# Patient Record
Sex: MALE | Race: WHITE | NOT HISPANIC OR LATINO | Employment: FULL TIME | URBAN - METROPOLITAN AREA
[De-identification: names, ages, dates, MRNs, and addresses within clinical notes are randomized per-mention and may not be internally consistent; named-entity substitution may affect disease eponyms.]

---

## 2018-11-21 ENCOUNTER — OFFICE VISIT (OUTPATIENT)
Dept: URGENT CARE | Facility: CLINIC | Age: 48
End: 2018-11-21

## 2018-11-21 ENCOUNTER — NON-PROVIDER VISIT (OUTPATIENT)
Dept: URGENT CARE | Facility: CLINIC | Age: 48
End: 2018-11-21

## 2018-11-21 DIAGNOSIS — Z01.89 RESPIRATORY CLEARANCE EXAMINATION, ENCOUNTER FOR: ICD-10-CM

## 2018-11-21 PROCEDURE — 94375 RESPIRATORY FLOW VOLUME LOOP: CPT | Performed by: PHYSICIAN ASSISTANT

## 2018-11-21 PROCEDURE — 99999 PR NO CHARGE: CPT | Performed by: PHYSICIAN ASSISTANT

## 2018-11-21 NOTE — PROGRESS NOTES
Jefferson Rooney is a 48 y.o. male here for a non-provider visit for mask fit respiratory clearance    If abnormal was an in office provider notified today (if so, indicate provider)? Yes  Routed to PCP? No

## 2019-08-07 ENCOUNTER — HOSPITAL ENCOUNTER (EMERGENCY)
Facility: MEDICAL CENTER | Age: 49
End: 2019-08-07
Attending: EMERGENCY MEDICINE
Payer: COMMERCIAL

## 2019-08-07 ENCOUNTER — APPOINTMENT (OUTPATIENT)
Dept: RADIOLOGY | Facility: MEDICAL CENTER | Age: 49
End: 2019-08-07
Attending: EMERGENCY MEDICINE
Payer: COMMERCIAL

## 2019-08-07 VITALS
TEMPERATURE: 96.8 F | HEART RATE: 72 BPM | SYSTOLIC BLOOD PRESSURE: 129 MMHG | DIASTOLIC BLOOD PRESSURE: 80 MMHG | HEIGHT: 71 IN | WEIGHT: 210.1 LBS | OXYGEN SATURATION: 97 % | BODY MASS INDEX: 29.41 KG/M2 | RESPIRATION RATE: 18 BRPM

## 2019-08-07 DIAGNOSIS — T14.8XXA SOFT TISSUE AVULSION: ICD-10-CM

## 2019-08-07 DIAGNOSIS — S68.119A TRAUMATIC AMPUTATION OF FINGERTIP, INITIAL ENCOUNTER: ICD-10-CM

## 2019-08-07 DIAGNOSIS — S62.639A CLOSED FRACTURE OF TUFT OF DISTAL PHALANX OF FINGER: ICD-10-CM

## 2019-08-07 PROCEDURE — 700111 HCHG RX REV CODE 636 W/ 250 OVERRIDE (IP): Performed by: EMERGENCY MEDICINE

## 2019-08-07 PROCEDURE — 90471 IMMUNIZATION ADMIN: CPT

## 2019-08-07 PROCEDURE — A9270 NON-COVERED ITEM OR SERVICE: HCPCS | Performed by: EMERGENCY MEDICINE

## 2019-08-07 PROCEDURE — 90715 TDAP VACCINE 7 YRS/> IM: CPT | Performed by: EMERGENCY MEDICINE

## 2019-08-07 PROCEDURE — 303485 HCHG DRESSING MEDIUM

## 2019-08-07 PROCEDURE — 97597 DBRDMT OPN WND 1ST 20 CM/<: CPT

## 2019-08-07 PROCEDURE — 700101 HCHG RX REV CODE 250: Performed by: EMERGENCY MEDICINE

## 2019-08-07 PROCEDURE — 700102 HCHG RX REV CODE 250 W/ 637 OVERRIDE(OP): Performed by: EMERGENCY MEDICINE

## 2019-08-07 PROCEDURE — 73140 X-RAY EXAM OF FINGER(S): CPT | Mod: LT

## 2019-08-07 PROCEDURE — 99284 EMERGENCY DEPT VISIT MOD MDM: CPT

## 2019-08-07 RX ORDER — CEPHALEXIN 500 MG/1
500 CAPSULE ORAL ONCE
Status: COMPLETED | OUTPATIENT
Start: 2019-08-07 | End: 2019-08-07

## 2019-08-07 RX ORDER — CEPHALEXIN 500 MG/1
500 CAPSULE ORAL 4 TIMES DAILY
Qty: 40 CAP | Refills: 0 | Status: SHIPPED | OUTPATIENT
Start: 2019-08-07 | End: 2019-08-17

## 2019-08-07 RX ORDER — LIDOCAINE HYDROCHLORIDE 10 MG/ML
20 INJECTION, SOLUTION INFILTRATION; PERINEURAL ONCE
Status: COMPLETED | OUTPATIENT
Start: 2019-08-07 | End: 2019-08-07

## 2019-08-07 RX ORDER — LISINOPRIL 5 MG/1
5 TABLET ORAL DAILY
Status: SHIPPED | COMMUNITY
End: 2023-02-24

## 2019-08-07 RX ADMIN — LIDOCAINE HYDROCHLORIDE 20 ML: 10 INJECTION, SOLUTION INFILTRATION; PERINEURAL at 16:00

## 2019-08-07 RX ADMIN — CEPHALEXIN 500 MG: 500 CAPSULE ORAL at 16:11

## 2019-08-07 RX ADMIN — CLOSTRIDIUM TETANI TOXOID ANTIGEN (FORMALDEHYDE INACTIVATED), CORYNEBACTERIUM DIPHTHERIAE TOXOID ANTIGEN (FORMALDEHYDE INACTIVATED), BORDETELLA PERTUSSIS TOXOID ANTIGEN (GLUTARALDEHYDE INACTIVATED), BORDETELLA PERTUSSIS FILAMENTOUS HEMAGGLUTININ ANTIGEN (FORMALDEHYDE INACTIVATED), BORDETELLA PERTUSSIS PERTACTIN ANTIGEN, AND BORDETELLA PERTUSSIS FIMBRIAE 2/3 ANTIGEN 0.5 ML: 5; 2; 2.5; 5; 3; 5 INJECTION, SUSPENSION INTRAMUSCULAR at 16:11

## 2019-08-07 NOTE — ED TRIAGE NOTES
DR GORE (WORKER'S COMP PHYSICIAN) IS REQUESTING TO BE CALLED BY ER PHYSICIAN CARING FOR THIS PATIENT TODAY. [phone]

## 2019-08-07 NOTE — LETTER
"  FORM C-4:  EMPLOYEE’S CLAIM FOR COMPENSATION/ REPORT OF INITIAL TREATMENT  EMPLOYEE’S CLAIM - PROVIDE ALL INFORMATION REQUESTED   First Name  Jefferson Last Name  Toribio Birthdate             Age  1970 49 y.o. 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 (if applicable)  Crating Modules    How did this injury or occupational disease occur? Be specific and answer in detail. Use additional sheet if necessary)  Putting modules into crate by carry/drop method and the module fell on my finger, crushing it between it and the edge of the crate.   If you believe that you have an occupational disease, when did you first have knowledge of the disability and it relationship to your employment?  n/a Witnesses to the Accident  Tobi Hansen     Nature of Injury or Occupational Disease  Workers' Compensation  Part(s) of Body Injured or Affected  Finger (L), N/A, N/A    I certify that the above is true and correct to the best of my knowledge and that I have provided this " information in order to obtain the benefits of [place] or [place]).  I hereby authorize any physician, chiropractor, surgeon, practitioner, or other person, any hospital, including [facility] or [facility], any medical service organization, any insurance company, or other institution or organization to release to each other, any medical or other information, including benefits paid or payable, pertinent to this injury or disease, except information relative to diagnosis, treatment and/or counseling for AIDS, psychological conditions, alcohol or controlled substances, for which I must give specific authorization.  A Photostat of this authorization shall be as valid as the original.   Date08/07/2019 Place  [facility] Employee’s Signature   THIS REPORT MUST BE COMPLETED AND MAILED WITHIN 3 WORKING DAYS OF TREATMENT   Place  [facility], EMERGENCY DEPT  Name of Facility   [facility]   Date  8/7/2019 Diagnosis  (T14.8XXA) Soft tissue avulsion  (S68.119A) Traumatic amputation of fingertip, initial encounter  (S62.639A) Closed fracture of tuft of distal phalanx of finger Is there evidence the injured employee was under the influence of alcohol and/or another controlled substance at the time of accident?   Hour  4:35 PM Description of Injury or Disease  Soft tissue avulsion  Traumatic amputation of fingertip, initial encounter  Closed fracture of tuft of distal phalanx of finger No   Treatment  Exam, wound care  Have you advised the patient to remain off work five days or more?         No   X-Ray Findings      If Yes   From Date    To Date      From information given by the employee, together with medical evidence, can you directly connect this injury or occupational disease as job incurred?  Yes If No, is the employee capable of: Full Duty  No Modified Duty  Yes   Is additional  "medical care by a physician indicated?  Yes If Modified Duty, Specify any Limitations / Restrictions  No using left hand     Do you know of any previous injury or disease contributing to this condition or occupational disease?  No   Date  8/7/2019 Print Doctor’s Name  Aidan Bae certify the employer’s copy of this form was mailed on:   Address  [address] [place] [id number] [phone] Insurer’s Use Only   [facility]  [address]    Provider’s Tax ID Number  [id number] Telephone  Dept: [phone]    Doctor’s Signature  e-AIDAN Velásquez M.D. Degree   M.D.    Original - TREATING PHYSICIAN OR CHIROPRACTOR   Pg 2-Insurer/TPA   Pg 3-Employer   Pg 4-Employee                                                                                                  Form C-4 (rev01/03)     BRIEF DESCRIPTION OF RIGHTS AND BENEFITS  (Pursuant to [place]RS 616C.050)  Notice of Injury or Occupational Disease (Incident Report Form C-1): If an injury or occupational disease (OD) arises out of and in the course of employment, you must provide written notice to your employer as soon as practicable, but no later than 7 days after the accident or OD. Your employer shall maintain a sufficient supply of the required forms.  Claim for Compensation (Form C-4): If medical treatment is sought, the form C-4 is available at the place of initial treatment. A completed \"Claim for Compensation\" (Form C-4) must be filed within 90 days after an accident or OD. The treating physician or chiropractor must, within 3 working days after treatment, complete and mail to the employer, the employer's insurer and third-party , the Claim for Compensation.  Medical Treatment: If you require medical treatment for your on-the-job injury or OD, you may be required to select a physician or chiropractor from a list provided by your workers’ compensation insurer, if it has contracted with an Organization for Managed Care (MCO) " or Preferred Provider Organization (PPO) or providers of health care. If your employer has not entered into a contract with an MCO or PPO, you may select a physician or chiropractor from the Panel of Physicians and Chiropractors. Any medical costs related to your industrial injury or OD will be paid by your insurer.  Temporary Total Disability (TTD): If your doctor has certified that you are unable to work for a period of at least 5 consecutive days, or 5 cumulative days in a 20-day period, or places restrictions on you that your employer does not accommodate, you may be entitled to TTD compensation.  Temporary Partial Disability (TPD): If the wage you receive upon reemployment is less than the compensation for TTD to which you are entitled, the insurer may be required to pay you TPD compensation to make up the difference. TPD can only be paid for a maximum of 24 months.  Permanent Partial Disability (PPD): When your medical condition is stable and there is an indication of a PPD as a result of your injury or OD, within 30 days, your insurer must arrange for an evaluation by a rating physician or chiropractor to determine the degree of your PPD. The amount of your PPD award depends on the date of injury, the results of the PPD evaluation and your age and wage.  Permanent Total Disability (PTD): If you are medically certified by a treating physician or chiropractor as permanently and totally disabled and have been granted a PTD status by your insurer, you are entitled to receive monthly benefits not to exceed 66 2/3% of your average monthly wage. The amount of your PTD payments is subject to reduction if you previously received a PPD award.  Vocational Rehabilitation Services: You may be eligible for vocational rehabilitation services if you are unable to return to the job due to a permanent physical impairment or permanent restrictions as a result of your injury or occupational disease.  Transportation and Per Rayne  Reimbursement: You may be eligible for travel expenses and per lorenza associated with medical treatment.  Reopening: You may be able to reopen your claim if your condition worsens after claim closure.  Appeal Process: If you disagree with a written determination issued by the insurer or the insurer does not respond to your request, you may appeal to the Department of Administration, , by following the instructions contained in your determination letter. You must appeal the determination within 70 days from the date of the determination letter at [address], [address], [place], [place] [address], or [address], [place], [place] [address]. If you disagree with the  decision, you may appeal to the Department of Administration, . You must file your appeal within 30 days from the date of the  decision letter at [address], [address], [place], [place] [address], or [address], [address], [place], [place] [address]. If you disagree with a decision of an , you may file a petition for judicial review with the District Court. You must do so within 30 days of the Appeal Officer’s decision. You may be represented by an  at your own expense or you may contact the [facility] for possible representation.  [place]  for Injured Workers (NAIW): If you disagree with a  decision, you may request that NAIW represent you without charge at an  Hearing. For information regarding denial of benefits, you may contact the [facility] at: [address], [address], [place] [address], [phone], or [address], [facility] [address], [place] [address], [phone]  To File a Complaint with the Division: If you wish to file a complaint with the  of the Division of Industrial Relations (DIR), please contact the Workers’ Compensation Section, [address],  [address], [place], [place] [address], telephone (337) [phone], or [address], [address], [place], [place] [address], telephone [phone].  For assistance with Workers’ Compensation Issues: you may contact the Office of the Governor Consumer Health Assistance, [address] [place] [address], [address], [place], [place] [address], Toll Free 1-879.767.1051, Web site: http://govcha.[facility].nv., E-mail [email or web address].nv.                                                                                                                                                                               __________________________________________________________________                                    ____08/07/2019_____________            Employee Name / Signature                                                                                                                            Date                                       D-2 (rev. 10/07)

## 2019-08-07 NOTE — ED TRIAGE NOTES
"Chief Complaint   Patient presents with   • Digit Pain     LT hand, 3rd digit was wedged between 2 objects and pulled. majority of tip of finger lacerated.      Pt to triage for above. Dressing removed, finger assessed. New dressing applied.     Pt returned to lobby. Educated on triage process and to inform staff of any changes.     /100   Pulse 68   Temp 36 °C (96.8 °F) (Temporal)   Resp 15   Ht 1.803 m (5' 11\")   Wt 95.3 kg (210 lb 1.6 oz)   SpO2 96%   BMI 29.30 kg/m²     "

## 2019-08-07 NOTE — ED PROVIDER NOTES
"ED Provider Note    Scribed for Aidan Bae M.D. by Parvez Purcell. 8/7/2019, 3:29 PM.    Primary care provider: Murtaza Sánchez M.D.  Means of arrival: walk in  History obtained from: Patient  History limited by: None    CHIEF COMPLAINT  Chief Complaint   Patient presents with   • Digit Pain     LT hand, 3rd digit was wedged between 2 objects and pulled. majority of tip of finger lacerated.        HPI  Jefferson Rooney is a 49 y.o. male who presents to the Emergency Department with numbness to his left middle finger onset today. The patient notes that the finger was crushed while moving a 300-pound weight at work. He states the area is numb and that he feels minimal pain. The patient is unsure if his tetanus vaccine is up to date.    REVIEW OF SYSTEMS  Pertinent positives include left middle finger numbness.   Pertinent negatives include no fever.       PAST MEDICAL HISTORY   has a past medical history of Hypertension.    SURGICAL HISTORY  patient denies any surgical history    SOCIAL HISTORY  Social History     Tobacco Use   • Smoking status: Never Smoker   • Smokeless tobacco: Never Used   Substance Use Topics   • Alcohol use: Yes   • Drug use: Never      Social History     Substance and Sexual Activity   Drug Use Never       FAMILY HISTORY  History reviewed. No pertinent family history.    CURRENT MEDICATIONS  Home Medications     Reviewed by Abdirahman Mccloud R.N. (Registered Nurse) on 08/07/19 at 1441  Med List Status: Partial   Medication Last Dose Status   lisinopril (PRINIVIL) 5 MG Tab 8/7/2019 Active                ALLERGIES  No Known Allergies    PHYSICAL EXAM  VITAL SIGNS: /100   Pulse 68   Temp 36 °C (96.8 °F) (Temporal)   Resp 15   Ht 1.803 m (5' 11\")   Wt 95.3 kg (210 lb 1.6 oz)   SpO2 96%   BMI 29.30 kg/m²     Nursing note and vitals reviewed.  Constitutional: Well-developed and well-nourished. No distress.   HENT: Head is normocephalic and atraumatic. Oropharynx is clear and moist without " exudate or erythema.   Eyes: Pupils are equal, round. Conjunctiva are normal.   Cardiovascular: Strong radial pulse. Capillary refill is less than 2 seconds distal to the point of injury.  Pulmonary/Chest: No respiratory distress. Chest wall is atraumatic.   Abdominal: Soft and non-tender. No distention. Atraumatic.    Musculoskeletal: Soft tissue avulsion of volar surface distal most portion of the left long finger. The nail is not involved. Extremities exhibit normal range of motion. Bilateral lower extremities are atraumatic.  Neurological: Awake, alert and oriented to person, place, and time. No focal deficits noted. Strength and sensation are normal distal to the point of injury.  Skin: Skin is warm and dry. No rash.   Psychiatric: Appropriate for clinical situation.      DIAGNOSTIC STUDIES / PROCEDURES  Laceration Repair Procedure Note    Indication: Partial amputation/soft tissue avulsion    Procedure: The patient was placed in the appropriate position and anesthesia around the laceration was obtained by infiltration using 1% Lidocaine without epinephrine. The area was then cleansed using Chloraprep. The laceration was debrided using scissors. There were no additional lacerations requiring repair. The wound area was then dressed with gauze and vaseline soaked gauze.      Total repaired wound length: n/a.     Other Items: None    The patient tolerated the procedure well.    Complications: None          RADIOLOGY  DX-FINGER(S) 2+ LEFT   Final Result      1.  Comminuted displaced acute fracture of the tuft of the distal phalanx of the third digit is identified.      2.  Significant soft tissue injury is also present.        The radiologist's interpretation of all radiological studies have been reviewed by me.    COURSE & MEDICAL DECISION MAKING  Nursing notes, VS, PMSFHx reviewed in chart.        3:29 PM - Patient seen and examined at bedside. I informed the patient of the plan for imaging to evaluate his finger  for fractures, as well as the plan to debride the avulsed tissue. Patient verbalizes understanding and agreement to this plan of care. Ordered DX-finger left to evaluate his symptoms.     DISPOSITION:  Patient will be discharged home in stable condition.    FOLLOW UP:  [facility], Emergency Dept  [address]  [facility] [id number] [phone]    If symptoms worsen    [place] [id number] [facility]  [address]  [facility] [id number] [phone]  Schedule an appointment as soon as possible for a visit         OUTPATIENT MEDICATIONS:  New Prescriptions    CEPHALEXIN (KEFLEX) 500 MG CAP    Take 1 Cap by mouth 4 times a day for 10 days.       The patient was discharged home with an information sheet on his fingertip injury and told to return immediately for any signs or symptoms listed.  The patient agreed to the discharge precautions and follow-up plan which is documented in EPIC.    FINAL IMPRESSION  1. Soft tissue avulsion    2. Traumatic amputation of fingertip, initial encounter    3. Closed fracture of tuft of distal phalanx of finger          Parvez MAI (Lennie), am scribing for, and in the presence of, Aidan Bae M.D..    Electronically signed by: Parvez Purcell (Lennie), 8/7/2019    Aidan MAI M.D. personally performed the services described in this documentation, as scribed by Parvez Purcell in my presence, and it is both accurate and complete. E    The note accurately reflects work and decisions made by me.  Aidan Bae  8/7/2019  4:33 PM

## 2019-08-07 NOTE — LETTER
"  FORM C-4:  EMPLOYEE’S CLAIM FOR COMPENSATION/ REPORT OF INITIAL TREATMENT  EMPLOYEE’S CLAIM - PROVIDE ALL INFORMATION REQUESTED   First Name  Jefferson Last Name  Toribio Birthdate             Age  1970 49 y.o. 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 (if applicable)  Crating Modules    How did this injury or occupational disease occur? Be specific and answer in detail. Use additional sheet if necessary)  Putting modules into crate by carry/drop method and the module fell on my finger, crushing it between it and the edge of the crate.   If you believe that you have an occupational disease, when did you first have knowledge of the disability and it relationship to your employment?  n/a Witnesses to the Accident  Tobi Hansen     Nature of Injury or Occupational Disease  Workers' Compensation  Part(s) of Body Injured or Affected  Finger (L), N/A, N/A    I certify that the above is true and correct to the best of my knowledge and that I have provided this " information in order to obtain the benefits of [place]  I hereby authorize any physician, chiropractor, surgeon, practitioner, or other person, any hospital, including [facility] or [facility], any medical service organization, any insurance company, or other institution or organization to release to each other, any medical or other information, including benefits paid or payable, pertinent to this injury or disease, except information relative to diagnosis, treatment and/or counseling for AIDS, psychological conditions, alcohol or controlled substances, for which I must give specific authorization.  A Photostat of this authorization shall be as valid as the original.   Date 08/07/2019 [facility] Employee’s Signature   THIS REPORT MUST BE COMPLETED AND MAILED WITHIN 3 WORKING DAYS OF TREATMENT   Place  [facility], EMERGENCY DEPT  Name of Facility   [facility]   Date  8/7/2019 Diagnosis  (T14.8XXA) Soft tissue avulsion  (S68.119A) Traumatic amputation of fingertip, initial encounter Is there evidence the injured employee was under the influence of alcohol and/or another controlled substance at the time of accident?   Hour  4:31 PM Description of Injury or Disease  Soft tissue avulsion  Traumatic amputation of fingertip, initial encounter No   Treatment  Exam, wound care  Have you advised the patient to remain off work five days or more?         No   X-Ray Findings      If Yes   From Date    To Date      From information given by the employee, together with medical evidence, can you directly connect this injury or occupational disease as job incurred?  Yes If No, is the employee capable of: Full Duty  No Modified Duty  Yes   Is additional medical care by a physician indicated?  Yes If Modified Duty, Specify any Limitations /  "Restrictions  No using left hand     Do you know of any previous injury or disease contributing to this condition or occupational disease?  No   Date  8/7/2019 Print Doctor’s Name  Aidan Bae certify the employer’s copy of this form was mailed on:   Address  [address] [address] [phone] Insurer’s Use Only   [facility] [place] [facility]  [address]    Provider’s Tax ID Number  [id number] Telephone  Dept: [phone]    Doctor’s Signature  e-AIDAN Velásquez M.D. Degree  M.D.    Original - TREATING PHYSICIAN OR CHIROPRACTOR   Pg 2-Insurer/TPA   Pg 3-Employer   Pg 4-Employee                                                                                                  Form C-4 (rev01/03)     BRIEF DESCRIPTION OF RIGHTS AND BENEFITS  (Pursuant to [place] 616C.050)    Notice of Injury or Occupational Disease (Incident Report Form C-1): If an injury or occupational disease (OD) arises out of and in the course of employment, you must provide written notice to your employer as soon as practicable, but no later than 7 days after the accident or OD. Your employer shall maintain a sufficient supply of the required forms.  Claim for Compensation (Form C-4): If medical treatment is sought, the form C-4 is available at the place of initial treatment. A completed \"Claim for Compensation\" (Form C-4) must be filed within 90 days after an accident or OD. The treating physician or chiropractor must, within 3 working days after treatment, complete and mail to the employer, the employer's insurer and third-party , the Claim for Compensation.  Medical Treatment: If you require medical treatment for your on-the-job injury or OD, you may be required to select a physician or chiropractor from a list provided by your workers’ compensation insurer, if it has contracted with an Organization for Managed Care (MCO) or Preferred Provider Organization (PPO) or providers of health care. If your employer " has not entered into a contract with an MCO or PPO, you may select a physician or chiropractor from the Panel of Physicians and Chiropractors. Any medical costs related to your industrial injury or OD will be paid by your insurer.  Temporary Total Disability (TTD): If your doctor has certified that you are unable to work for a period of at least 5 consecutive days, or 5 cumulative days in a 20-day period, or places restrictions on you that your employer does not accommodate, you may be entitled to TTD compensation.  Temporary Partial Disability (TPD): If the wage you receive upon reemployment is less than the compensation for TTD to which you are entitled, the insurer may be required to pay you TPD compensation to make up the difference. TPD can only be paid for a maximum of 24 months.  Permanent Partial Disability (PPD): When your medical condition is stable and there is an indication of a PPD as a result of your injury or OD, within 30 days, your insurer must arrange for an evaluation by a rating physician or chiropractor to determine the degree of your PPD. The amount of your PPD award depends on the date of injury, the results of the PPD evaluation and your age and wage.  Permanent Total Disability (PTD): If you are medically certified by a treating physician or chiropractor as permanently and totally disabled and have been granted a PTD status by your insurer, you are entitled to receive monthly benefits not to exceed 66 2/3% of your average monthly wage. The amount of your PTD payments is subject to reduction if you previously received a PPD award.  Vocational Rehabilitation Services: You may be eligible for vocational rehabilitation services if you are unable to return to the job due to a permanent physical impairment or permanent restrictions as a result of your injury or occupational disease.  Transportation and Per Lorenza Reimbursement: You may be eligible for travel expenses and per lorenza associated with  medical treatment.  Reopening: You may be able to reopen your claim if your condition worsens after claim closure.  Appeal Process: If you disagree with a written determination issued by the insurer or the insurer does not respond to your request, you may appeal to the Department of Administration, , by following the instructions contained in your determination letter. You must appeal the determination within 70 days from the date of the determination letter at [address], [address], [place], [place] [address], or [address], [address], [place], [place] [address]. If you disagree with the  decision, you may appeal to the Department of Administration, . You must file your appeal within 30 days from the date of the  decision letter at [address], [address], [place], [place] [address], or [address], [address], [place], [place] [address]. If you disagree with a decision of an , you may file a petition for judicial review with the District Court. You must do so within 30 days of the Appeal Officer’s decision. You may be represented by an  at your own expense or you may contact the [facility] for possible representation.  [place]  for Injured Workers (NAIW): If you disagree with a  decision, you may request that NAIW represent you without charge at an  Hearing. For information regarding denial of benefits, you may contact the [facility] at: [address], [address], [place] [address], [phone], or [address], [address], [place] [address], [phone]  To File a Complaint with the Division: If you wish to file a complaint with the  of the Division of Industrial Relations (DIR), please contact the Workers’ Compensation Section, [address], [address], [place], [place] [address], telephone [phone]) [phone], or [address]  [address], [address], [place], [place] [address], telephone [phone]) [phone].  For assistance with Workers’ Compensation Issues: you may contact the Office of the Governor Consumer Health Assistance, [address] [place] [address], [address], [place], [place] [address], Toll Free 1-159.533.2390, Web site: http://[employer].[facility].nv., E-mail [email or web address].[email or web address].                                                                                                                                                                               __________________________________________________________________                                    _________________            Employee Name / Signature                                                                                                                            Date                                       D-2 (rev. 10/07)

## 2019-08-08 ENCOUNTER — OCCUPATIONAL MEDICINE (OUTPATIENT)
Dept: URGENT CARE | Facility: PHYSICIAN GROUP | Age: 49
End: 2019-08-08
Payer: COMMERCIAL

## 2019-08-08 VITALS
RESPIRATION RATE: 16 BRPM | TEMPERATURE: 96.7 F | HEART RATE: 85 BPM | DIASTOLIC BLOOD PRESSURE: 78 MMHG | SYSTOLIC BLOOD PRESSURE: 128 MMHG | OXYGEN SATURATION: 99 %

## 2019-08-08 DIAGNOSIS — S62.639A CLOSED FRACTURE OF TUFT OF DISTAL PHALANX OF FINGER: ICD-10-CM

## 2019-08-08 DIAGNOSIS — T14.8XXA SOFT TISSUE AVULSION: ICD-10-CM

## 2019-08-08 PROCEDURE — 99203 OFFICE O/P NEW LOW 30 MIN: CPT | Mod: 29 | Performed by: PHYSICIAN ASSISTANT

## 2019-08-08 NOTE — LETTER
[address] [place] [address]  Phone:  [phone] - Fax:  [phone]   [facility] Progress Report and Disability Certification  Date of Service: 8/8/2019   No Show:  No  Date / Time of Next Visit: 8/15/2019   Claim Information   Patient Name: Jefferson Rooney  Claim Number:     Employer: [employer]  Date of Injury: 8/7/2019     Insurer / TPA: [employer]  ID / SSN:     Occupation: [employer] Technician  Diagnosis: Diagnoses of Soft tissue avulsion and Closed fracture of tuft of distal phalanx of finger were pertinent to this visit.    Medical Information   Related to Industrial Injury? Yes    Subjective Complaints:  DOI: 8/7/19, 2nd visit  Jefferson Rooney is a 49 y.o. male who presented to the Emergency Department on the DOI with numbness to his left middle finger. The patient notes that the finger was crushed while moving a 300-pound weight at work.  Patient's wound was dressed in the emergency department.  He does complain of some pain today.  He denies any drainage.  He has not picked up the antibiotics yet.   Objective Findings: Extremities: Soft tissue avulsion of volar surface distal most portion of the 3rd left finger. The nail is not involved. Extremities exhibit normal range of motion. Bilateral lower extremities are atraumatic.  Wound was cleansed and dressed in the clinic with Xeroform, gauze, and Coban.   Pre-Existing Condition(s):     Assessment:   Condition Same    Status: Additional Care Required  Permanent Disability:No    Plan: Medication  Comments:antibiotics    Diagnostics:      Comments:  Assessment/Plan:  Discussed wound care at home.  Return to clinic 8/15/2019 for reevaluation, sooner for any significant changes in symptoms.  Refer to D 39 for restrictions.  1. Soft tissue avulsion    2. Closed fracture of tuft of distal phalanx of   finger            Disability Information   Status: Released to Restricted Duty    From:   8/8/2019  Through: 8/15/2019 Restrictions are: Temporary   Physical Restrictions   Sitting:    Standing:    Stooping:    Bending:      Squatting:    Walking:    Climbing:    Pushing:      Pulling:    Other:    Reaching Above Shoulder (L):   Reaching Above Shoulder (R):       Reaching Below Shoulder (L):    Reaching Below Shoulder (R):      Not to exceed Weight Limits   Carrying(hrs):   Weight Limit(lb):   Lifting(hrs):   Weight  Limit(lb):     Comments: No use of left 3rd finger; must keep covered at work    Repetitive Actions   Hands: i.e. Fine Manipulations from Grasping:     Feet: i.e. Operating Foot Controls:     Driving / Operate Machinery:     Physician Name: Vira Doll P.A.-C. Physician Signature:   e-Signature: Dr. Ivan Sullivan, Medical Director   Clinic Name / Location: [address] [address] Clinic Phone Number: Dept: [phone]   Appointment Time: 12:30 Pm Visit Start Time: 12:45 PM   Check-In Time:  12:33 Pm Visit Discharge Time: 3:02 Pm    Original-Treating Physician or Chiropractor    Page 2-Insurer/TPA    Page 3-Employer    Page 4-Employee

## 2019-08-08 NOTE — LETTER
EMPLOYEE’S CLAIM FOR COMPENSATION/ REPORT OF INITIAL TREATMENT  FORM C-4    EMPLOYEE’S CLAIM - PROVIDE ALL INFORMATION REQUESTED   First Name  Jefferson Last Name  Toribio Birthdate                    1970                Sex  male Claim Number   Home Address  [address] Age  49 y.o. Height   Weight   SSN     [facility] Zip  [address] Telephone  [phone] (home)    Mailing Address  [address] 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 (if applicable)  Crating Modules    How did this injury or occupational disease occur? (Be specific an answer in detail. Use additional sheet if necessary)  Putting modules into crate by carry/drop method and the module fell on my finger, crushing it between it and the edge of the crate.   If you believe that you have an occupational disease, when did you first have knowledge of the disability and it relationship to your employment?  n/a Witnesses to the Accident  Tobi Tyrone      Nature of Injury or Occupational Disease  Workers' Compensation  Part(s) of Body Injured or Affected  Finger (L), N/A, N/A    I certify that the above is true and correct to the best of my knowledge and that I have provided this information in order to obtain the benefits  of [place]  I hereby authorize any physician, chiropractor, surgeon, practitioner, or other person, any hospital, including [facility] or [facility], any medical service organization, any insurance company, or other institution or organization to release to each other, any medical or other information, including benefits paid or payable, pertinent to this injury or disease, except information relative to diagnosis, treatment and/or counseling for AIDS, psychological conditions, alcohol or controlled substances, for which I must give specific authorization.  A Photostat of this authorization shall be as valid as the original.     Date   Place   Employee’s Signature   THIS REPORT MUST BE COMPLETED AND MAILED WITHIN 3 WORKING DAYS OF TREATMENT   Place  [facility]  Name of [facility]   Date  8/8/2019 Diagnosis  (T14.8XXA) Soft tissue avulsion  (S62.639A) Closed fracture of tuft of distal phalanx of finger Is there evidence the injured employee was under the influence of alcohol and/or another controlled substance at the time of accident?   Hour  12:45 PM Description of Injury or Disease  Diagnoses of Soft tissue avulsion and Closed fracture of tuft of distal phalanx of finger were pertinent to this visit. No   Treatment  Assessment/Plan:  Discussed wound care at home.  Return to clinic 8/15/2019 for reevaluation, sooner for any significant changes in symptoms.  Refer to D 39 for restrictions.  1. Soft tissue avulsion    2. Closed fracture of tuft of distal phalanx of finger        Have you advised the patient to remain off work five days or more? No   X-Ray Findings  Positive   If Yes   From Date  To Date      From information given by the employee, together with medical evidence, can you directly connect this injury or occupational disease as job incurred?  Yes If No  "Full Duty  No Modified Duty  Yes   Is additional medical care by a physician indicated?  Yes If Modified Duty, Specify any Limitations / Restrictions  See D39   Do you know of any previous injury or disease contributing to this condition or occupational disease?                            No   Date  8/16/2019 Print Doctor’s Name Vira Doll P.A.-C. I certify the employer’s copy of  this form was mailed on:   Address  [address] Insurer’s Use Only     [facility]  [address]    Provider’s Tax ID Number  [id number] Telephone  Dept: [phone]            e-Signature: Dr. Ivan Sullivan, Medical Director Degree  MD        ORIGINAL-TREATING PHYSICIAN OR CHIROPRACTOR    PAGE 2-INSURER/TPA    PAGE 3-EMPLOYER    PAGE 4-EMPLOYEE             Form C-4 (rev10/07)              BRIEF DESCRIPTION OF RIGHTS AND BENEFITS  (Pursuant to [place]RS 616C.050)    Notice of Injury or Occupational Disease (Incident Report Form C-1): If an injury or occupational disease (OD) arises out of and in the  course of employment, you must provide written notice to your employer as soon as practicable, but no later than 7 days after the accident or  OD. Your employer shall maintain a sufficient supply of the required forms.    Claim for Compensation (Form C-4): If medical treatment is sought, the form C-4 is available at the place of initial treatment. A completed  \"Claim for Compensation\" (Form C-4) must be filed within 90 days after an accident or OD. The treating physician or chiropractor must,  within 3 working days after treatment, complete and mail to the employer, the employer's insurer and third-party , the Claim for  Compensation.    Medical Treatment: If you require medical treatment for your on-the-job injury or OD, you may be required to select a physician or  chiropractor from a list provided by your workers’ compensation insurer, if it has contracted with an Organization for Managed Care (MCO) " or  Preferred Provider Organization (PPO) or providers of health care. If your employer has not entered into a contract with an MCO or PPO, you  may select a physician or chiropractor from the Panel of Physicians and Chiropractors. Any medical costs related to your industrial injury or  OD will be paid by your insurer.    Temporary Total Disability (TTD): If your doctor has certified that you are unable to work for a period of at least 5 consecutive days, or 5  cumulative days in a 20-day period, or places restrictions on you that your employer does not accommodate, you may be entitled to TTD  compensation.    Temporary Partial Disability (TPD): If the wage you receive upon reemployment is less than the compensation for TTD to which you are  entitled, the insurer may be required to pay you TPD compensation to make up the difference. TPD can only be paid for a maximum of 24  months.    Permanent Partial Disability (PPD): When your medical condition is stable and there is an indication of a PPD as a result of your injury or  OD, within 30 days, your insurer must arrange for an evaluation by a rating physician or chiropractor to determine the degree of your PPD. The  amount of your PPD award depends on the date of injury, the results of the PPD evaluation and your age and wage.    Permanent Total Disability (PTD): If you are medically certified by a treating physician or chiropractor as permanently and totally disabled  and have been granted a PTD status by your insurer, you are entitled to receive monthly benefits not to exceed 66 2/3% of your average  monthly wage. The amount of your PTD payments is subject to reduction if you previously received a PPD award.    Vocational Rehabilitation Services: You may be eligible for vocational rehabilitation services if you are unable to return to the job due to a  permanent physical impairment or permanent restrictions as a result of your injury or occupational  disease.    Transportation and Per Lorenza Reimbursement: You may be eligible for travel expenses and per lorenza associated with medical treatment.    Reopening: You may be able to reopen your claim if your condition worsens after claim closure.    Appeal Process: If you disagree with a written determination issued by the insurer or the insurer does not respond to your request, you may  appeal to the Department of Administration, , by following the instructions contained in your determination letter. You must  appeal the determination within 70 days from the date of the determination letter at [address], [address], [place], [place]  [address], or [address], [address], [place], [place] [address]. If you disagree with the  decision, you may appeal to the  Department of Administration, . You must file your appeal within 30 days from the date of the  decision  letter at [address], [address], [place], [place] [address], or [address], [place], [place] [address]. If you  disagree with a decision of an , you may file a petition for judicial review with the District Court. You must do so within 30  days of the Appeal Officer’s decision. You may be represented by an  at your own expense or you may contact the [facility] for possible  representation.    [place]  for Injured Workers (NAIW): If you disagree with a  decision, you may request that NAIW represent you  without charge at an  Hearing. For information regarding denial of benefits, you may contact the [facility] at: [address], [address], [place] [address], [phone], or [address] [facility] [address], [place] [address], [phone]    To File a Complaint with the Division: If you wish to file a complaint with the  of the Division of Industrial Relations (DIR),  please contact  the Workers’ Compensation Section, [address], [address], [place], [place] [address], telephone (315) [phone], or  [address], [address], [place], [place] [address], telephone [phone].    For assistance with Workers’ Compensation Issues: you may contact the Office of the [facility] Consumer Health Assistance, [address] [place] [address], [address], [place], [place] [address], Toll Free 1-128.908.8592, Web site: http://govcha.[facility].nv., E-mail  Jovita@[facility].nv.                                                                                                                                                                                                                                   __________________________________________________________________                                                                   _________________                Employee Name / Signature                                                                                                                                                       Date                                                                                                                                                                                                     D-2 (rev. 10/07)

## 2019-08-08 NOTE — LETTER
[facility] [address] [place] [address]  Phone:  [phone] - Fax:  [phone]   [facility] Progress Report and Disability Certification  Date of Service: 8/8/2019   No Show:  No  Date / Time of Next Visit: 8/15/2019   Claim Information   Patient Name: Jefferson Rooney  Claim Number:     Employer: [employer]  Date of Injury: 8/7/2019     Insurer / TPA: [employer]  ID / SSN:     Occupation: [employer] Technician  Diagnosis: Diagnoses of Soft tissue avulsion, Traumatic amputation of fingertip, subsequent encounter, and Closed fracture of tuft of distal phalanx of finger were pertinent to this visit.    Medical Information   Related to Industrial Injury? Yes    Subjective Complaints:  DOI: 8/7/19, 2nd visit  Jefferson Rooney is a 49 y.o. male who presented to the Emergency Department on the DOI with numbness to his left middle finger. The patient notes that the finger was crushed while moving a 300-pound weight at work.  Patient's wound was dressed in the emergency department.  He does complain of some pain today.  He denies any drainage.  He has not picked up the antibiotics yet.   Objective Findings: Extremities: Soft tissue avulsion of volar surface distal most portion of the 3rd left finger. The nail is not involved. Extremities exhibit normal range of motion. Bilateral lower extremities are atraumatic.  Wound was cleansed and dressed in the clinic with Xeroform, gauze, and Coban.   Pre-Existing Condition(s):     Assessment:   Condition Same    Status: Additional Care Required  Permanent Disability:No    Plan: Medication  Comments:antibiotics    Diagnostics:      Comments:  Assessment/Plan:  Discussed wound care at home.  Return to clinic 8/15/2019 for reevaluation, sooner for any significant changes in symptoms.  Refer to D 39 for restrictions.  1. Soft tissue avulsion    2. Traumatic amputation of fingertip, subsequen  t encounter    3. Closed  fracture of tuft of distal phalanx of finger          Disability Information   Status: Released to Restricted Duty    From:  8/8/2019  Through: 8/15/2019 Restrictions are: Temporary   Physical Restrictions   Sitting:    Standing:    Stooping:    Bending:      Squatting:    Walking:    Climbing:    Pushing:      Pulling:    Other:    Reaching Above Shoulder (L):   Reaching Above Shoulder (R):       Reaching Below Shoulder (L):    Reaching Below Shoulder (R):      Not to exceed Weight Limits   Carrying(hrs):   Weight Limit(lb):   Lifting(hrs):   Weight  Limit(lb):     Comments: No use of left 3rd finger; must keep covered at work    Repetitive Actions   Hands: i.e. Fine Manipulations from Grasping:     Feet: i.e. Operating Foot Controls:     Driving / Operate Machinery:     Physician Name: Vira Doll P.A.-C. Physician Signature:   e-Signature: Dr. Ivan Sullivan, Medical Director   Clinic Name / Location: [address] [address] Clinic Phone Number: Dept: [phone]   Appointment Time: 12:30 Pm Visit Start Time: 12:45 PM   Check-In Time:  12:33 Pm Visit Discharge Time:  2:00 PM   Original-Treating Physician or Chiropractor    Page 2-Insurer/TPA    Page 3-Employer    Page 4-Employee

## 2019-08-08 NOTE — LETTER
EMPLOYEE’S CLAIM FOR COMPENSATION/REPORT OF INITIAL TREATMENT   FORM C-4  PLEASE TYPE OR PRINT  EMPLOYEE’S CLAIM - PROVIDE ALL INFORMATION REQUESTED   First Name                   M.I.                                   Last Name  [place] Rooney Birthdate  1970      Sex  male Claim Number (Insurer’s Use Only)   Home Address  [address] Age  49 y.o. Height   Weight   Social Security Number     [facility]           [id number] Telephone  [phone] (home)    Mailing Address                                                                   [address]    Primary Language Spoken  English   INSURER   THIRD-PARTY    Employee’s Occupation (Job Title) When Injury or Occupational Disease Occurred:  [employer]   Employer’s Name/Company Name       Telephone     Office Mail Address (Number and Street)  [address] [place] [address]   Date of Injury (if applicable)  8/7/2019   Hours Injury (if applicable)  2:00 PM Date Employer Notified  8/7/2019 Last Day of Work After Injury  or Occupational Disease  8/7/2019 Supervisor to Whom Injury Reported  Nicolás Flynn   Address or Location of Accident (if applicable)  [[facility]]   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 (Be specific and answer in detail. Use additional sheet if necessary)  Putting modules into crate by carry/drop method and the module fell on my finger, crushing it between it and the edge of the crate.   If you believe that you have an occupational disease, when did you first have knowledge of the disability and its  relationship to your employment?  n/a Witnesses to the Accident (if  applicable)  Tobi Hansen   Nature of Injury or Occupational Disease  Workers' compensation  [56]        Part(s) of Body Injured or Affected  , N/a [586], N/a [586]    I CERTIFY THAT THE ABOVE IS TRUE AND CORRECT TO THE BEST OF MY KNOWLEDGE AND THAT I HAVE PROVIDED THIS INFORMATION IN ORDER TO OBTAIN THE BENEFITS OF [place]). I HEREBY AUTHORIZE ANY PHYSICIAN, CHIROPRACTOR, SURGEON, PRACTITIONER, OR OTHER PERSON, ANY HOSPITAL, INCLUDING [facility] OR [facility], ANY MEDICAL SERVICE ORGANIZATION, ANY INSURANCE COMPANY, OR OTHER INSTITUTION OR ORGANIZATION TO RELEASE TO EACH OTHER, ANY MEDICAL OR OTHER INFORMATION, INCLUDING BENEFITS PAID OR PAYABLE, PERTINENT TO THIS INJURY OR DISEASE, EXCEPT INFORMATION RELATIVE TO DIAGNOSIS, TREATMENT AND/OR COUNSELING FOR AIDS, PSYCHOLOGICAL CONDITIONS, ALCOHOL OR CONTROLLED SUBSTANCES, FOR WHICH I MUST GIVE SPECIFIC AUTHORIZATION.  A PHOTOSTAT OF THIS AUTHORIZATION SHALL BE AS VALID AS THE ORIGINAL.  Date 8/8/2019 Place [facility]  Employee’s Signature   THIS REPORT MUST BE COMPLETED AND MAILED WITHIN 3 WORKING DAYS OF TREATMENT   Place                                                                                                               Name of [facility]                                                                        [place]   Date  8/8/2019 Diagnosis and Description of Injury or Occupational Disease:   Is there evidence that the injured employee was under the influence of alcohol and/or another controlled substance at the time of the accident? (if yes, please explain)  No   Hour  12:45 PM     Treatment:  Assessment/Plan:  Discussed wound care at home.  Return to clinic 8/15/2019 for reevaluation, sooner for any significant changes in symptoms.  Refer to D 39 for restrictions.  1. Soft tissue avulsion    2. Traumatic amputation of fingertip, subsequent encounter    3. Closed fracture of tuft of distal  "phalanx of finger       Have you advised the patient to remain off work five days or more?     No Indicate Dates: from:   to:       If no, is the injured employee capable of:     Full Duty: No      Modified Duty:Yes    If modified duty, specify any limitations/restrictions: See D39   X-Ray Findings:  Positive    From information given by the employee, together with medical evidence, can you directly connect this injury or occupational disease as job incurred?   Yes     Is additional medical care by a physician indicated?   Yes     Do you know of any previous injury or disease contributing to this condition or occupational disease? (Explain if yes)  No   Date  8/8/2019 Print Doctor’s Name  Vira Doll P.A.-C. I certify that the employer’s copy of  this form was mailed to the employer on:     Address  [address] INSURER’S USE ONLY   [place] [address] Provider’s Tax I.D. Number  [id number]         Telephone  Dept: [phone]    Doctor’s   e-Signature                       ORIGINAL-TREATING PHYSICIAN OR CHIROPRACTOR   PAGE 2-INSURER/TPA   PAGE 3-EMPLOYER  PAGE 4-EMPLOYEE  Form C-4 (rev.10/07)        BRIEF DESCRIPTION OF RIGHTS AND BENEFITS  (Pursuant to [place])    Notice of Injury or Occupational Disease (Incident Report Form C-1): If an injury or occupational disease (OD) arises out of and in the  course of employment, you must provide written notice to your employer as soon as practicable, but no later than 7 days after the accident or  OD. Your employer shall maintain a sufficient supply of the required forms.    Claim for Compensation (Form C-4): If medical treatment is sought, the form C-4 is available at the place of initial treatment. A completed  \"Claim for Compensation\" (Form C-4) must be filed within 90 days after an accident or OD. The treating physician or chiropractor must,  within 3 working days after treatment, complete and mail to the " employer, the employer's insurer and third-party , the Claim for  Compensation.    Medical Treatment: If you require medical treatment for your on-the-job injury or OD, you may be required to select a physician or  chiropractor from a list provided by your workers’ compensation insurer, if it has contracted with an Organization for Managed Care (MCO) or  Preferred Provider Organization (PPO) or providers of health care. If your employer has not entered into a contract with an MCO or PPO, you  may select a physician or chiropractor from the Panel of Physicians and Chiropractors. Any medical costs related to your industrial injury or  OD will be paid by your insurer.    Temporary Total Disability (TTD): If your doctor has certified that you are unable to work for a period of at least 5 consecutive days, or 5  cumulative days in a 20-day period, or places restrictions on you that your employer does not accommodate, you may be entitled to TTD  compensation.    Temporary Partial Disability (TPD): If the wage you receive upon reemployment is less than the compensation for TTD to which you are  entitled, the insurer may be required to pay you TPD compensation to make up the difference. TPD can only be paid for a maximum of 24  months.    Permanent Partial Disability (PPD): When your medical condition is stable and there is an indication of a PPD as a result of your injury or  OD, within 30 days, your insurer must arrange for an evaluation by a rating physician or chiropractor to determine the degree of your PPD. The  amount of your PPD award depends on the date of injury, the results of the PPD evaluation and your age and wage.    Permanent Total Disability (PTD): If you are medically certified by a treating physician or chiropractor as permanently and totally disabled  and have been granted a PTD status by your insurer, you are entitled to receive monthly benefits not to exceed 66 2/3% of your  average  monthly wage. The amount of your PTD payments is subject to reduction if you previously received a PPD award.    Vocational Rehabilitation Services: You may be eligible for vocational rehabilitation services if you are unable to return to the job due to a  permanent physical impairment or permanent restrictions as a result of your injury or occupational disease.    Transportation and Per Lorenza Reimbursement: You may be eligible for travel expenses and per lorenza associated with medical treatment.    Reopening: You may be able to reopen your claim if your condition worsens after claim closure.    Appeal Process: If you disagree with a written determination issued by the insurer or the insurer does not respond to your request, you may  appeal to the Department of Administration, , by following the instructions contained in your determination letter. You must  appeal the determination within 70 days from the date of the determination letter at [address], [address], [place], [place]  [address], or [address], [address], [place] [address]. If you disagree with the  decision, you may appeal to the  Department of Administration, . You must file your appeal within 30 days from the date of the  decision  letter at [address], [address], [place], [place] [address], or [address], [address], [place] [address]. If you  disagree with a decision of an , you may file a petition for judicial review with the District Court. You must do so within 30  days of the Appeal Officer’s decision. You may be represented by an  at your own expense or you may contact the [facility] for possible  representation.    [place]  for Injured Workers (NAIW): If you disagree with a  decision, you may request that NAIW represent you  without charge at an  Hearing. For information  regarding denial of benefits, you may contact the [facility] at: [address], [address], [place] [address], [phone], or [address], [address], [place] [address], [phone] [phone]    To File a Complaint with the Division: If you wish to file a complaint with the  of the Division of Industrial Relations (DIR),  please contact the Workers’ Compensation Section, [address], [address], [place], [place] [address], telephone (597) [phone], or  [address], [address], [place], [place] [address], telephone [phone]) [phone].    For assistance with Workers’ Compensation Issues: you may contact the Office of the Governor Consumer Health Assistance, [address] [place] [address], [address], [place], [place] [address], Toll Free 1-423.579.4915, Web site: http://govcha.On license of [facility].nv., E-mail  [email or web address].On license of [facility].nv.                                                                                                                                                                                                                                   __________________________________________________________________                                                                   ______8/8/2019___________                Employee Name / Signature                                                                                                                                                       Date                                                                                                                                                                                                     D-2 (rev. 10/07)

## 2019-08-08 NOTE — PROGRESS NOTES
Chief Complaint   Patient presents with   • Follow-Up      fv-feeling overall better        HISTORY OF PRESENT ILLNESS: Patient is a 49 y.o. male who presents today for the following:    DOI: 8/7/19, 2nd visit  [place] Rooney is a 49 y.o. male who presented to the Emergency Department on the DOI with numbness to his left middle finger. The patient notes that the finger was crushed while moving a 300-pound weight at work.  Patient's wound was dressed in the emergency department.  He does complain of some pain today.  He denies any drainage.  He has not picked up the antibiotics yet.    Allergies:Patient has no known allergies.    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 No distress.  Pulmonary: Unlabored respiratory effort.     Extremities: Soft tissue avulsion of volar surface distal most portion of the 3rd left finger. The nail is not involved. Extremities exhibit normal range of motion. Bilateral lower extremities are atraumatic.  Wound was cleansed and dressed in the clinic with Xeroform, gauze, and Coban.  Neurologic: Grossly nonfocal. No facial asymmetry noted.  Skin: Warm, dry, good turgor. No rashes in visible areas.   Psych: Normal mood. Alert and oriented x3. Judgment and insight is normal.    Assessment/Plan:  Discussed wound care at home.  Return to clinic 8/15/2019 for reevaluation, sooner for any significant changes in symptoms.  Refer to D 39 for restrictions.  1. Soft tissue  avulsion     2. Closed fracture of tuft of distal phalanx of finger

## 2019-08-15 ENCOUNTER — OCCUPATIONAL MEDICINE (OUTPATIENT)
Dept: URGENT CARE | Facility: PHYSICIAN GROUP | Age: 49
End: 2019-08-15
Payer: COMMERCIAL

## 2019-08-15 VITALS
OXYGEN SATURATION: 98 % | WEIGHT: 206 LBS | HEART RATE: 76 BPM | RESPIRATION RATE: 16 BRPM | DIASTOLIC BLOOD PRESSURE: 70 MMHG | SYSTOLIC BLOOD PRESSURE: 126 MMHG | TEMPERATURE: 97 F | BODY MASS INDEX: 28.73 KG/M2

## 2019-08-15 DIAGNOSIS — S62.639A CLOSED FRACTURE OF TUFT OF DISTAL PHALANX OF FINGER: ICD-10-CM

## 2019-08-15 DIAGNOSIS — T14.8XXA SOFT TISSUE AVULSION: ICD-10-CM

## 2019-08-15 PROCEDURE — 99213 OFFICE O/P EST LOW 20 MIN: CPT | Mod: 29 | Performed by: PHYSICIAN ASSISTANT

## 2019-08-15 RX ORDER — POLYETHYLENE GLYCOL 3350, SODIUM SULFATE, SODIUM CHLORIDE, POTASSIUM CHLORIDE, ASCORBIC ACID, SODIUM ASCORBATE 7.5-2.691G
KIT ORAL
Refills: 0 | COMMUNITY
Start: 2019-07-01 | End: 2020-01-14

## 2019-08-15 RX ORDER — LISINOPRIL 10 MG/1
TABLET ORAL
Refills: 1 | COMMUNITY
Start: 2019-06-06 | End: 2023-02-24

## 2019-08-15 RX ORDER — SILDENAFIL 100 MG/1
TABLET, FILM COATED ORAL
Refills: 5 | COMMUNITY
Start: 2019-06-06 | End: 2023-07-19

## 2019-08-15 NOTE — LETTER
[address] [place] [address]  Phone:  [phone] - Fax:  [phone]   [facility] Progress Report and Disability Certification  Date of Service: 8/15/2019   No Show:  No  Date / Time of Next Visit:     Claim Information   Patient Name: [place] Rooney  Claim Number:     Employer: [employer]  Date of Injury: 8/7/2019     Insurer / TPA: [employer]  ID / SSN:     Occupation: Mailgun Technician  Diagnosis: Diagnoses of Soft tissue avulsion, Traumatic amputation of fingertip, subsequent encounter, and Closed fracture of tuft of distal phalanx of finger were pertinent to this visit.    Medical Information   Related to Industrial Injury? Yes    Subjective Complaints:  DOI: 8/7/19, 3rd visit   [place] Rooney is a 49 y.o. male who presented to the Emergency Department on the DOI with numbness to his left middle finger. The patient notes that the finger was crushed while moving a 300-pound weight at work. Patient reports improvement in his pain level.  He does continue to have pain if he has direct pressure over the wound.  He denies any drainage.  He has been keeping it covered at work.    Objective Findings: Extremities: Soft tissue avulsion of volar surface distal most portion of the 3rd left finger. The nail is not damaged but does show subungual hematoma at this time. Extremities exhibit normal range of motion.  Wound is without drainage or surrounding erythema.  Tenderness is noted only with direct pressure on the wound.  Wound was dressed in clinic.   Pre-Existing Condition(s):     Assessment:   Condition Improved    Status: Additional Care Required  Permanent Disability:No    Plan:      Diagnostics:      Comments:  Assessment/Plan:  Continue wound care at home.  Return to clinic in 3 weeks for reevaluation, sooner for any significant changes in symptoms.  Refer to D 39 for restrictions.  1. Soft tissue avulsion    2. Traumatic amputation  of fingertip, subsequen  t encounter    3. Closed fracture of tuft of distal phalanx of finger      Disability Information   Status: Released to Restricted Duty    From:  8/15/2019  Through:   Restrictions are:     Physical Restrictions   Sitting:    Standing:    Stooping:    Bending:      Squatting:    Walking:    Climbing:    Pushing:      Pulling:    Other:    Reaching Above Shoulder (L):   Reaching Above Shoulder (R):       Reaching Below Shoulder (L):    Reaching Below Shoulder (R):      Not to exceed Weight Limits   Carrying(hrs):   Weight Limit(lb):   Lifting(hrs):   Weight  Limit(lb):     Comments: No use of left 3rd finger; must keep covered at work.    Repetitive Actions   Hands: i.e. Fine Manipulations from Grasping:     Feet: i.e. Operating Foot Controls:     Driving / Operate Machinery:     Physician Name: Quyen Doll P.A.-C. Physician Signature: QUYEN Rangel P.A.-C. e-Signature: Dr. Ivan Sullivan, Medical Director   Clinic Name / Location: [address] [address] Clinic Phone Number: Dept: [phone]   Appointment Time: 9:40 Am Visit Start Time: 9:51 AM   Check-In Time:  9:35 Am Visit Discharge Time:  10:15 AM    Original-Treating Physician or Chiropractor    Page 2-Insurer/TPA    Page 3-Employer    Page 4-Employee

## 2019-08-15 NOTE — LETTER
[address] [place] [address]  Phone:  [phone] - Fax:  [phone]   [facility] Progress Report and Disability Certification  Date of Service: 8/15/2019   No Show:  No  Date / Time of Next Visit:     Claim Information   Patient Name: [place] Rooney  Claim Number:     Employer: [employer]  Date of Injury: 8/7/2019     Insurer / TPA: [employer]  ID / SSN:     Occupation: [employer] Technician  Diagnosis: Diagnoses of Soft tissue avulsion and Closed fracture of tuft of distal phalanx of finger were pertinent to this visit.    Medical Information   Related to Industrial Injury? Yes    Subjective Complaints:  DOI: 8/7/19, 3rd visit   [place] Rooney is a 49 y.o. male who presented to the Emergency Department on the DOI with numbness to his left middle finger. The patient notes that the finger was crushed while moving a 300-pound weight at work. Patient reports improvement in his pain level.  He does continue to have pain if he has direct pressure over the wound.  He denies any drainage.  He has been keeping it covered at work.    Objective Findings: Extremities: Soft tissue avulsion of volar surface distal most portion of the 3rd left finger. The nail is not damaged but does show subungual hematoma at this time. Extremities exhibit normal range of motion.  Wound is without drainage or surrounding erythema.  Tenderness is noted only with direct pressure on the wound.  Wound was dressed in clinic.   Pre-Existing Condition(s):     Assessment:   Condition Improved    Status: Additional Care Required  Permanent Disability:No    Plan:      Diagnostics:      Comments:  Assessment/Plan:  Continue wound care at home.  Return to clinic in 3 weeks for reevaluation, sooner for any significant changes in symptoms.  Refer to D 39 for restrictions.  1. Soft tissue avulsion    2. Closed fracture of tuft of distal phalanx of   finger        Disability  Information   Status: Released to Restricted Duty    From:  8/15/2019  Through:   Restrictions are:     Physical Restrictions   Sitting:    Standing:    Stooping:    Bending:      Squatting:    Walking:    Climbing:    Pushing:      Pulling:    Other:    Reaching Above Shoulder (L):   Reaching Above Shoulder (R):       Reaching Below Shoulder (L):    Reaching Below Shoulder (R):      Not to exceed Weight Limits   Carrying(hrs):   Weight Limit(lb):   Lifting(hrs):   Weight  Limit(lb):     Comments: No use of left 3rd finger; must keep covered at work.    Repetitive Actions   Hands: i.e. Fine Manipulations from Grasping:     Feet: i.e. Operating Foot Controls:     Driving / Operate Machinery:     Physician Name: Quyen Doll P.A.-C. Physician Signature: QUYEN Rangel P.A.-C. e-Signature: Dr. Ivan Sullivan, Medical Director   Clinic Name / Location: [address] [address] Clinic Phone Number: Dept: [phone]   Appointment Time: 9:40 Am Visit Start Time: 9:51 AM   Check-In Time:  9:35 Am Visit Discharge Time: 10:40 Am    Original-Treating Physician or Chiropractor    Page 2-Insurer/TPA    Page 3-Employer    Page 4-Employee

## 2019-08-15 NOTE — PROGRESS NOTES
Chief Complaint   Patient presents with   • Follow-Up      fv-feeling better        HISTORY OF PRESENT ILLNESS: Patient is a 49 y.o. male who presents today for the following:    DOI: 8/7/19, 3rd visit   [place] Rooney is a 49 y.o. male who presented to the Emergency Department on the DOI with numbness to his left middle finger. The patient notes that the finger was crushed while moving a 300-pound weight at work. Patient reports improvement in his pain level.  He does continue to have pain if he has direct pressure over the wound.  He denies any drainage.  He has been keeping it covered at work.     Allergies:Patient has no known allergies.    PMH: No pertinent past medical history to this problem  MEDS: Medications were reviewed in Epic  ALLERGIES: Allergies were reviewed in Epic  SOCHX: Works as a hazard waste technician at [employer]     FH: No pertinent family history to this problem    Review of Systems:   Constitutional ROS: No unexpected change in weight, No weakness, No fatigue   Extremities: left 3rd finger injury   Skin/Integumentary ROS: No edema, No evidence of rash, No itching    Exam:  /70   Pulse 76   Temp 36.1 °C (97 °F)   Resp 16   Wt 93.4 kg (206 lb)   SpO2 98%   General: Well developed, well nourished. No distress.  Pulmonary: Unlabored respiratory effort.   Extremities: Soft tissue avulsion of volar surface distal most portion of the 3rd left finger. The nail is not damaged but does show subungual hematoma at this time. Extremities exhibit normal range of motion.  Wound is without drainage or surrounding erythema.  Tenderness is noted only with direct pressure on the wound.  Wound was dressed in clinic.  Neurologic: Grossly nonfocal. No facial asymmetry noted.   Skin: Warm, dry, good turgor. No rashes in visible areas.   Psych: Normal mood. Alert and oriented x3. Judgment and insight is normal.      Assessment/Plan:  Continue wound care at home.  Return to clinic in 3 weeks for  reevaluation, sooner for any significant changes in symptoms.  Refer to D 39 for restrictions.  1. Soft tissue avulsion     2. Closed fracture of tuft of distal phalanx of finger

## 2019-09-04 ENCOUNTER — OCCUPATIONAL MEDICINE (OUTPATIENT)
Dept: URGENT CARE | Facility: CLINIC | Age: 49
End: 2019-09-04
Payer: COMMERCIAL

## 2019-09-04 VITALS
TEMPERATURE: 98.2 F | WEIGHT: 203.04 LBS | SYSTOLIC BLOOD PRESSURE: 104 MMHG | HEIGHT: 71 IN | HEART RATE: 74 BPM | OXYGEN SATURATION: 95 % | BODY MASS INDEX: 28.43 KG/M2 | DIASTOLIC BLOOD PRESSURE: 84 MMHG

## 2019-09-04 DIAGNOSIS — T14.8XXA SOFT TISSUE AVULSION: ICD-10-CM

## 2019-09-04 DIAGNOSIS — S62.639A CLOSED FRACTURE OF TUFT OF DISTAL PHALANX OF FINGER: ICD-10-CM

## 2019-09-04 PROCEDURE — 99213 OFFICE O/P EST LOW 20 MIN: CPT | Mod: 29 | Performed by: NURSE PRACTITIONER

## 2019-09-04 ASSESSMENT — ENCOUNTER SYMPTOMS
SENSORY CHANGE: 1
CHILLS: 0
FEVER: 0
MYALGIAS: 0
TINGLING: 1

## 2019-09-04 NOTE — LETTER
[facility]  [address] [facility], [address] [address]  Phone:  [phone] - Fax:  [phone]   [facility] Progress Report and Disability Certification  Date of Service: 9/4/2019   No Show:  No  Date / Time of Next Visit: 9/18/2019   Claim Information   Patient Name: Jefferson Rooney  Claim Number:     Employer: [employer]  Date of Injury: 8/7/2019     Insurer / TPA: [employer]  ID / SSN:     Occupation: Pay-Me Technician  Diagnosis: Diagnoses of Closed fracture of tuft of distal phalanx of finger and Soft tissue avulsion were pertinent to this visit.    Medical Information   Related to Industrial Injury? Yes    Subjective Complaints:  DOI: 8/719. Patient is 49 year old right hand dominant male here for follow up on injury of skin avulsion/amputation and fracture to distal end of left middle finger. He is currently doing well with pain on pressure as well lingering numbness. No medications or splinting at this time. On light duty.   Objective Findings: Physical Exam   Constitutional: He is oriented to person, place, and time. He appears well-developed and well-nourished. No distress.   Musculoskeletal: Normal range of motion.   Neurological: He is alert and oriented to person, place, and time.   Skin: Skin is warm and dry.   Scabbing noted. No s/s infection.   Psychiatric: He has a normal mood and affect. His behavior is normal. Thought content normal.   Nursing note and vitals reviewed.     Pre-Existing Condition(s):     Assessment:   Condition Improved    Status: Additional Care Required  Permanent Disability:No    Plan:      Diagnostics:      Comments:       Disability Information   Status: Released to Restricted Duty    From:  9/4/2019  Through: 9/18/2019 Restrictions are: Temporary   Physical Restrictions   Sitting:    Standing:    Stooping:    Bending:      Squatting:    Walking:    Climbing:    Pushing:      Pulling:    Other:    Reaching Above Shoulder  (L):   Reaching Above Shoulder (R):       Reaching Below Shoulder (L):    Reaching Below Shoulder (R):      Not to exceed Weight Limits   Carrying(hrs):   Weight Limit(lb):   Lifting(hrs):   Weight  Limit(lb):     Comments:      Repetitive Actions   Hands: i.e. Fine Manipulations from Grasping:   Comments:restriction of motion/work of left hand.   Feet: i.e. Operating Foot Controls:     Driving / Operate Machinery:     Physician Name: YAMILET Mcgrath Physician Signature: YESSI Sue e-Signature: Dr. Ivan uSllivan, Medical Director   Clinic Name / Location: [address], [address]  [place] [address] Clinic Phone Number: Dept: [phone]   Appointment Time: 10:15 Am Visit Start Time: 10:13 AM   Check-In Time:  10:04 Am Visit Discharge Time:  10:37 AM   Original-Treating Physician or Chiropractor    Page 2-Insurer/TPA    Page 3-Employer    Page 4-Employee

## 2019-09-04 NOTE — PROGRESS NOTES
Subjective:      Jefferson Rooney is a 49 y.o. male who presents with Follow-Up (Pt sts he is here for a FV on his left middle finger and sts it is healing well. Pt reports numbness and pain if bumped. )            [facility] DOI: 8/719. Patient is 49 year old right hand dominant male here for follow up on injury of skin avulsion/amputation and fracture to distal end of left middle finger. He is currently doing well with pain on pressure as well lingering numbness. No medications or splinting at this time. On light duty.  Patient has no known allergies.  Current Outpatient Medications on File Prior to Visit   Medication Sig Dispense Refill   • sildenafil citrate (VIAGRA) 100 MG tablet TK 1 T PO D  1 HOUR B SEXUAL ACTIVITY  5   • lisinopril (PRINIVIL) 5 MG Tab Take 5 mg by mouth every day.     • lisinopril (PRINIVIL) 10 MG Tab TK 1 T PO D  1   • MOVIPREP 100 g Recon Soln USE UTD  0     No current facility-administered medications on file prior to visit.      Social History     Socioeconomic History   • Marital status:      Spouse name: Not on file   • Number of children: Not on file   • Years of education: Not on file   • Highest education level: Not on file   Occupational History   • Not on file   Social Needs   • Financial resource strain: Not on file   • Food insecurity:     Worry: Not on file     Inability: Not on file   • Transportation needs:     Medical: Not on file     Non-medical: Not on file   Tobacco Use   • Smoking status: Never Smoker   • Smokeless tobacco: Never Used   Substance and Sexual Activity   • Alcohol use: Yes   • Drug use: Never   • Sexual activity: Not on file   Lifestyle   • Physical activity:     Days per week: Not on file     Minutes per session: Not on file   • Stress: Not on file   Relationships   • Social connections:     Talks on phone: Not on file     Gets together: Not on file     Attends Mandaen service: Not on file     Active member of club or organization: Not on file     Attends  "meetings of clubs or organizations: Not on file     Relationship status: Not on file   • Intimate partner violence:     Fear of current or ex partner: Not on file     Emotionally abused: Not on file     Physically abused: Not on file     Forced sexual activity: Not on file   Other Topics Concern   • Not on file   Social History Narrative   • Not on file     Breast Cancer-related family history is not on file.        Review of Systems   Constitutional: Negative for chills, fever and malaise/fatigue.   Musculoskeletal: Negative for myalgias.   Neurological: Positive for tingling and sensory change.          Objective:     /84 (BP Location: Right arm, Patient Position: Sitting, BP Cuff Size: Large adult)   Pulse 74   Temp 36.8 °C (98.2 °F) (Temporal)   Ht 1.791 m (5' 10.5\")   Wt 92.1 kg (203 lb 0.7 oz)   SpO2 95%   BMI 28.72 kg/m²      Physical Exam   Constitutional: He is oriented to person, place, and time. He appears well-developed and well-nourished. No distress.   Musculoskeletal: Normal range of motion.   Neurological: He is alert and oriented to person, place, and time.   Skin: Skin is warm and dry.   Scabbing noted. No s/s infection.   Psychiatric: He has a normal mood and affect. His behavior is normal. Thought content normal.   Nursing note and vitals reviewed.              Assessment/Plan:     1. Closed fracture of tuft of distal phalanx of finger     2. Soft tissue avulsion       Doing very well.  Restrictions same for light duty.  Follow up 2 weeks and I feel at that time we can release MMI.  "

## 2019-09-10 ENCOUNTER — OFFICE VISIT (OUTPATIENT)
Dept: URGENT CARE | Facility: CLINIC | Age: 49
End: 2019-09-10
Payer: COMMERCIAL

## 2019-09-10 VITALS
HEART RATE: 88 BPM | RESPIRATION RATE: 12 BRPM | BODY MASS INDEX: 29.2 KG/M2 | TEMPERATURE: 98.3 F | WEIGHT: 204 LBS | HEIGHT: 70 IN | SYSTOLIC BLOOD PRESSURE: 124 MMHG | DIASTOLIC BLOOD PRESSURE: 76 MMHG | OXYGEN SATURATION: 98 %

## 2019-09-10 DIAGNOSIS — Z02.89 ENCOUNTER FOR EXAMINATION REQUIRED BY DEPARTMENT OF TRANSPORTATION (DOT): ICD-10-CM

## 2019-09-10 PROCEDURE — 7100 PR DOT PHYSICAL: Performed by: NURSE PRACTITIONER

## 2019-09-10 ASSESSMENT — ENCOUNTER SYMPTOMS
FEVER: 0
ORTHOPNEA: 0
SPUTUM PRODUCTION: 0
HEADACHES: 0
PALPITATIONS: 0
WHEEZING: 0
DIAPHORESIS: 0
SEIZURES: 0
CHILLS: 0
DIZZINESS: 0
HEMOPTYSIS: 0
SHORTNESS OF BREATH: 0
COUGH: 0

## 2019-09-10 NOTE — PROGRESS NOTES
"Subjective:      Jefferson Rooney is a 49 y.o. male who presents with Employment Physical (DOT physical)            Patient comes in today for a DOT physical.  He has a current 1 year certificate. He reports he is in general good health.  He has hypertension that is well controlled on meds.  He denies any history of seizures.  Remote episode of syncope (vasovagal) but not in years.  Occasional cigar smoking and alcohol use.  No illicit drug use.  History of fracture of bones in right hand years ago with no chronic pain or baseline limitation.  Recent left middle fingertip avulsion and tuft fracture August 2019.  This wound is healing well without secondary complications.  He denies any weakness or limitation in  or hand movement.          Review of Systems   Constitutional: Negative for chills, diaphoresis, fever and malaise/fatigue.   Respiratory: Negative for cough, hemoptysis, sputum production, shortness of breath and wheezing.    Cardiovascular: Negative for chest pain, palpitations, orthopnea and leg swelling.   Neurological: Negative for dizziness, seizures and headaches.     Medications, Allergies, and current problem list reviewed today in Epic     Objective:     Blood Pressure 124/76 (BP Location: Left arm, Patient Position: Sitting, BP Cuff Size: Large adult)   Pulse 88   Temperature 36.8 °C (98.3 °F) (Temporal)   Respiration 12   Height 1.778 m (5' 10\")   Weight 92.5 kg (204 lb)   Oxygen Saturation 98%   Body Mass Index 29.27 kg/m²      Physical Exam   Constitutional: He is oriented to person, place, and time. He appears well-developed and well-nourished. No distress.   HENT:   Head: Normocephalic.   Right Ear: External ear normal.   Left Ear: External ear normal.   Nose: Nose normal.   Mouth/Throat: Oropharynx is clear and moist. No oropharyngeal exudate.   Eyes: Pupils are equal, round, and reactive to light. Conjunctivae and EOM are normal. Right eye exhibits no discharge. Left eye exhibits no " discharge. No scleral icterus.   Neck: Normal range of motion. Neck supple. No JVD present. No tracheal deviation present. No thyromegaly present.   Cardiovascular: Normal rate, regular rhythm and normal heart sounds. Exam reveals no gallop and no friction rub.   No murmur heard.  Pulmonary/Chest: Effort normal and breath sounds normal. No stridor. No respiratory distress. He has no wheezes. He has no rales. He exhibits no tenderness.   Abdominal: Soft. Bowel sounds are normal. He exhibits no distension and no mass. There is no tenderness. There is no rebound and no guarding. No hernia. Hernia confirmed negative in the right inguinal area and confirmed negative in the left inguinal area.   Genitourinary: Testes normal.   Genitourinary Comments: No inguinal hernia.   Musculoskeletal: Normal range of motion. He exhibits no edema or tenderness.   Lymphadenopathy:     He has no cervical adenopathy.   Neurological: He is alert and oriented to person, place, and time. No cranial nerve deficit. Coordination normal.   Skin: Skin is warm and dry. No rash noted. He is not diaphoretic. No erythema.   Psychiatric: He has a normal mood and affect. His behavior is normal. Judgment and thought content normal.   Vitals reviewed.  -see scanned forms            Assessment/Plan:     1. Encounter for examination required by Department of Transportation (DOT)       Discussed exam findings with patient.    1 year DOT certificate issued.  Follow up with PCP for routine health care, screening, and maintenance.  Patient verbalized understanding of and agreed with plan of care.

## 2019-09-18 ENCOUNTER — OCCUPATIONAL MEDICINE (OUTPATIENT)
Dept: URGENT CARE | Facility: CLINIC | Age: 49
End: 2019-09-18
Payer: COMMERCIAL

## 2019-09-18 VITALS
DIASTOLIC BLOOD PRESSURE: 81 MMHG | TEMPERATURE: 98.4 F | HEIGHT: 71 IN | BODY MASS INDEX: 29.01 KG/M2 | HEART RATE: 79 BPM | OXYGEN SATURATION: 98 % | WEIGHT: 207.23 LBS | RESPIRATION RATE: 14 BRPM | SYSTOLIC BLOOD PRESSURE: 123 MMHG

## 2019-09-18 DIAGNOSIS — S62.639A CLOSED FRACTURE OF TUFT OF DISTAL PHALANX OF FINGER: ICD-10-CM

## 2019-09-18 PROCEDURE — 99213 OFFICE O/P EST LOW 20 MIN: CPT | Mod: 29 | Performed by: PHYSICIAN ASSISTANT

## 2019-09-18 SDOH — HEALTH STABILITY: MENTAL HEALTH: HOW MANY STANDARD DRINKS CONTAINING ALCOHOL DO YOU HAVE ON A TYPICAL DAY?: 1 OR 2

## 2019-09-18 SDOH — HEALTH STABILITY: MENTAL HEALTH: HOW OFTEN DO YOU HAVE A DRINK CONTAINING ALCOHOL?: MONTHLY OR LESS

## 2019-09-18 SDOH — HEALTH STABILITY: MENTAL HEALTH: HOW OFTEN DO YOU HAVE 6 OR MORE DRINKS ON ONE OCCASION?: NEVER

## 2019-09-18 ASSESSMENT — PAIN SCALES - GENERAL: PAINLEVEL: 2=MINIMAL-SLIGHT

## 2019-09-18 NOTE — PROGRESS NOTES
"Subjective:      Jefferson Rooney is a 49 y.o. male who presents with Finger Injury (work comp follow up, distal 3rd finger fx.)      DOI: 8/7/19,   Jefferson Rooney is a 49 y.o. male who presented to the Emergency Department on the DOI with numbness to his left middle finger. The patient notes that the finger was crushed while moving a 300-pound weight at work.      Fourth visit to the urgent care today.  His finger has healed well he has full ability to flex and extend.  There is no break in the skin there is no open wound.  The nail is adhered and attached     HPI  See above  ROS  No numbness or tingling.  No weakness of the finger     Objective:     /81 (BP Location: Right arm, Patient Position: Sitting, BP Cuff Size: Adult)   Pulse 79   Temp 36.9 °C (98.4 °F) (Temporal)   Resp 14   Ht 1.803 m (5' 11\")   Wt 94 kg (207 lb 3.7 oz)   SpO2 98%   BMI 28.90 kg/m²      Physical Exam    Middle finger: Patient has subungual hematoma under the nail but the nail is adhered.  The distal tip on the volar aspect distal to the DIP is healed well.  There is no break in the skin he has full ability to flex and extend at all joints and normal strength against resistance  Neurovascular intact.  2+  strength normal sensation normal cap refill       Assessment/Plan:     1. Closed fracture of tuft of distal phalanx of finger  Patient's fourth visit under occupational health.  Discharge/MMI.    "

## 2019-09-18 NOTE — LETTER
[facility]  [address] [facility], [address] [address]  Phone:  [phone] - Fax:  [phone]   [facility] Progress Report and Disability Certification  Date of Service: 9/18/2019   No Show:  No  Date / Time of Next Visit:     Claim Information   Patient Name: Jefferson Rooney  Claim Number:     Employer: [employer]  Date of Injury: 8/7/2019     Insurer / TPA: [employer]  ID / SSN:     Occupation: [employer] Technician  Diagnosis: The encounter diagnosis was Closed fracture of tuft of distal phalanx of finger.    Medical Information   Related to Industrial Injury?      Subjective Complaints:  DOI: 8/7/19,   Jefferson Rooney is a 49 y.o. male who presented to the Emergency Department on the DOI with numbness to his left middle finger. The patient notes that the finger was crushed while moving a 300-pound weight at work.      Fourth visit to the urgent care today.  His finger has healed well he has full ability to flex and extend.  There is no break in the skin there is no open wound.  The nail is adhered and attached   Objective Findings: Middle finger: Patient has subungual hematoma under the nail but the nail is adhered.  The distal tip on the volar aspect distal to the DIP is healed well.  There is no break in the skin he has full ability to flex and extend at all joints and normal strength against resistance  Neurovascular intact.  2+  strength normal sensation normal cap refill   Pre-Existing Condition(s):     Assessment:   Condition Improved    Status: Discharged /  MMI  Permanent Disability:     Plan:      Diagnostics:      Comments:       Disability Information   Status:      From:     Through:   Restrictions are:     Physical Restrictions   Sitting:    Standing:    Stooping:    Bending:      Squatting:    Walking:    Climbing:    Pushing:      Pulling:    Other:    Reaching Above Shoulder (L):   Reaching Above Shoulder (R):       Reaching Below Shoulder  (L):    Reaching Below Shoulder (R):      Not to exceed Weight Limits   Carrying(hrs):   Weight Limit(lb):   Lifting(hrs):   Weight  Limit(lb):     Comments: Patient has good strength and range of motion of his finger.  No deficit noted.  No break in the skin.  Discharge/MMI today    Repetitive Actions   Hands: i.e. Fine Manipulations from Grasping:     Feet: i.e. Operating Foot Controls:     Driving / Operate Machinery:     Physician Name: Tarsha Monzon P.A.-C. Physician Signature: TARSHA Quigley P.A.-C. e-Signature: Dr. Ivan Sullivan, Medical Director   Clinic Name / Location: [address], [address]  [facility] [place] [address] Clinic Phone Number: Dept: [phone]   Appointment Time: 9:00 Am Visit Start Time: 9:46 AM   Check-In Time:  8:53 Am Visit Discharge Time:  10:43 AM   Original-Treating Physician or Chiropractor    Page 2-Insurer/TPA    Page 3-Employer    Page 4-Employee

## 2019-10-11 ENCOUNTER — OFFICE VISIT (OUTPATIENT)
Dept: URGENT CARE | Facility: CLINIC | Age: 49
End: 2019-10-11
Payer: COMMERCIAL

## 2019-10-11 DIAGNOSIS — Z01.89 RESPIRATORY CLEARANCE EXAMINATION, ENCOUNTER FOR: ICD-10-CM

## 2019-10-11 PROCEDURE — 94375 RESPIRATORY FLOW VOLUME LOOP: CPT | Performed by: NURSE PRACTITIONER

## 2019-10-11 PROCEDURE — 8916 PR CLEARANCE ONLY: Performed by: NURSE PRACTITIONER

## 2019-10-11 NOTE — PROGRESS NOTES
"Lungs clear to auscultation bilaterally. Heart sounds normal.    Refer to paper documentation scanned into electronic health record under \"Media.\"  "

## 2019-10-11 NOTE — NON-PROVIDER
Jefferson Rooney is a 49 y.o. male here for a Respiratory Clearance visit for Mask Fitting    If abnormal was an in office provider notified today (if so, indicate provider)? Yes  Routed to PCP? No

## 2020-01-14 ENCOUNTER — OFFICE VISIT (OUTPATIENT)
Dept: URGENT CARE | Facility: CLINIC | Age: 50
End: 2020-01-14
Payer: COMMERCIAL

## 2020-01-14 VITALS
WEIGHT: 212.96 LBS | SYSTOLIC BLOOD PRESSURE: 148 MMHG | BODY MASS INDEX: 29.81 KG/M2 | HEIGHT: 71 IN | TEMPERATURE: 98 F | OXYGEN SATURATION: 98 % | DIASTOLIC BLOOD PRESSURE: 100 MMHG | HEART RATE: 74 BPM | RESPIRATION RATE: 14 BRPM

## 2020-01-14 DIAGNOSIS — M79.661 RIGHT CALF PAIN: ICD-10-CM

## 2020-01-14 PROCEDURE — 99213 OFFICE O/P EST LOW 20 MIN: CPT | Performed by: NURSE PRACTITIONER

## 2020-01-15 NOTE — PROGRESS NOTES
Chief Complaint   Patient presents with   • Leg Pain     Pt states he was doing normal walking when he felt a sharp stabbing pain in his RT calf muscle.        HISTORY OF PRESENT ILLNESS: Patient is a 49 y.o. male who presents to urgent care today with complaints of right calf pain. Admits to sudden onset of right calf pain 2 hours ago. He was ambulating, on flat ground, and felt a sharp pain. Pain has improved somewhat but still is present. He is concerned about potential DVT today. He has family history of clotting disorder but none for him.     There are no active problems to display for this patient.      Allergies:Patient has no known allergies.    Current Outpatient Medications Ordered in Epic   Medication Sig Dispense Refill   • lisinopril (PRINIVIL) 10 MG Tab TK 1 T PO D  1   • sildenafil citrate (VIAGRA) 100 MG tablet TK 1 T PO D  1 HOUR B SEXUAL ACTIVITY  5   • lisinopril (PRINIVIL) 5 MG Tab Take 5 mg by mouth every day.       No current [facility]-ordered facility-administered medications on file.        Past Medical History:   Diagnosis Date   • Hypertension        Social History     Tobacco Use   • Smoking status: Light Tobacco Smoker     Types: Cigars   • Smokeless tobacco: Never Used   • Tobacco comment: cigar every few months   Substance Use Topics   • Alcohol use: Yes     Alcohol/week: 1.2 oz     Types: 2 Shots of liquor per week     Frequency: Monthly or less     Drinks per session: 1 or 2     Binge frequency: Never   • Drug use: Never       No family status information on file.   History reviewed. No pertinent family history.    ROS:  Review of Systems   Constitutional: Negative for fever, chills, weight loss, malaise, and fatigue.   HENT: Negative for ear pain, nosebleeds, congestion, sore throat and neck pain.    Eyes: Negative for vision changes.   Neuro: Negative for headache, sensory changes, weakness, seizure, LOC.   Cardiovascular: Negative for chest pain, palpitations, orthopnea and leg  "swelling.   Respiratory: Negative for cough, sputum production, shortness of breath and wheezing.   Gastrointestinal: Negative for abdominal pain, nausea, vomiting or diarrhea.   Genitourinary: Negative for dysuria, urgency and frequency.  Musculoskeletal: Positive for right calf pain.  Negative for falls, neck pain, back pain, joint pain, myalgias.   Skin: Negative for rash, diaphoresis.     Exam:  /100   Pulse 74   Temp 36.7 °C (98 °F) (Temporal)   Resp 14   Ht 1.803 m (5' 11\")   Wt 96.6 kg (212 lb 15.4 oz)   SpO2 98%   General: well-nourished, well-developed male in NAD  Head: normocephalic, atraumatic  Eyes: PERRLA, no conjunctival injection, acuity grossly intact, lids normal.  Ears: normal shape and symmetry, no tenderness, no discharge. External canals are without any significant edema or erythema. Tympanic membranes are without any inflammation, no effusion. Gross auditory acuity is intact.  Nose: symmetrical without tenderness, no discharge.  Mouth/Throat: reasonable hygiene, no erythema, exudates or tonsillar enlargement.  Neck: no masses, range of motion within normal limits, no tracheal deviation. No obvious thyroid enlargement.   Lymph: no cervical adenopathy. No supraclavicular adenopathy.   Neuro: alert and oriented. Cranial nerves 1-12 grossly intact. No sensory deficit.   Cardiovascular: regular rate and rhythm. No edema.  Pulmonary: no distress. Chest is symmetrical with respiration, no wheezes, crackles, or rhonchi.   Musculoskeletal: no clubbing, appropriate muscle tone, gait is stable.  Right calf: Normal in appearance, no obvious swelling or erythema, there is tenderness to mid medial aspect of calf.  No significant tenderness over Achilles, negative Mallory's test.  Skin: warm, dry, intact, no clubbing, no cyanosis, no rashes.   Psych: appropriate mood, affect, judgement.         Assessment/Plan:  1. Right calf pain           Upon examination and mechanism of injury, I suspect " musculoskeletal etiology such as muscular strain or tendon injury.  Nevertheless cannot exclude DVT and patient is requesting an ultrasound this evening.  Unfortunately we do not have ultrasound for the remainder of the evening in urgent care, therefore he is encouraged to go to the emergency room tonight for further evaluation and care.  He is in agreement.  He is in no acute distress upon departure.          Please note that this dictation was created using voice recognition software. I have made every reasonable attempt to correct obvious errors, but I expect that there are errors of grammar and possibly content that I did not discover before finalizing the note.      ABHISHEK Celaya.

## 2020-03-06 ENCOUNTER — NON-PROVIDER VISIT (OUTPATIENT)
Dept: URGENT CARE | Facility: CLINIC | Age: 50
End: 2020-03-06

## 2020-03-06 DIAGNOSIS — Z02.1 PRE-EMPLOYMENT DRUG SCREENING: ICD-10-CM

## 2020-03-06 PROCEDURE — 7503 PR ESCREEN ACCT UDS COL ONLY: Performed by: PHYSICIAN ASSISTANT

## 2023-02-24 ENCOUNTER — OFFICE VISIT (OUTPATIENT)
Dept: URGENT CARE | Facility: PHYSICIAN GROUP | Age: 53
End: 2023-02-24
Payer: COMMERCIAL

## 2023-02-24 VITALS
SYSTOLIC BLOOD PRESSURE: 118 MMHG | HEART RATE: 76 BPM | DIASTOLIC BLOOD PRESSURE: 78 MMHG | TEMPERATURE: 97.7 F | HEIGHT: 71 IN | OXYGEN SATURATION: 96 % | BODY MASS INDEX: 30.24 KG/M2 | WEIGHT: 216 LBS | RESPIRATION RATE: 14 BRPM

## 2023-02-24 DIAGNOSIS — R42 DIZZY SPELLS: ICD-10-CM

## 2023-02-24 PROCEDURE — 99214 OFFICE O/P EST MOD 30 MIN: CPT | Performed by: NURSE PRACTITIONER

## 2023-02-24 RX ORDER — ATORVASTATIN CALCIUM 20 MG/1
20 TABLET, FILM COATED ORAL DAILY
COMMUNITY
Start: 2023-02-21

## 2023-02-24 RX ORDER — LISINOPRIL 20 MG/1
20 TABLET ORAL DAILY
COMMUNITY
Start: 2023-02-20

## 2023-02-24 ASSESSMENT — ENCOUNTER SYMPTOMS
DIZZINESS: 0
NAUSEA: 0
WHEEZING: 0
HEADACHES: 0
VOMITING: 0
SPUTUM PRODUCTION: 0
COUGH: 0
DIARRHEA: 0
EYE DISCHARGE: 0
EYE PAIN: 0
SORE THROAT: 0
PALPITATIONS: 0
SINUS PAIN: 0
SHORTNESS OF BREATH: 0
CHILLS: 0
FEVER: 0
EYE REDNESS: 0

## 2023-02-24 NOTE — PROGRESS NOTES
Patient has consented to treatment and for use of patient information for treatment and billing purposes.    Chief Complaint:    Chief Complaint   Patient presents with    Dizziness     X1 day Pt had dizzy spell at work after a clean up, pt has been off soda for  a few days, doesn't know if that is why the dizziness happened, has not had any more spells since, needs work note to return to work         History of Present Illness: 52 y.o.  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  He reports that he went to stand up and became very dizzy and lightheaded.  He was seen by the medics at [facility] who recommended that he go to work.  He denies any symptoms since that one episode.  He is requesting a work note.    Of note, patient also reports that 3 days ago he stopped drinking Coca[employer] and has concerns that he may be going through some caffeine and sugar withdrawal.    Review of Systems   Constitutional:  Negative for chills, fever and malaise/fatigue.   HENT:  Negative for congestion, ear pain, sinus pain, sore throat and tinnitus.    Eyes:  Negative for pain, discharge and redness.   Respiratory:  Negative for cough, sputum production, shortness of breath and wheezing.    Cardiovascular:  Negative for chest pain and palpitations.   Gastrointestinal:  Negative for diarrhea, nausea and vomiting.   Neurological:  Negative for dizziness and headaches.     Medications, Allergies, and current problem list reviewed today in Epic.    Physical Exam:    Vitals:    02/24/23 1058   BP: 118/78   Pulse: 76   Resp: 14   Temp: 36.5 °C (97.7 °F)   SpO2: 96%             Physical Exam  Constitutional:       General: He is not in acute distress.     Appearance: He is not ill-appearing, toxic-appearing or diaphoretic.   HENT:      Nose: Nose normal.      Mouth/Throat:      Mouth: Mucous membranes are moist.   Eyes:       Extraocular Movements: Extraocular movements intact.      Pupils: Pupils are equal, round, and reactive to light.   Cardiovascular:      Rate and Rhythm: Normal rate and regular rhythm.      Heart sounds: Murmur heard.   Pulmonary:      Effort: Pulmonary effort is normal. No respiratory distress.      Breath sounds: Normal breath sounds. No stridor. No wheezing, rhonchi or rales.   Musculoskeletal:         General: Normal range of motion.   Skin:     General: Skin is warm and dry.   Neurological:      General: No focal deficit present.      Mental Status: He is alert and oriented to person, place, and time.      Cranial Nerves: No cranial nerve deficit.   Psychiatric:         Mood and Affect: Mood normal.         Behavior: Behavior normal.         Thought Content: Thought content normal.         Judgment: Judgment normal.        Medical Decision Making:  I personally reviewed prior external notes and test results pertinent to today's visit.   Shared decision-making was utilized with patient did develop treatment plan and clinic course. Semaj, 52 y.o. 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  He reports that he went to stand up and became very dizzy and lightheaded.  He was seen by the medics at [facility] who recommended that he go to work.  He denies any symptoms since that one episode.  He is requesting a work note.    Physical exam finding overall was within normal limits.  Patient was found to have heart murmur.  He reports that he has had a heart murmur since childhood and has had an echo completed which showed a bicuspid aortic valve.  He does have an appointment scheduled with his primary care provider on March 7.  Recommended patient keep that appointment and follow-up with possible echo for recent dizzy episode at work and heart murmur.  Blood pressure today is within normal range  however patient is on lisinopril 20 mg tablet daily.  Stressed the importance with patient to have slow position changes while at work.  I do feel that he is able to go back to work without restrictions.  Work note was provided.      The patient remained stable during the urgent care visit.    Plan:  -Continue on lisinopril 20 mg tablet daily.  -Drink at least 64 to 80 ounces of water per day.  -Slow position changes when going from a kneeling, crouch, or sitting position.  -Follow-up with primary care provider at scheduled appointment on March 7.  Medication discussed included indication for use and the potential  benefits and side effects.         1. Dizzy spells    Other orders  - lisinopril (PRINIVIL) 20 MG Tab  - atorvastatin (LIPITOR) 20 MG Tab          Verbal and/or printed education was provided regarding the assessment and diagnosis.  All of the patient's questions were answered to their satisfaction at the time of discharge.    Follow up:    Recommended f/u in  7-10 days if there is no improvement.    Patient was encouraged to monitor symptoms closely. Those signs and symptoms which would warrant concern and mandate seeking a higher level of service through the emergency department discussed at length and included in discharge papers.  Patient stated agreement and understanding of this plan of care.    Disposition:  Home in stable condition       Voice Recognition Disclaimer:  Portions of this document were created using voice recognition software. The software does have a chance of producing errors of grammar and possibly content. I have made every reasonable attempt to correct obvious errors, but there may be errors of grammar and possibly content that I did not discover before finalizing the documentation.

## 2023-02-24 NOTE — LETTER
[facility] [address] [place]  [facility] [address]     February 24, 2023    Patient: Jefferson Rooney   YOB: 1970   Date of Visit: 2/24/2023       To Whom It May Concern:    Jefferson Rooney was seen and treated in our department on 2/24/2023.  Patient may return to work on 2- without restrictions.    Sincerely,     Alexandria Birch, ENA.[email or web address].

## 2023-06-26 ENCOUNTER — TELEPHONE (OUTPATIENT)
Dept: CARDIOLOGY | Facility: MEDICAL CENTER | Age: 53
End: 2023-06-26
Payer: COMMERCIAL

## 2023-06-26 NOTE — LETTER
June 27, 2023       Patient: Jefferson Rooney   YOB: 1970         Diagnostic Echocardiogram with Findings consistent with  Severe Aortic Stenosis    Dear Dr. Steven Jones,    [facility] is dedicated to providing comprehensive care to all of its patients. We recognize that ensuring excellent communication with our patients’ providers during and after care at our facility is a key component in achieving the highest level of care for each patient.    Our [facility] has implemented a quality initiative aimed at identifying patients with valvular heart disease and confirming a clinical plan for their care upon diagnosis.     As part of this initiative, the ordering physician receives a letter accompanying the patient's echocardiogram report.     At [facility], we are committed to establishing collaborative relationships with the local physician community to ensure that patients receive the highest quality care.     Should you have any questions regarding this information or referral process:    Please contact:  [facility] Nurse Coordinator directly at [phone]    Respectfully,    Signature    JAIDA Zambrano, RN  [facility] Nurse Coordinator

## 2023-06-26 NOTE — TELEPHONE ENCOUNTER
Please let the patients PMD, Dr Steven Jones,  know the patients echo shows severe aortic stenosis - thanks

## 2023-06-27 NOTE — TELEPHONE ENCOUNTER
Echo report scanned into media.    Surveillance letter faxed to ordering provider Dr. Steven Jones [phone]

## 2023-06-30 ENCOUNTER — TELEPHONE (OUTPATIENT)
Dept: CARDIOLOGY | Facility: MEDICAL CENTER | Age: 53
End: 2023-06-30
Payer: COMMERCIAL

## 2023-06-30 DIAGNOSIS — I35.0 SEVERE AORTIC STENOSIS: ICD-10-CM

## 2023-06-30 DIAGNOSIS — Z01.810 PRE-PROCEDURAL CARDIOVASCULAR EXAMINATION: ICD-10-CM

## 2023-06-30 DIAGNOSIS — I35.0 NONRHEUMATIC AORTIC (VALVE) STENOSIS: ICD-10-CM

## 2023-06-30 NOTE — TELEPHONE ENCOUNTER
----- Message from Winifred Matos R.N. sent at 6/30/2023  2:48 PM PDT -----  Regarding: Pre TAVR Cath  Hey! Can you please hold pre TAVR cath 7/24-7/25? Thanks!

## 2023-06-30 NOTE — TELEPHONE ENCOUNTER
Referral from: Dr. Steven Jones for Severe AS    Patient called on 06/30/2023.    Patient states he has been experiencing symptoms which prompted Dr. Jones to order echo. Per patient, he has been more fatigued and recently had a fainting spell.    Discussed with patient consultation appointments, testing needed, and plan of care.    Patient given dates and times of testing and consultations.    All questions answered.    Phone number given to patient for [facility] for any further questions or concerns.    Lab order placed in mail to patient and emailed to [email or web address].

## 2023-06-30 NOTE — TELEPHONE ENCOUNTER
Consult on 7-19-23 with Dr. Hancock at 10:20. Pre TAVR angio spot saved on 7-25-23 at 11:30 with . Paper work put into basket in RN office.

## 2023-07-11 ENCOUNTER — HOSPITAL ENCOUNTER (OUTPATIENT)
Dept: LAB | Facility: MEDICAL CENTER | Age: 53
End: 2023-07-11
Attending: INTERNAL MEDICINE
Payer: COMMERCIAL

## 2023-07-11 DIAGNOSIS — Z01.810 PRE-PROCEDURAL CARDIOVASCULAR EXAMINATION: ICD-10-CM

## 2023-07-11 DIAGNOSIS — I35.0 NONRHEUMATIC AORTIC (VALVE) STENOSIS: ICD-10-CM

## 2023-07-11 DIAGNOSIS — I35.0 SEVERE AORTIC STENOSIS: ICD-10-CM

## 2023-07-11 LAB
ALBUMIN SERPL BCP-MCNC: 4.8 G/DL (ref 3.2–4.9)
ALBUMIN/GLOB SERPL: 1.8 G/DL
ALP SERPL-CCNC: 121 U/L (ref 30–99)
ALT SERPL-CCNC: 36 U/L (ref 2–50)
ANION GAP SERPL CALC-SCNC: 10 MMOL/L (ref 7–16)
AST SERPL-CCNC: 18 U/L (ref 12–45)
BILIRUB SERPL-MCNC: 0.7 MG/DL (ref 0.1–1.5)
BUN SERPL-MCNC: 7 MG/DL (ref 8–22)
CALCIUM ALBUM COR SERPL-MCNC: 8.8 MG/DL (ref 8.5–10.5)
CALCIUM SERPL-MCNC: 9.4 MG/DL (ref 8.5–10.5)
CHLORIDE SERPL-SCNC: 103 MMOL/L (ref 96–112)
CO2 SERPL-SCNC: 28 MMOL/L (ref 20–33)
CREAT SERPL-MCNC: 0.79 MG/DL (ref 0.5–1.4)
GFR SERPLBLD CREATININE-BSD FMLA CKD-EPI: 105 ML/MIN/1.73 M 2
GLOBULIN SER CALC-MCNC: 2.6 G/DL (ref 1.9–3.5)
GLUCOSE SERPL-MCNC: 93 MG/DL (ref 65–99)
POTASSIUM SERPL-SCNC: 4.1 MMOL/L (ref 3.6–5.5)
PROT SERPL-MCNC: 7.4 G/DL (ref 6–8.2)
SODIUM SERPL-SCNC: 141 MMOL/L (ref 135–145)

## 2023-07-11 PROCEDURE — 80053 COMPREHEN METABOLIC PANEL: CPT

## 2023-07-11 PROCEDURE — 36415 COLL VENOUS BLD VENIPUNCTURE: CPT

## 2023-07-11 NOTE — PROGRESS NOTES
REFERRING PHYSICIAN: Dale Hancock MD.     CONSULTING PHYSICIAN: Chris George DO     CHIEF COMPLAINT: Dizziness    HISTORY OF PRESENT ILLNESS: The patient is a 53 y.o. male with past medical history of hypertension who presents to clinic with progressive dizziness and fatigue for the past three years. He also reports decreased exercise tolerance and syncope. He denies chest pain, orthopnea, PND, or lower extremity edema.  Patient does have a history of occasional cigar smoking.  He does not inhale    PAST MEDICAL HISTORY:   Active Ambulatory Problems     Diagnosis Date Noted    Severe aortic stenosis 07/19/2023    Primary hypertension 07/19/2023    HLD (hyperlipidemia) 07/19/2023    Impaired fasting glucose 07/19/2023     Resolved Ambulatory Problems     Diagnosis Date Noted    No Resolved Ambulatory Problems     Past Medical History:   Diagnosis Date    Hypertension        PAST SURGICAL HISTORY: History reviewed. No pertinent surgical history.     ALLERGIES: No Known Allergies     CURRENT MEDICATIONS:   Current Outpatient Medications:     lisinopril (PRINIVIL) 20 MG Tab, , Disp: , Rfl:     atorvastatin (LIPITOR) 20 MG Tab, , Disp: , Rfl:     FAMILY HISTORY: History reviewed. No pertinent family history.     SOCIAL HISTORY:   Social History     Socioeconomic History    Marital status:      Spouse name: Not on file    Number of children: Not on file    Years of education: Not on file    Highest education level: Not on file   Occupational History    Not on file   Tobacco Use    Smoking status: Light Smoker     Types: Cigars     Passive exposure: Never    Smokeless tobacco: Never    Tobacco comments:     cigar every few months   Vaping Use    Vaping Use: Never used   Substance and Sexual Activity    Alcohol use: Yes     Alcohol/week: 1.2 oz     Types: 2 Shots of liquor per week    Drug use: Never    Sexual activity: Yes     Partners: Female     Birth control/protection: None   Other Topics  "Concern    Not on file   Social History Narrative    Not on file     Social Determinants of Health     Financial Resource Strain: Not on file   Food Insecurity: Not on file   Transportation Needs: Not on file   Physical Activity: Not on file   Stress: Not on file   Social Connections: Not on file   Intimate Partner Violence: Not on file   Housing Stability: Not on file       REVIEW OF SYSTEMS:  Review of Systems   Constitutional:  Positive for malaise/fatigue. Negative for chills, diaphoresis, fever and weight loss.   HENT:  Negative for congestion, ear pain, hearing loss, nosebleeds, sore throat and tinnitus.    Eyes:  Negative for blurred vision, double vision, pain and discharge.   Respiratory:  Positive for shortness of breath. Negative for cough, hemoptysis, sputum production, wheezing and stridor.    Cardiovascular:  Negative for chest pain, palpitations, orthopnea and leg swelling.   Gastrointestinal:  Negative for abdominal pain, constipation, heartburn, melena, nausea and vomiting.   Genitourinary:  Negative for dysuria, flank pain and hematuria.   Musculoskeletal:  Negative for joint pain, myalgias and neck pain.   Skin:  Negative for itching and rash.   Neurological:  Positive for dizziness and loss of consciousness. Negative for speech change, focal weakness, seizures, weakness and headaches.   Endo/Heme/Allergies:  Negative for environmental allergies and polydipsia. Does not bruise/bleed easily.   Psychiatric/Behavioral:  Negative for depression, hallucinations, substance abuse and suicidal ideas.          PHYSICAL EXAMINATION:    /76 (BP Location: Left arm, Patient Position: Sitting, BP Cuff Size: Adult)   Pulse 69   Temp 36.6 °C (97.8 °F) (Temporal)   Ht 1.803 m (5' 11\")   Wt 98 kg (216 lb)   SpO2 98%   BMI 30.13 kg/m²    Physical Exam  Constitutional:       General: He is not in acute distress.  HENT:      Head: Normocephalic and atraumatic.      Nose: Nose normal.   Eyes:      " Conjunctiva/sclera: Conjunctivae normal.      Pupils: Pupils are equal, round, and reactive to light.   Neck:      Vascular: No JVD.      Trachea: No tracheal deviation.   Cardiovascular:      Rate and Rhythm: Normal rate and regular rhythm.      Heart sounds: Murmur heard.   Pulmonary:      Effort: Pulmonary effort is normal. No respiratory distress.      Breath sounds: Normal breath sounds. No stridor.   Abdominal:      General: Bowel sounds are normal. There is no distension.      Palpations: Abdomen is soft.      Tenderness: There is no abdominal tenderness.   Musculoskeletal:         General: No tenderness. Normal range of motion.      Cervical back: Normal range of motion and neck supple.   Skin:     General: Skin is warm and dry.   Neurological:      Mental Status: He is alert and oriented to person, place, and time.      Coordination: Coordination normal.      Gait: Gait is intact.   Psychiatric:         Mood and Affect: Mood and affect normal.         Cognition and Memory: Memory normal.           LABS REVIEWED:  Lab Results   Component Value Date/Time    SODIUM 141 07/11/2023 12:50 PM    POTASSIUM 4.1 07/11/2023 12:50 PM    CHLORIDE 103 07/11/2023 12:50 PM    CO2 28 07/11/2023 12:50 PM    GLUCOSE 93 07/11/2023 12:50 PM    BUN 7 (L) 07/11/2023 12:50 PM    CREATININE 0.79 07/11/2023 12:50 PM      No results found for: PROTHROMBTM, INR   Lab Results   Component Value Date/Time    WBC 6.3 04/29/2005 09:25 AM    RBC 4.84 04/29/2005 09:25 AM    HEMOGLOBIN 15.6 04/29/2005 09:25 AM    HEMATOCRIT 40.5 (L) 04/29/2005 09:25 AM    MCV 83.7 04/29/2005 09:25 AM    MCH 32.2 04/29/2005 09:25 AM    MCHC 37.4 (H) 04/29/2005 09:25 AM    MPV 6.6 (L) 04/29/2005 09:25 AM        IMAGING REVIEWED AND INTERPRETED:    ECHOCARDIOGRAM   6/26/23 [facility]  Conclusions:  Left ventricular systolic function is normal with visually estimated ejection fraction of 55-60%  The left ventricular diastolic function is normal  Right ventricular  systolic function is normal  Severe aortic stenosis- PEak velocity 5.4 m/s, mean gradient of 72 mmHg, and aortic valve area of 0.6cm2.  There is mild mitral valve regurgitation    CARDIAC CATHETERIZATION pending scheduled 7/25    CT SCAN CHEST   7/19/23 [facility]  IMPRESSION:     1.  Calcified tricuspid valve area is 630 mm?     2.  Coronary arteries originate greater than 1.5 cm above the annulus     3.  Satisfactory iliofemoral access with at least 7 mm diameter and only minimal plaque     4.  Possible chronic urinary bladder outlet obstruction given some mild bladder wall thickening. This also may just indicate incomplete distention      IMPRESSION:  Severe symptomatic aortic stenosis, hypertension      PLAN:    I recommend surgical valve replacement.  The patient would prefer tissue valve, as he struggles with medical compliance with his medications.  His echo is not available to me to review, we will get a copy of that disc prior to surgery.  He is also undergoing cath in the next several days, and those images will be reviewed.    The procedure, its risks, benefits, potential complications and alternative treatments were discussed with the patient in detail including the risks should he decide not to undergo my recommended treatment. All of his questions were answered to his satisfaction and he is willing to proceed with the operation. The risks include death, stroke, infection: to include a rare bacterial infection related to the use of the heart/lung machine, reema-operative myocardial infarction, dysrhythmias, diaphragmatic paralysis, chest wall paresthesia, tracheostomy, kidney or other organ failure, possible return to the operating room for bleeding, bleeding requiring transfusion with its attendant risks including AIDS or hepatitis, dehiscence of surgical incisions, respiratory complications including the need for prolonged ventilator support, Protamine or other drug reaction, peripheral neuropathy, loss of  limb, and miscount of surgical items. The operative mortality risk is approximately 1%. The STS mortality risk score is 0.6% and the morbidity and mortality risk score is 5.4%. The scores were discussed with patient.    Findings and recommendations have been discussed with the patient’s cardiologist, Dale Hancock MD.  Thank you for this very challenging consultation and participation in the patient’s care.  I will keep you apprised of all future developments.      The operation is scheduled for August 4 at 730 AM at [facility].         Sincerely,       Chris George DO.

## 2023-07-12 ENCOUNTER — TELEPHONE (OUTPATIENT)
Dept: CARDIOLOGY | Facility: MEDICAL CENTER | Age: 53
End: 2023-07-12
Payer: COMMERCIAL

## 2023-07-19 ENCOUNTER — DOCUMENTATION (OUTPATIENT)
Dept: CARDIOLOGY | Facility: MEDICAL CENTER | Age: 53
End: 2023-07-19

## 2023-07-19 ENCOUNTER — TELEPHONE (OUTPATIENT)
Dept: CARDIOLOGY | Facility: MEDICAL CENTER | Age: 53
End: 2023-07-19

## 2023-07-19 ENCOUNTER — OFFICE VISIT (OUTPATIENT)
Dept: CARDIOTHORACIC SURGERY | Facility: MEDICAL CENTER | Age: 53
End: 2023-07-19
Payer: COMMERCIAL

## 2023-07-19 ENCOUNTER — OFFICE VISIT (OUTPATIENT)
Dept: CARDIOLOGY | Facility: MEDICAL CENTER | Age: 53
End: 2023-07-19
Attending: INTERNAL MEDICINE
Payer: COMMERCIAL

## 2023-07-19 ENCOUNTER — HOSPITAL ENCOUNTER (OUTPATIENT)
Dept: RADIOLOGY | Facility: MEDICAL CENTER | Age: 53
End: 2023-07-19
Payer: COMMERCIAL

## 2023-07-19 VITALS
HEART RATE: 69 BPM | HEIGHT: 71 IN | SYSTOLIC BLOOD PRESSURE: 116 MMHG | DIASTOLIC BLOOD PRESSURE: 76 MMHG | OXYGEN SATURATION: 98 % | TEMPERATURE: 97.8 F | WEIGHT: 216 LBS | BODY MASS INDEX: 30.24 KG/M2

## 2023-07-19 VITALS
OXYGEN SATURATION: 97 % | HEART RATE: 73 BPM | RESPIRATION RATE: 14 BRPM | DIASTOLIC BLOOD PRESSURE: 72 MMHG | HEIGHT: 71 IN | SYSTOLIC BLOOD PRESSURE: 114 MMHG | WEIGHT: 216 LBS | BODY MASS INDEX: 30.24 KG/M2

## 2023-07-19 DIAGNOSIS — Z01.810 PRE-PROCEDURAL CARDIOVASCULAR EXAMINATION: ICD-10-CM

## 2023-07-19 DIAGNOSIS — I35.0 SEVERE AORTIC STENOSIS: ICD-10-CM

## 2023-07-19 DIAGNOSIS — I35.0 NONRHEUMATIC AORTIC (VALVE) STENOSIS: ICD-10-CM

## 2023-07-19 PROBLEM — I10 PRIMARY HYPERTENSION: Status: ACTIVE | Noted: 2023-07-19

## 2023-07-19 PROBLEM — R73.01 IMPAIRED FASTING GLUCOSE: Status: ACTIVE | Noted: 2023-07-19

## 2023-07-19 PROBLEM — E78.5 HLD (HYPERLIPIDEMIA): Status: ACTIVE | Noted: 2023-07-19

## 2023-07-19 LAB — EKG IMPRESSION: NORMAL

## 2023-07-19 PROCEDURE — 3074F SYST BP LT 130 MM HG: CPT | Performed by: THORACIC SURGERY (CARDIOTHORACIC VASCULAR SURGERY)

## 2023-07-19 PROCEDURE — 3078F DIAST BP <80 MM HG: CPT | Performed by: THORACIC SURGERY (CARDIOTHORACIC VASCULAR SURGERY)

## 2023-07-19 PROCEDURE — 3074F SYST BP LT 130 MM HG: CPT | Performed by: INTERNAL MEDICINE

## 2023-07-19 PROCEDURE — 700117 HCHG RX CONTRAST REV CODE 255

## 2023-07-19 PROCEDURE — 93005 ELECTROCARDIOGRAM TRACING: CPT | Performed by: INTERNAL MEDICINE

## 2023-07-19 PROCEDURE — 71275 CT ANGIOGRAPHY CHEST: CPT

## 2023-07-19 PROCEDURE — 93010 ELECTROCARDIOGRAM REPORT: CPT | Performed by: INTERNAL MEDICINE

## 2023-07-19 PROCEDURE — 3078F DIAST BP <80 MM HG: CPT | Performed by: INTERNAL MEDICINE

## 2023-07-19 PROCEDURE — 99213 OFFICE O/P EST LOW 20 MIN: CPT | Performed by: INTERNAL MEDICINE

## 2023-07-19 PROCEDURE — 99205 OFFICE O/P NEW HI 60 MIN: CPT | Performed by: THORACIC SURGERY (CARDIOTHORACIC VASCULAR SURGERY)

## 2023-07-19 PROCEDURE — 99205 OFFICE O/P NEW HI 60 MIN: CPT | Performed by: INTERNAL MEDICINE

## 2023-07-19 RX ADMIN — IOHEXOL 100 ML: 350 INJECTION, SOLUTION INTRAVENOUS at 08:44

## 2023-07-19 ASSESSMENT — ENCOUNTER SYMPTOMS
DEPRESSION: 0
HEADACHES: 0
DIZZINESS: 1
COUGH: 0
BLURRED VISION: 0
VOMITING: 0
HEARTBURN: 0
HALLUCINATIONS: 0
STRIDOR: 0
NAUSEA: 0
SHORTNESS OF BREATH: 1
MYALGIAS: 0
DOUBLE VISION: 0
EYE DISCHARGE: 0
LOSS OF CONSCIOUSNESS: 1
HEMOPTYSIS: 0
ORTHOPNEA: 0
POLYDIPSIA: 0
EYE PAIN: 0
FLANK PAIN: 0
SPUTUM PRODUCTION: 0
WHEEZING: 0
SPEECH CHANGE: 0
SEIZURES: 0
NECK PAIN: 0
WEAKNESS: 0
PALPITATIONS: 0
BRUISES/BLEEDS EASILY: 0
FEVER: 0
SORE THROAT: 0
ABDOMINAL PAIN: 0
CONSTIPATION: 0
DIAPHORESIS: 0
CHILLS: 0
WEIGHT LOSS: 0
FOCAL WEAKNESS: 0

## 2023-07-19 ASSESSMENT — PATIENT HEALTH QUESTIONNAIRE - PHQ9
CLINICAL INTERPRETATION OF PHQ2 SCORE: 1
5. POOR APPETITE OR OVEREATING: 1 - SEVERAL DAYS
SUM OF ALL RESPONSES TO PHQ QUESTIONS 1-9: 6

## 2023-07-19 ASSESSMENT — LIFESTYLE VARIABLES: SUBSTANCE_ABUSE: 0

## 2023-07-19 NOTE — PROGRESS NOTES
Problem: Prehabilitation    Goal: optimize identified modifiable risk factors prior to cardiac surgery.     Intervention: Screening and interventions for the following  risk factors with educational materials provided if indicated  and patient demonstrates readiness to participate.  Dentition, malnutrition, CAD and dietary cholesterol,  obesity, alcohol and tobacco abuse, illegal drug use, and   social support system for post discharge planning.    Additionally, home exercise regimen initiation or continuation  (if appropriate), and teaching of and participation of  inspiratory muscle training via incentive spirometer (provided).    Review of post-surgical physical limitations, upcoming  appointments & testing, ordered diagnostics, and medication review  to identify and educate on anticoagulant and antihypertensives  that need cessation in the days prior to surgery.    The cardiac surgery prehabilitation sheet, surgery instruction sheet,  MAP, education booklet, vitals logbook, normals after heart surgery,  and cardiac rehab flier was provided for patient to read and review.    Surgical CHG wipes were also provided with instructions on use.    DENTITION:  Routine dental appointment prior to surgery is   indicated for valve procedure.    he was encouraged to get a dental   cleaning as he  does not get regular  dental cleaning but current dental   infection or issues that should be  addressed prior to surgery. He thinks he has a plain cavity but is unsure.    INCENTIVE SPIROMETRY:  Discussed importance of incentive spirometry (IS) use,  20 times a day AT MINIMUM but more often if possible to  optimize cardio-pulmonary function prior to surgery.  They were instructed to allow a 5 minute rest in  between uses to achieve max IS volume.  Education with return demonstration performed.   Patient is effective, coaching was needed.    Prehabilitation IS baseline is 2250.    HOME EXERCISE REGIMEN:  We discussed importance of  preventing deconditioning and  muscle wasting in the time preceding surgery as this will occur  post surgery.    > 50 % left main disease present? Unknown-cath pending  EF-- 50%  Active sub lingual nitro use? NO  he is appropriate for initiation  of a home exercise regimen.    he is moderately physically active; current  exercise tolerance/level is high due to no  symptoms with moderate activity.    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     he was educated  that if chest pain or SOB occurs patient is to stop  immediately and if symptoms do not  resolve after stopping to call 911.    he was also encouraged to practice sit to stand  from a chair with one arm to assist or no arm assistance  12 times a day to strengthen quadriceps and improve balance.    CAD:  Patient does not have known CAD; education on  cholesterol, diet, and the heart are not indicated  and were not provided.    OBESITY:  Patient's BMI is barely over 30.  Education regarding portion sizing  and diet are indicated.    MALNUTRITION:  Malnutrition screening tool (MST) shows he is at risk  with a score of 0. (0-1 not at risk; greater or equal to 2 is at risk).    Given MST score, functional capacity,   and no reported muscle loss, it was not  recommended they increase their protein  intake to 1.2 to 2.5 gram/kg/day    SMOKING:  Patient denies current smoking history.  Smoking risks  and cessation education not indicated and was not provided.    ALCOHOL ABUSE:  Patient denies alcohol abuse.  Alcohol risks and cessation  education not indicated and was not provided.    ILLEGAL DRUG USE:  Patient denies illicit drug use.  Illicit drug use risks  and cessation education not indicated and was not provided    SOCIAL SUPPORT SYSTEM FOR DISCHARGE NEEDS:    Patient does have family,  his wife Ebony to stay for  1-week post discharge to assist with ADL's. No barriers  to  hospital discharge anticipated.    PSYCHOLOGICAL PREPARATION:  We discussed the basics of physical limitation post op to include:               No driving for 4 weeks               No lifting, pushing or pulling > 10 lbs for 6 weeks  Sternal precautions to include moving within the tube and  safe mobility in and out of bed and the chair.    ANTIBIOTIC STEWARDSHIP:  MRSA swab will be collected by [facility] during pre-admit testing.    MEDICATION AN UPCOMING APPOINTMENTS REVIEW:  Current medications were reviewed that may need  specific stop dates prior to surgery. The patient was instructed   to stop the following medications after the indicated date.    Lisinopril to stop 2 full days prior to surgery; last dose 8/1    Vascular scheduling sheet was provided and explained.    Walk test Times: 4 4 4    A phone appointment for pre op education was made  for July 31 st at 1030 to review all provided education materials.

## 2023-07-19 NOTE — PROGRESS NOTES
"    Cardiology Initial Consultation Note    Date of note:    7/19/2023      Patient Name: Jasis Rooney   YOB: 1970  MRN:              [id number]    Chief Complaint: Aortic stenosis    Jassi Rooney is a 53 y.o. male  patient presented today with severe aortic stenosis. Since last 3 years he as fatigue, difficulty exercising, decreased exercise tolerance.  He works at [employer] currently.        Past Medical History:   Diagnosis Date    Hypertension              Current Outpatient Medications   Medication Sig Dispense Refill    lisinopril (PRINIVIL) 20 MG Tab       atorvastatin (LIPITOR) 20 MG Tab        No current facility-administered medications for this visit.             Physical Exam:  Ambulatory Vitals  /72 (BP Location: Left arm, Patient Position: Sitting, BP Cuff Size: Adult)   Pulse 73   Resp 14   Ht 1.803 m (5' 11\")   Wt 98 kg (216 lb)   SpO2 97%    Oxygen Therapy:  Pulse Oximetry: 97 %  BP Readings from Last 4 Encounters:   07/19/23 114/72   02/24/23 118/78   01/14/20 148/100   09/18/19 123/81       Weight/BMI: Body mass index is 30.13 kg/m².  Wt Readings from Last 4 Encounters:   07/19/23 98 kg (216 lb)   02/24/23 98 kg (216 lb)   01/14/20 96.6 kg (212 lb 15.4 oz)   09/18/19 94 kg (207 lb 3.7 oz)           General: Well appearing and in no apparent distress  Neck: carotid bruits absent  Lungs: respiratory sounds  normal  Heart: Regular rhythm,  No palpable thrills on palpation, murmurs present, no rubs,   Lowe extremity edema present.       Echo 6/26/23 reviewed, personally interpreted  Normal EF, critical aortic stenosis      Medical Decision Making:  Problem List Items Addressed This Visit       Severe aortic stenosis    Relevant Orders    EKG (Completed)    CL-LEFT HEART CATHETERIZATION W/ AORTIC ROOT     Other Visit Diagnoses       Nonrheumatic aortic (valve) stenosis            53-year-old male patient with symptomatic critical aortic stenosis, NYHA class II " stage C  Angiogram is scheduled on 7/25/2023.  Had CAT scan done this morning.    Symptomatology, pathophysiology, treatment options for aortic stenosis discussed.  I recommend surgical aortic valve replacement.  Regarding tissue valve versus mechanical valve, he forgets his medications often, would prefer a tissue valve.    Plan is discussed with surgeon Dr. George.      This note was dictated using Dragon speech recognition software.    Dale MEYER  Interventional cardiologist  [facility], [facility]  [address] [address]  Phone: [phone]  Fax: [phone]

## 2023-07-19 NOTE — TELEPHONE ENCOUNTER
Valve Program Functional Assessment:     KCCQ12   1a) Showering/bathin  1b) Walking 1 block on ground: 4  1c) Hurrying or joggin  2) Swellin  3) Fatigue: 3  4) Shortness of breath: 2  5) Sleep sitting up: 5  6) Limited enjoyment of life: 3  7) Spend the rest of your life with HF: 1  8a) Hobbies, recreational activities:2  8b) Working or doing household chores:2  8c) Visiting family or friends: 5    5 meter walk test  1) __4.82____ s/5m  2) __4.55____ s/5m  3) __5.52____ s/5m  AVG:_4.96______     Strength   1) _26_____ kg  2) _20_____ kg  3) _16_____ kg  AVG:__20.6____    SILVESTRE ADLs  Patient independently preforms...   - Bathing: Yes No  - Dressing: Yes No  - Toileting: Yes No  - Transferring: Yes No  - Continence: Yes No  - Feeding: Yes No  Total Score: _6_/6    Living Situation  Patient lives: with spouse    Mobility Aids   Patient uses: none      FRAILTY SCORE: _0_/ 4

## 2023-07-19 NOTE — TELEPHONE ENCOUNTER
Patient is scheduled on 7-25-23 for a Pre TAVR C w/poss with . Patient was told to hold viagra for 72hrs prior and to check in at 9:30 for an 11:30 procedure. H&P was done on 7-19-23 by . Pre admit to call patient.

## 2023-07-19 NOTE — PROGRESS NOTES
Patient in office for pre TAVR consultation with Dr. Hancock. Per provider, patient is a better SAVR candidate. Dr. George to discuss further at appointment later today.    Met with patient and reviewed angiogram date/time and check in process. All questions answered. Patient verbalizes understanding.

## 2023-07-20 ENCOUNTER — APPOINTMENT (OUTPATIENT)
Dept: ADMISSIONS | Facility: MEDICAL CENTER | Age: 53
End: 2023-07-20
Attending: INTERNAL MEDICINE
Payer: COMMERCIAL

## 2023-07-20 ENCOUNTER — TELEPHONE (OUTPATIENT)
Dept: CARDIOLOGY | Facility: MEDICAL CENTER | Age: 53
End: 2023-07-20
Payer: COMMERCIAL

## 2023-07-20 ENCOUNTER — APPOINTMENT (OUTPATIENT)
Dept: ADMISSIONS | Facility: MEDICAL CENTER | Age: 53
DRG: 221 | End: 2023-07-20
Attending: THORACIC SURGERY (CARDIOTHORACIC VASCULAR SURGERY)
Payer: COMMERCIAL

## 2023-07-20 ENCOUNTER — HOSPITAL ENCOUNTER (OUTPATIENT)
Dept: RADIOLOGY | Facility: MEDICAL CENTER | Age: 53
End: 2023-07-20
Attending: THORACIC SURGERY (CARDIOTHORACIC VASCULAR SURGERY)
Payer: COMMERCIAL

## 2023-07-20 DIAGNOSIS — I35.0 SEVERE AORTIC STENOSIS: ICD-10-CM

## 2023-07-20 PROCEDURE — 93880 EXTRACRANIAL BILAT STUDY: CPT

## 2023-07-20 NOTE — TELEPHONE ENCOUNTER
Received call from patient with questions regarding disability paperwork. All questions answered. Patient verbalizes understanding.

## 2023-07-24 ENCOUNTER — PRE-ADMISSION TESTING (OUTPATIENT)
Dept: ADMISSIONS | Facility: MEDICAL CENTER | Age: 53
End: 2023-07-24
Attending: INTERNAL MEDICINE
Payer: COMMERCIAL

## 2023-07-24 RX ORDER — CHLORAL HYDRATE 500 MG
500 CAPSULE ORAL DAILY
Status: ON HOLD | COMMUNITY
End: 2023-08-13

## 2023-07-24 RX ORDER — MULTIVITAMIN WITH IRON
250 TABLET ORAL DAILY
COMMUNITY

## 2023-07-24 RX ORDER — MULTIVIT WITH MINERALS/LUTEIN
1000 TABLET ORAL DAILY
Status: ON HOLD | COMMUNITY
End: 2023-08-13

## 2023-07-24 RX ORDER — MULTIVIT WITH MINERALS/LUTEIN
1000 TABLET ORAL DAILY
COMMUNITY

## 2023-07-24 RX ORDER — GINGER ROOT/GINGER ROOT EXT 262.5 MG
1 CAPSULE ORAL DAILY
COMMUNITY

## 2023-07-24 RX ORDER — DIMENHYDRINATE 50 MG
1000 TABLET ORAL DAILY
COMMUNITY

## 2023-07-24 RX ORDER — VIT C/B6/B5/MAGNESIUM/HERB 173 50-5-6-5MG
500 CAPSULE ORAL DAILY
COMMUNITY

## 2023-07-25 ENCOUNTER — HOSPITAL ENCOUNTER (OUTPATIENT)
Facility: MEDICAL CENTER | Age: 53
End: 2023-07-25
Attending: INTERNAL MEDICINE | Admitting: INTERNAL MEDICINE
Payer: COMMERCIAL

## 2023-07-25 ENCOUNTER — APPOINTMENT (OUTPATIENT)
Dept: CARDIOLOGY | Facility: MEDICAL CENTER | Age: 53
End: 2023-07-25
Attending: INTERNAL MEDICINE
Payer: COMMERCIAL

## 2023-07-25 VITALS
OXYGEN SATURATION: 96 % | DIASTOLIC BLOOD PRESSURE: 94 MMHG | BODY MASS INDEX: 30.19 KG/M2 | SYSTOLIC BLOOD PRESSURE: 145 MMHG | HEART RATE: 60 BPM | WEIGHT: 215.61 LBS | RESPIRATION RATE: 16 BRPM | TEMPERATURE: 97.1 F | HEIGHT: 71 IN

## 2023-07-25 DIAGNOSIS — I35.0 SEVERE AORTIC STENOSIS: ICD-10-CM

## 2023-07-25 DIAGNOSIS — Z01.810 PRE-PROCEDURAL CARDIOVASCULAR EXAMINATION: ICD-10-CM

## 2023-07-25 DIAGNOSIS — I35.0 NONRHEUMATIC AORTIC (VALVE) STENOSIS: ICD-10-CM

## 2023-07-25 LAB
ALBUMIN SERPL BCP-MCNC: 4.5 G/DL (ref 3.2–4.9)
ALBUMIN/GLOB SERPL: 1.7 G/DL
ALP SERPL-CCNC: 123 U/L (ref 30–99)
ALT SERPL-CCNC: 29 U/L (ref 2–50)
ANION GAP SERPL CALC-SCNC: 9 MMOL/L (ref 7–16)
APTT PPP: 29.6 SEC (ref 24.7–36)
AST SERPL-CCNC: 18 U/L (ref 12–45)
BILIRUB SERPL-MCNC: 0.7 MG/DL (ref 0.1–1.5)
BUN SERPL-MCNC: 12 MG/DL (ref 8–22)
CALCIUM ALBUM COR SERPL-MCNC: 8.7 MG/DL (ref 8.5–10.5)
CALCIUM SERPL-MCNC: 9.1 MG/DL (ref 8.5–10.5)
CHLORIDE SERPL-SCNC: 106 MMOL/L (ref 96–112)
CO2 SERPL-SCNC: 24 MMOL/L (ref 20–33)
CREAT SERPL-MCNC: 0.8 MG/DL (ref 0.5–1.4)
EKG IMPRESSION: NORMAL
ERYTHROCYTE [DISTWIDTH] IN BLOOD BY AUTOMATED COUNT: 40.3 FL (ref 35.9–50)
GFR SERPLBLD CREATININE-BSD FMLA CKD-EPI: 105 ML/MIN/1.73 M 2
GLOBULIN SER CALC-MCNC: 2.6 G/DL (ref 1.9–3.5)
GLUCOSE SERPL-MCNC: 105 MG/DL (ref 65–99)
HCT VFR BLD AUTO: 45.8 % (ref 42–52)
HGB BLD-MCNC: 15.8 G/DL (ref 14–18)
INR PPP: 0.97 (ref 0.87–1.13)
MCH RBC QN AUTO: 29.5 PG (ref 27–33)
MCHC RBC AUTO-ENTMCNC: 34.5 G/DL (ref 32.3–36.5)
MCV RBC AUTO: 85.4 FL (ref 81.4–97.8)
PLATELET # BLD AUTO: 228 K/UL (ref 164–446)
PMV BLD AUTO: 9.8 FL (ref 9–12.9)
POTASSIUM SERPL-SCNC: 4 MMOL/L (ref 3.6–5.5)
PROT SERPL-MCNC: 7.1 G/DL (ref 6–8.2)
PROTHROMBIN TIME: 12.8 SEC (ref 12–14.6)
RBC # BLD AUTO: 5.36 M/UL (ref 4.7–6.1)
SODIUM SERPL-SCNC: 139 MMOL/L (ref 135–145)
WBC # BLD AUTO: 8.2 K/UL (ref 4.8–10.8)

## 2023-07-25 PROCEDURE — C1769 GUIDE WIRE: HCPCS

## 2023-07-25 PROCEDURE — G0278 ILIAC ART ANGIO,CARDIAC CATH: HCPCS | Performed by: INTERNAL MEDICINE

## 2023-07-25 PROCEDURE — 85730 THROMBOPLASTIN TIME PARTIAL: CPT

## 2023-07-25 PROCEDURE — 160036 HCHG PACU - EA ADDL 30 MINS PHASE I

## 2023-07-25 PROCEDURE — 700102 HCHG RX REV CODE 250 W/ 637 OVERRIDE(OP)

## 2023-07-25 PROCEDURE — 99152 MOD SED SAME PHYS/QHP 5/>YRS: CPT | Performed by: INTERNAL MEDICINE

## 2023-07-25 PROCEDURE — 85610 PROTHROMBIN TIME: CPT

## 2023-07-25 PROCEDURE — 160002 HCHG RECOVERY MINUTES (STAT)

## 2023-07-25 PROCEDURE — 700111 HCHG RX REV CODE 636 W/ 250 OVERRIDE (IP): Mod: JZ

## 2023-07-25 PROCEDURE — A9270 NON-COVERED ITEM OR SERVICE: HCPCS | Performed by: INTERNAL MEDICINE

## 2023-07-25 PROCEDURE — 160035 HCHG PACU - 1ST 60 MINS PHASE I

## 2023-07-25 PROCEDURE — 93010 ELECTROCARDIOGRAM REPORT: CPT | Mod: 59 | Performed by: INTERNAL MEDICINE

## 2023-07-25 PROCEDURE — 700117 HCHG RX CONTRAST REV CODE 255: Performed by: INTERNAL MEDICINE

## 2023-07-25 PROCEDURE — 700101 HCHG RX REV CODE 250

## 2023-07-25 PROCEDURE — 80053 COMPREHEN METABOLIC PANEL: CPT

## 2023-07-25 PROCEDURE — 93005 ELECTROCARDIOGRAM TRACING: CPT | Performed by: INTERNAL MEDICINE

## 2023-07-25 PROCEDURE — 93454 CORONARY ARTERY ANGIO S&I: CPT | Mod: 26 | Performed by: INTERNAL MEDICINE

## 2023-07-25 PROCEDURE — A9270 NON-COVERED ITEM OR SERVICE: HCPCS

## 2023-07-25 PROCEDURE — 85027 COMPLETE CBC AUTOMATED: CPT

## 2023-07-25 PROCEDURE — 700102 HCHG RX REV CODE 250 W/ 637 OVERRIDE(OP): Performed by: INTERNAL MEDICINE

## 2023-07-25 RX ORDER — LIDOCAINE HYDROCHLORIDE 20 MG/ML
INJECTION, SOLUTION INFILTRATION; PERINEURAL
Status: COMPLETED
Start: 2023-07-25 | End: 2023-07-25

## 2023-07-25 RX ORDER — ACETAMINOPHEN 500 MG
1000 TABLET ORAL ONCE
Status: COMPLETED | OUTPATIENT
Start: 2023-07-25 | End: 2023-07-25

## 2023-07-25 RX ORDER — MIDAZOLAM HYDROCHLORIDE 1 MG/ML
INJECTION INTRAMUSCULAR; INTRAVENOUS
Status: COMPLETED
Start: 2023-07-25 | End: 2023-07-25

## 2023-07-25 RX ORDER — SODIUM CHLORIDE 9 MG/ML
INJECTION, SOLUTION INTRAVENOUS CONTINUOUS
Status: DISCONTINUED | OUTPATIENT
Start: 2023-07-25 | End: 2023-07-25 | Stop reason: HOSPADM

## 2023-07-25 RX ORDER — HEPARIN SODIUM 200 [USP'U]/100ML
INJECTION, SOLUTION INTRAVENOUS
Status: COMPLETED
Start: 2023-07-25 | End: 2023-07-25

## 2023-07-25 RX ORDER — VERAPAMIL HYDROCHLORIDE 2.5 MG/ML
INJECTION, SOLUTION INTRAVENOUS
Status: COMPLETED
Start: 2023-07-25 | End: 2023-07-25

## 2023-07-25 RX ORDER — ASPIRIN 81 MG/1
TABLET, CHEWABLE ORAL
Status: COMPLETED
Start: 2023-07-25 | End: 2023-07-25

## 2023-07-25 RX ORDER — HEPARIN SODIUM 1000 [USP'U]/ML
INJECTION, SOLUTION INTRAVENOUS; SUBCUTANEOUS
Status: COMPLETED
Start: 2023-07-25 | End: 2023-07-25

## 2023-07-25 RX ADMIN — NITROGLYCERIN 10 ML: 20 INJECTION INTRAVENOUS at 10:04

## 2023-07-25 RX ADMIN — ACETAMINOPHEN 1000 MG: 500 TABLET, FILM COATED ORAL at 11:08

## 2023-07-25 RX ADMIN — FENTANYL CITRATE 100 MCG: 50 INJECTION, SOLUTION INTRAMUSCULAR; INTRAVENOUS at 10:24

## 2023-07-25 RX ADMIN — LIDOCAINE HYDROCHLORIDE: 20 INJECTION, SOLUTION INFILTRATION; PERINEURAL at 10:04

## 2023-07-25 RX ADMIN — IOHEXOL 40 ML: 350 INJECTION, SOLUTION INTRAVENOUS at 10:27

## 2023-07-25 RX ADMIN — HEPARIN SODIUM 2000 UNITS: 200 INJECTION, SOLUTION INTRAVENOUS at 10:04

## 2023-07-25 RX ADMIN — VERAPAMIL HYDROCHLORIDE 5 MG: 2.5 INJECTION, SOLUTION INTRAVENOUS at 10:04

## 2023-07-25 RX ADMIN — HEPARIN SODIUM: 1000 INJECTION, SOLUTION INTRAVENOUS; SUBCUTANEOUS at 10:04

## 2023-07-25 RX ADMIN — MIDAZOLAM 2 MG: 1 INJECTION, SOLUTION INTRAMUSCULAR; INTRAVENOUS at 10:24

## 2023-07-25 RX ADMIN — ASPIRIN 81 MG 324 MG: 81 TABLET ORAL at 09:57

## 2023-07-25 ASSESSMENT — PAIN DESCRIPTION - PAIN TYPE
TYPE: SURGICAL PAIN

## 2023-07-25 NOTE — OR NURSING
Arrived to PACU in stable condition. Site RRA with TR band in place. Medicated with Tylenol for back pain. Wife updated. TR band air removed q15 min until hemostasis achieved. Dry gauze and Tegaderm placed. Patient instructed on care for site.

## 2023-07-25 NOTE — PROCEDURES
Cardiac Catheterization report    7/25/2023  10:38 AM    Primary Care Provider: Steven Jones M.D.    Indication for procedure: Severe aortic stenosis    Procedures performed:   Coronary arteriograms  Distal abdominal aortogram with iliac runoff    Final impression:  Non-obstructive minimal coronary artery disease      Findings:  1.  Left main coronary artery:  Normal.  2.  Left anterior descending artery: Apical LAD has luminal irregularities.  3.  Left circumflex coronary artery:  Normal.   4.  Right coronary artery:  Normal.  This is a right dominant system.  Distal abdominal aortogram with iliac runoff showed widely patent iliofemoral vessels      EBL: <10 CC    Specimens: None    Procedure details:  The risks and benefits of cardiac catheterization and possible intervention were explained to the patient including death, heart attack, stroke, and emergency surgery.  The patient verbalized understanding and wished to proceed.  The patient was brought to the cardiac catheterization laboratory in the fasting state and prepped and draped in the usual sterile fashion.  Timeout was performed.  The right wrist was locally anesthetized with lidocaine and the right radial artery was cannulated with 5/6-Malay equipment and standard radial cocktail was given.  Coronary angiography was performed using JR 4 and JL 3.5  diagnostic catheters in the usual fashion, results mentioned below.  Pigtail catheter was used to perform distal abdominal aortogram with iliac runoff.      Once all the views were obtained, all wires and catheters were removed from the patient without difficulty.  A Vasc-Band was placed over the right radial artery and the radial artery sheath was removed without difficulty.      Complications:  None    Contrast: 40 cc    Sedation time:  I supervised moderate sedation over a trained independent nursing staff,  Sedation Start time:  10:06   Sedation Stop time: 10:26      BETSY Martínez  [facility]

## 2023-07-25 NOTE — DISCHARGE INSTRUCTIONS
HOME CARE INSTRUCTIONS    ACTIVITY: Rest and take it easy for the first 24 hours.  A responsible adult is recommended to remain with you during that time.  It is normal to feel sleepy.  We encourage you to not do anything that requires balance, judgment or coordination.    FOR 24 HOURS DO NOT:  Drive, operate machinery or run household appliances.  Drink beer or alcoholic beverages.  Make important decisions or sign legal documents.    SPECIAL INSTRUCTIONS: POST ANGIOGRAM  General Care Instructions  Maintain a bandage over the incision site for 24 hours.  It's normal to find a small bruise or dime-sized lump at the insertion site. This should disappear within a few weeks.  Do not apply lotions or powders to the site.  Do not immerse the catheter insertion site in water (bathtub/swimming) for five days. It is ok to shower 24 hours after the procedure.  You may resume your normal diet immediately; on the day of your procedure, drink 6-10 glasses of water to help flush the contrast liquid out of your system.  If the doctor inserted the catheter in through your groin:  Walking short distances on a flat surface is OK. Limit going up/down stairs for the first 2 days.  DO NOT do yard work, drive, squat, lift heavy objects, or play sports for 2 days; or until your health care provider tells you it is OK.  If the doctor inserted the catheter in your arm:  For 24 hours, DO NOT lift anything heavier than 10 pounds (approximately a gallon of milk). DO NOT do any heavy pushing, pulling, or twisting.    Medications  If your current medications need to be changed, you will be provided with an updated list of your medications prior to discharge.  If you take warfarin (Coumadin), resume taking your usual dose the evening after the procedure.  DO NOT STOP taking prescribed blood thinning (anti-platelet) medications unless instructed by your cardiologist.  These medications include:  Aspirin, Clopidogrel (Plavix), Ticagrelor  (Brilinta), or Prasugrel (Effient)   If you take one of the following anticoagulants, RESUME 24 HOURS after your procedure:  Apixiban (Eliquis), Rivaroxaban (Xarelto), Dabigatran (Pradaxa), Edoxaban (Savaysa)  If you take metformin (Glucophage), RESUME 48 HOURS after your procedure.    When to call your healthcare provider  Call your cardiologist right away at [phone] if you have any of the following:   Problems/Concerns taking any of your prescribed heart medicines.   The insertion site has increasing pain, swelling, redness, bleeding, or drainage.   Your arm or leg below where the insertion site changes color, is cool, or is numb.   You have chest pain or shortness of breath that does not go away with rest.   Your pulse feels irregular -- very slow (less than 60 beats/minute) or very fast (over 100 beats/minute).   You have dizziness, fainting, or you are very tired.   You are coughing up blood or yellow or green mucus.   You have chills or a fever over 101°F (38.3°C).    If there is bleeding at the catheter insertion site, apply pressure for 10 minutes.  If bleeding persists, call 911, and continue to hold pressure until advanced medical support arrives.        Exercising Safely After Percutaneous Coronary Intervention (PCI)  After percutaneous coronary intervention (PCI), which involves angioplasty and often stenting, it's important to focus on your heart health. Exercise can help strengthen your heart. It can also help you feel good and improve your overall health. Talk with your health care provider or cardiac rehab team member about good options for you.  Start slowly. Work up to more vigorous exercise as you get stronger. Aim for at least 150 minutes of exercise each week.  Include aerobic activities. These make the heart beat faster. They work the heart and lungs, and improve the body's ability to use oxygen. Good choices include walking, swimming, and biking .  Always follow your doctor's  recommendation for exercise.   You have been referred to cardiac rehabilitation, which is important for your recovery.  You may contact [facility] at [phone] to learn more and schedule a visit.        Lifestyle Management After Percutaneous Coronary Intervention (PCI)  Percutaneous coronary intervention (PCI)  involves angioplasty and often stenting. This procedure can open arteries and relieve symptoms. But, it doesn't cure coronary artery disease. New blockages can still form. You need to take steps to prevent this by managing risk factors. Doing so will help make your heart and arteries healthier. Your doctor may prescribe cardiac rehabilitation to help with this lifelong process.  Understanding risk factors  Some risk factors for coronary artery disease can be controlled. These include smoking, high blood pressure, cholesterol, diabetes, and obesity. They can be managed with medication, diet, and exercise. Support and counseling can also play a role. The effort will pay off! Managing risk factors can help you be more active, feel better, and reduce the risk of heart attack.    If you smoke, quit!  If your doctor has been urging you to quit smoking, it's for good reasons. Smoking damages your heart, blood vessels, and lungs. The good news is that quitting can halt or even reverse the damage of smoking. To quit now:  Get medical help. Ask your doctor for advice on stop-smoking programs. Also ask about medications or nicotine replacement therapy products that may help you quit smoking.  Get support. Join a support group. Ask for help from your family and friends.  Don't give up. It often takes several tries to succeed in quitting smoking.  Avoid secondhand smoke. Ask family and friends not to smoke around you.     DIET: To avoid nausea, slowly advance diet as tolerated, avoiding spicy or greasy foods for the first day.  Add more substantial food to your diet according to your  physician's instructions.  Babies can be fed formula or breast milk as soon as they are hungry.  INCREASE FLUIDS AND FIBER TO AVOID CONSTIPATION.    SURGICAL DRESSING/BATHING: Keep dressing on for 24 hours, then you may remove the dressing and shower. No submerging.     MEDICATIONS: Resume taking daily medication.  Take prescribed pain medication with food.  If no medication is prescribed, you may take non-aspirin pain medication if needed.  PAIN MEDICATION CAN BE VERY CONSTIPATING.  Take a stool softener or laxative such as senokot, pericolace, or milk of magnesia if needed.    Prescription given for _________.  Last pain medication given at _________.    A follow-up appointment should be arranged with your doctor in 1-2 weeks; call to schedule.    You should CALL YOUR PHYSICIAN if you develop:  Fever greater than 101 degrees F.  Pain not relieved by medication, or persistent nausea or vomiting.  Excessive bleeding (blood soaking through dressing) or unexpected drainage from the wound.  Extreme redness or swelling around the incision site, drainage of pus or foul smelling drainage.  Inability to urinate or empty your bladder within 8 hours.  Problems with breathing or chest pain.    You should call 911 if you develop problems with breathing or chest pain.  If you are unable to contact your doctor or surgical center, you should go to the nearest emergency room or urgent care center.  Physician's telephone #: [phone]    MILD FLU-LIKE SYMPTOMS ARE NORMAL.  YOU MAY EXPERIENCE GENERALIZED MUSCLE ACHES, THROAT IRRITATION, HEADACHE AND/OR SOME NAUSEA.    If any questions arise, call your doctor.  If your doctor is not available, please feel free to call the Surgical Center at [phone].  The Center is open Monday through Friday from 7AM to 7PM.      A registered nurse may call you a few days after your surgery to see how you are doing after your procedure.    You may also receive a survey in the mail within the  next two weeks and we ask that you take a few moments to complete the survey and return it to us.  Our goal is to provide you with very good care and we value your comments.     Depression / Suicide Risk    As you are discharged from this [facility] facility, it is important to learn how to keep safe from harming yourself.    Recognize the warning signs:  Abrupt changes in personality, positive or negative- including increase in energy   Giving away possessions  Change in eating patterns- significant weight changes-  positive or negative  Change in sleeping patterns- unable to sleep or sleeping all the time   Unwillingness or inability to communicate  Depression  Unusual sadness, discouragement and loneliness  Talk of wanting to die  Neglect of personal appearance   Rebelliousness- reckless behavior  Withdrawal from people/activities they love  Confusion- inability to concentrate     If you or a loved one observes any of these behaviors or has concerns about self-harm, here's what you can do:  Talk about it- your feelings and reasons for harming yourself  Remove any means that you might use to hurt yourself (examples: pills, rope, extension cords, firearm)  Get professional help from the community (Mental Health, Substance Abuse, psychological counseling)  Do not be alone:Call your Safe Contact- someone whom you trust who will be there for you.  Call your local CRISIS HOTLINE 156-1219 or 154-078-8111  Call your local Children's Mobile Crisis Response Team [place] (905) 622-2040 or www.Wikirin  Call the toll free National Suicide Prevention Hotlines   National Suicide Prevention Lifeline 566-219-BNGE (7544)  National Hope Line Network 800-SUICIDE (610-9774)    I acknowledge receipt and understanding of these Home Care instructions.

## 2023-07-26 ENCOUNTER — TELEPHONE (OUTPATIENT)
Dept: SURGERY | Facility: MEDICAL CENTER | Age: 53
End: 2023-07-26
Payer: COMMERCIAL

## 2023-07-26 NOTE — TELEPHONE ENCOUNTER
"""Informed patient that their capsular opacification is not visually significant or does not meet the minimum criteria for capsulotomy.  Recommended attention to PCO symptoms Spoke to Ebony Pt aware of DOS Change to Thursday 8/3

## 2023-07-28 ENCOUNTER — TELEPHONE (OUTPATIENT)
Dept: CARDIOTHORACIC SURGERY | Facility: MEDICAL CENTER | Age: 53
End: 2023-07-28
Payer: COMMERCIAL

## 2023-07-28 NOTE — TELEPHONE ENCOUNTER
Chief Concern: needs tooth extraction set for 8/2, day before surgery. They are starting him on ABX today.    Messaged MICAH Brooks and will await call back for plan.    Received call back, dentist will extract the tooth today.    Updated messaged to MICAH Brooks.    No changes to surgical plan; APRN to inform surgeon. Patient aware no changes to surgical plan.    Call times 10 minutes

## 2023-07-28 NOTE — TELEPHONE ENCOUNTER
After APRN spoke with surgeon, plan to move surgery out 1 week to 8/9.    Called Jeff Epstein to call patient and reschedule PAT for the 7th or 8th.    This was relayed to his wife Ebony, she is aware that check in and location for surgery is all the same, just the date has changed.  She is aware pre admit will call for new testing date and time.    Call time 10 minutes

## 2023-08-04 ENCOUNTER — TELEPHONE (OUTPATIENT)
Dept: CARDIOTHORACIC SURGERY | Facility: MEDICAL CENTER | Age: 53
End: 2023-08-04
Payer: COMMERCIAL

## 2023-08-04 NOTE — TELEPHONE ENCOUNTER
Patient was reminded that AVR surgery with  Dr. George is on 8/9 at 0730 in the morning. he   are aware check in is at 0515 am.    Baseline IS was 2250. he has been compliant   with the IS. Volume has improved to 2750.    he was prescribed a walking regimen or  told to continue he current regimen and   he has been compliant.   he has been practicing sit to stand.    The CHG wipes instructions were reviewed and understood.    PAT date and time was reviewed with patient and  verified it is within 72 hours of surgery.    Vascular studies and procedures: carotids and cath  were scheduled, completed,  and reviewed by myself for concerning  results did not need escalation to the ANNY/MD.    he did need a dental check/work and was compliant with a tooth extraction.  Amoxacillin last dose is tonight.    he was reminded that lisinopril needs to stop after 8/6.    he was told that no medication(s) are okay to  take the morning of surgery prior to check in.     he was reminded no food after midnight.    Call time 11 minutes

## 2023-08-08 ENCOUNTER — HOSPITAL ENCOUNTER (OUTPATIENT)
Dept: RADIOLOGY | Facility: MEDICAL CENTER | Age: 53
DRG: 221 | End: 2023-08-08
Attending: THORACIC SURGERY (CARDIOTHORACIC VASCULAR SURGERY)
Payer: COMMERCIAL

## 2023-08-08 ENCOUNTER — ANESTHESIA EVENT (OUTPATIENT)
Dept: SURGERY | Facility: MEDICAL CENTER | Age: 53
DRG: 221 | End: 2023-08-08
Payer: COMMERCIAL

## 2023-08-08 ENCOUNTER — PRE-ADMISSION TESTING (OUTPATIENT)
Dept: ADMISSIONS | Facility: MEDICAL CENTER | Age: 53
DRG: 221 | End: 2023-08-08
Attending: THORACIC SURGERY (CARDIOTHORACIC VASCULAR SURGERY)
Payer: COMMERCIAL

## 2023-08-08 DIAGNOSIS — Z01.811 PRE-OPERATIVE RESPIRATORY EXAMINATION: ICD-10-CM

## 2023-08-08 DIAGNOSIS — Z01.812 PRE-OPERATIVE LABORATORY EXAMINATION: ICD-10-CM

## 2023-08-08 DIAGNOSIS — Z01.810 PRE-OPERATIVE CARDIOVASCULAR EXAMINATION: ICD-10-CM

## 2023-08-08 LAB
ABO GROUP BLD: NORMAL
ALBUMIN SERPL BCP-MCNC: 4.7 G/DL (ref 3.2–4.9)
ALBUMIN/GLOB SERPL: 1.9 G/DL
ALP SERPL-CCNC: 127 U/L (ref 30–99)
ALT SERPL-CCNC: 27 U/L (ref 2–50)
AMPHET UR QL SCN: NEGATIVE
ANION GAP SERPL CALC-SCNC: 10 MMOL/L (ref 7–16)
APPEARANCE UR: CLEAR
APTT PPP: 31.7 SEC (ref 24.7–36)
AST SERPL-CCNC: 13 U/L (ref 12–45)
BARBITURATES UR QL SCN: NEGATIVE
BASOPHILS # BLD AUTO: 0.5 % (ref 0–1.8)
BASOPHILS # BLD: 0.04 K/UL (ref 0–0.12)
BENZODIAZ UR QL SCN: NEGATIVE
BILIRUB SERPL-MCNC: 0.5 MG/DL (ref 0.1–1.5)
BILIRUB UR QL STRIP.AUTO: NEGATIVE
BLD GP AB SCN SERPL QL: NORMAL
BUN SERPL-MCNC: 12 MG/DL (ref 8–22)
BZE UR QL SCN: NEGATIVE
CALCIUM ALBUM COR SERPL-MCNC: 8.7 MG/DL (ref 8.5–10.5)
CALCIUM SERPL-MCNC: 9.3 MG/DL (ref 8.5–10.5)
CANNABINOIDS UR QL SCN: NEGATIVE
CHLORIDE SERPL-SCNC: 104 MMOL/L (ref 96–112)
CO2 SERPL-SCNC: 25 MMOL/L (ref 20–33)
COLOR UR: YELLOW
CREAT SERPL-MCNC: 0.89 MG/DL (ref 0.5–1.4)
EKG IMPRESSION: NORMAL
EOSINOPHIL # BLD AUTO: 0.27 K/UL (ref 0–0.51)
EOSINOPHIL NFR BLD: 3.4 % (ref 0–6.9)
ERYTHROCYTE [DISTWIDTH] IN BLOOD BY AUTOMATED COUNT: 40.2 FL (ref 35.9–50)
EST. AVERAGE GLUCOSE BLD GHB EST-MCNC: 114 MG/DL
FENTANYL UR QL: NEGATIVE
GFR SERPLBLD CREATININE-BSD FMLA CKD-EPI: 102 ML/MIN/1.73 M 2
GLOBULIN SER CALC-MCNC: 2.5 G/DL (ref 1.9–3.5)
GLUCOSE SERPL-MCNC: 85 MG/DL (ref 65–99)
GLUCOSE UR STRIP.AUTO-MCNC: NEGATIVE MG/DL
HBA1C MFR BLD: 5.6 % (ref 4–5.6)
HCT VFR BLD AUTO: 45.9 % (ref 42–52)
HGB BLD-MCNC: 16 G/DL (ref 14–18)
IMM GRANULOCYTES # BLD AUTO: 0.04 K/UL (ref 0–0.11)
IMM GRANULOCYTES NFR BLD AUTO: 0.5 % (ref 0–0.9)
INR PPP: 0.97 (ref 0.87–1.13)
KETONES UR STRIP.AUTO-MCNC: NEGATIVE MG/DL
LEUKOCYTE ESTERASE UR QL STRIP.AUTO: NEGATIVE
LYMPHOCYTES # BLD AUTO: 2.38 K/UL (ref 1–4.8)
LYMPHOCYTES NFR BLD: 29.7 % (ref 22–41)
MCH RBC QN AUTO: 29.5 PG (ref 27–33)
MCHC RBC AUTO-ENTMCNC: 34.9 G/DL (ref 32.3–36.5)
MCV RBC AUTO: 84.7 FL (ref 81.4–97.8)
METHADONE UR QL SCN: NEGATIVE
MICRO URNS: NORMAL
MONOCYTES # BLD AUTO: 0.77 K/UL (ref 0–0.85)
MONOCYTES NFR BLD AUTO: 9.6 % (ref 0–13.4)
NEUTROPHILS # BLD AUTO: 4.52 K/UL (ref 1.82–7.42)
NEUTROPHILS NFR BLD: 56.3 % (ref 44–72)
NITRITE UR QL STRIP.AUTO: NEGATIVE
NRBC # BLD AUTO: 0 K/UL
NRBC BLD-RTO: 0 /100 WBC (ref 0–0.2)
OPIATES UR QL SCN: NEGATIVE
OXYCODONE UR QL SCN: NEGATIVE
PCP UR QL SCN: NEGATIVE
PH UR STRIP.AUTO: 5.5 [PH] (ref 5–8)
PLATELET # BLD AUTO: 265 K/UL (ref 164–446)
PMV BLD AUTO: 9.7 FL (ref 9–12.9)
POTASSIUM SERPL-SCNC: 4.1 MMOL/L (ref 3.6–5.5)
PROPOXYPH UR QL SCN: NEGATIVE
PROT SERPL-MCNC: 7.2 G/DL (ref 6–8.2)
PROT UR QL STRIP: NEGATIVE MG/DL
PROTHROMBIN TIME: 12.8 SEC (ref 12–14.6)
RBC # BLD AUTO: 5.42 M/UL (ref 4.7–6.1)
RBC UR QL AUTO: NEGATIVE
RH BLD: NORMAL
SCCMEC + MECA PNL NOSE NAA+PROBE: NEGATIVE
SCCMEC + MECA PNL NOSE NAA+PROBE: NEGATIVE
SODIUM SERPL-SCNC: 139 MMOL/L (ref 135–145)
SP GR UR STRIP.AUTO: 1.03
UROBILINOGEN UR STRIP.AUTO-MCNC: 0.2 MG/DL
WBC # BLD AUTO: 8 K/UL (ref 4.8–10.8)

## 2023-08-08 PROCEDURE — 93010 ELECTROCARDIOGRAM REPORT: CPT | Performed by: INTERNAL MEDICINE

## 2023-08-08 PROCEDURE — 93005 ELECTROCARDIOGRAM TRACING: CPT

## 2023-08-08 PROCEDURE — 80053 COMPREHEN METABOLIC PANEL: CPT

## 2023-08-08 PROCEDURE — 86901 BLOOD TYPING SEROLOGIC RH(D): CPT

## 2023-08-08 PROCEDURE — 81003 URINALYSIS AUTO W/O SCOPE: CPT

## 2023-08-08 PROCEDURE — 86900 BLOOD TYPING SEROLOGIC ABO: CPT

## 2023-08-08 PROCEDURE — 87641 MR-STAPH DNA AMP PROBE: CPT

## 2023-08-08 PROCEDURE — 71046 X-RAY EXAM CHEST 2 VIEWS: CPT

## 2023-08-08 PROCEDURE — 36415 COLL VENOUS BLD VENIPUNCTURE: CPT

## 2023-08-08 PROCEDURE — 85025 COMPLETE CBC W/AUTO DIFF WBC: CPT

## 2023-08-08 PROCEDURE — 83036 HEMOGLOBIN GLYCOSYLATED A1C: CPT

## 2023-08-08 PROCEDURE — 85610 PROTHROMBIN TIME: CPT

## 2023-08-08 PROCEDURE — 86850 RBC ANTIBODY SCREEN: CPT

## 2023-08-08 PROCEDURE — 87640 STAPH A DNA AMP PROBE: CPT

## 2023-08-08 PROCEDURE — 80307 DRUG TEST PRSMV CHEM ANLYZR: CPT

## 2023-08-08 PROCEDURE — 85730 THROMBOPLASTIN TIME PARTIAL: CPT

## 2023-08-08 RX ORDER — DEXMEDETOMIDINE HYDROCHLORIDE 4 UG/ML
0-1.5 INJECTION, SOLUTION INTRAVENOUS CONTINUOUS
Status: DISCONTINUED | OUTPATIENT
Start: 2023-08-09 | End: 2023-08-09

## 2023-08-08 RX ORDER — NOREPINEPHRINE BITARTRATE 0.03 MG/ML
0-1 INJECTION, SOLUTION INTRAVENOUS CONTINUOUS
Status: DISCONTINUED | OUTPATIENT
Start: 2023-08-09 | End: 2023-08-09

## 2023-08-08 RX ORDER — EPINEPHRINE HCL IN 0.9 % NACL 4MG/250ML
0-.5 PLASTIC BAG, INJECTION (ML) INTRAVENOUS CONTINUOUS
Status: DISCONTINUED | OUTPATIENT
Start: 2023-08-09 | End: 2023-08-09

## 2023-08-08 RX ORDER — METHADONE HYDROCHLORIDE 5 MG/1
10 TABLET ORAL ONCE
Status: DISCONTINUED | OUTPATIENT
Start: 2023-08-08 | End: 2023-08-08

## 2023-08-08 RX ORDER — METHADONE HYDROCHLORIDE 10 MG/ML
20 INJECTION, SOLUTION INTRAMUSCULAR; INTRAVENOUS; SUBCUTANEOUS ONCE
Status: COMPLETED | OUTPATIENT
Start: 2023-08-09 | End: 2023-08-09

## 2023-08-09 ENCOUNTER — APPOINTMENT (OUTPATIENT)
Dept: CARDIOLOGY | Facility: MEDICAL CENTER | Age: 53
DRG: 221 | End: 2023-08-09
Attending: ANESTHESIOLOGY
Payer: COMMERCIAL

## 2023-08-09 ENCOUNTER — HOSPITAL ENCOUNTER (INPATIENT)
Facility: MEDICAL CENTER | Age: 53
LOS: 4 days | DRG: 221 | End: 2023-08-13
Attending: THORACIC SURGERY (CARDIOTHORACIC VASCULAR SURGERY) | Admitting: THORACIC SURGERY (CARDIOTHORACIC VASCULAR SURGERY)
Payer: COMMERCIAL

## 2023-08-09 ENCOUNTER — APPOINTMENT (OUTPATIENT)
Dept: RADIOLOGY | Facility: MEDICAL CENTER | Age: 53
DRG: 221 | End: 2023-08-09
Attending: THORACIC SURGERY (CARDIOTHORACIC VASCULAR SURGERY)
Payer: COMMERCIAL

## 2023-08-09 ENCOUNTER — ANESTHESIA (OUTPATIENT)
Dept: SURGERY | Facility: MEDICAL CENTER | Age: 53
DRG: 221 | End: 2023-08-09
Payer: COMMERCIAL

## 2023-08-09 DIAGNOSIS — Z01.811 PRE-OPERATIVE RESPIRATORY EXAMINATION: ICD-10-CM

## 2023-08-09 DIAGNOSIS — G89.18 POST-OP PAIN: ICD-10-CM

## 2023-08-09 DIAGNOSIS — Z95.2 S/P AVR (AORTIC VALVE REPLACEMENT): ICD-10-CM

## 2023-08-09 DIAGNOSIS — Z01.812 PRE-OPERATIVE LABORATORY EXAMINATION: ICD-10-CM

## 2023-08-09 DIAGNOSIS — Z01.810 PRE-OPERATIVE CARDIOVASCULAR EXAMINATION: ICD-10-CM

## 2023-08-09 PROBLEM — Z99.11 ENCOUNTER FOR WEANING FROM VENTILATOR (HCC): Status: ACTIVE | Noted: 2023-08-09

## 2023-08-09 LAB
ABO + RH BLD: NORMAL
ACT BLD: 125 SEC (ref 74–137)
ACT BLD: 137 SEC (ref 74–137)
ACT BLD: 600 SEC (ref 74–137)
ACT BLD: 805 SEC (ref 74–137)
ACT BLD: >1000 SEC (ref 74–137)
ACT BLD: >1000 SEC (ref 74–137)
APTT PPP: 34.5 SEC (ref 24.7–36)
BASE EXCESS BLDA CALC-SCNC: -1 MMOL/L (ref -4–3)
BASE EXCESS BLDA CALC-SCNC: -2 MMOL/L (ref -4–3)
BASE EXCESS BLDA CALC-SCNC: -3 MMOL/L (ref -4–3)
BASE EXCESS BLDA CALC-SCNC: -4 MMOL/L (ref -4–3)
BASE EXCESS BLDA CALC-SCNC: -4 MMOL/L (ref -4–3)
BASE EXCESS BLDA CALC-SCNC: -5 MMOL/L (ref -4–3)
BASE EXCESS BLDA CALC-SCNC: -5 MMOL/L (ref -4–3)
BASE EXCESS BLDV CALC-SCNC: -1 MMOL/L (ref -4–3)
BODY TEMPERATURE: ABNORMAL DEGREES
CA-I BLD ISE-SCNC: 0.94 MMOL/L (ref 1.1–1.3)
CA-I BLD ISE-SCNC: 0.98 MMOL/L (ref 1.1–1.3)
CA-I BLD ISE-SCNC: 1.02 MMOL/L (ref 1.1–1.3)
CA-I BLD ISE-SCNC: 1.14 MMOL/L (ref 1.1–1.3)
CA-I BLD ISE-SCNC: 1.16 MMOL/L (ref 1.1–1.3)
CA-I BLD ISE-SCNC: 1.22 MMOL/L (ref 1.1–1.3)
CA-I BLD ISE-SCNC: 1.24 MMOL/L (ref 1.1–1.3)
CO2 BLDA-SCNC: 20 MMOL/L (ref 20–33)
CO2 BLDA-SCNC: 21 MMOL/L (ref 20–33)
CO2 BLDA-SCNC: 23 MMOL/L (ref 20–33)
CO2 BLDA-SCNC: 24 MMOL/L (ref 20–33)
CO2 BLDA-SCNC: 24 MMOL/L (ref 20–33)
CO2 BLDA-SCNC: 25 MMOL/L (ref 20–33)
CO2 BLDA-SCNC: 25 MMOL/L (ref 20–33)
CO2 BLDV-SCNC: 25 MMOL/L (ref 20–33)
DELSYS IDSYS: ABNORMAL
DELSYS IDSYS: ABNORMAL
EKG IMPRESSION: NORMAL
END TIDAL CARBON DIOXIDE IECO2: 28 MMHG
END TIDAL CARBON DIOXIDE IECO2: 28 MMHG
GLUCOSE BLD STRIP.AUTO-MCNC: 102 MG/DL (ref 65–99)
GLUCOSE BLD STRIP.AUTO-MCNC: 125 MG/DL (ref 65–99)
GLUCOSE BLD STRIP.AUTO-MCNC: 136 MG/DL (ref 65–99)
GLUCOSE BLD STRIP.AUTO-MCNC: 149 MG/DL (ref 65–99)
GLUCOSE BLD STRIP.AUTO-MCNC: 153 MG/DL (ref 65–99)
GLUCOSE BLD STRIP.AUTO-MCNC: 155 MG/DL (ref 65–99)
HCO3 BLDA-SCNC: 19.1 MMOL/L (ref 17–25)
HCO3 BLDA-SCNC: 20.3 MMOL/L (ref 17–25)
HCO3 BLDA-SCNC: 22 MMOL/L (ref 17–25)
HCO3 BLDA-SCNC: 22.2 MMOL/L (ref 17–25)
HCO3 BLDA-SCNC: 23.2 MMOL/L (ref 17–25)
HCO3 BLDA-SCNC: 23.6 MMOL/L (ref 17–25)
HCO3 BLDA-SCNC: 23.9 MMOL/L (ref 17–25)
HCO3 BLDV-SCNC: 23.4 MMOL/L (ref 24–28)
HCT VFR BLD AUTO: 40.7 % (ref 42–52)
HCT VFR BLD CALC: 32 % (ref 42–52)
HCT VFR BLD CALC: 33 % (ref 42–52)
HCT VFR BLD CALC: 40 % (ref 42–52)
HCT VFR BLD CALC: 44 % (ref 42–52)
HGB BLD-MCNC: 10.9 G/DL (ref 14–18)
HGB BLD-MCNC: 11.2 G/DL (ref 14–18)
HGB BLD-MCNC: 13.6 G/DL (ref 14–18)
HGB BLD-MCNC: 14.5 G/DL (ref 14–18)
HGB BLD-MCNC: 15 G/DL (ref 14–18)
HOROWITZ INDEX BLDA+IHG-RTO: 101 MM[HG]
HOROWITZ INDEX BLDA+IHG-RTO: 104 MM[HG]
INR PPP: 1.34 (ref 0.87–1.13)
LACTATE BLD-SCNC: 2.8 MMOL/L (ref 0.5–2)
MAGNESIUM SERPL-MCNC: 3.2 MG/DL (ref 1.5–2.5)
MODE IMODE: ABNORMAL
MODE IMODE: ABNORMAL
O2/TOTAL GAS SETTING VFR VENT: 100 %
O2/TOTAL GAS SETTING VFR VENT: 80 %
PATHOLOGY CONSULT NOTE: NORMAL
PCO2 BLDA: 32.3 MMHG (ref 26–37)
PCO2 BLDA: 35.4 MMHG (ref 26–37)
PCO2 BLDA: 39.8 MMHG (ref 26–37)
PCO2 BLDA: 40.2 MMHG (ref 26–37)
PCO2 BLDA: 43.7 MMHG (ref 26–37)
PCO2 BLDA: 46.3 MMHG (ref 26–37)
PCO2 BLDA: 46.7 MMHG (ref 26–37)
PCO2 BLDV: 38.8 MMHG (ref 41–51)
PCO2 TEMP ADJ BLDA: 31.2 MMHG (ref 26–37)
PCO2 TEMP ADJ BLDA: 34 MMHG (ref 26–37)
PCO2 TEMP ADJ BLDA: 35 MMHG (ref 26–37)
PCO2 TEMP ADJ BLDA: 37.6 MMHG (ref 26–37)
PCO2 TEMP ADJ BLDA: 42.8 MMHG (ref 26–37)
PCO2 TEMP ADJ BLDA: 43.1 MMHG (ref 26–37)
PCO2 TEMP ADJ BLDA: 44.1 MMHG (ref 26–37)
PCO2 TEMP ADJ BLDV: 34.6 MMHG (ref 41–51)
PEEP END EXPIRATORY PRESSURE IPEEP: 8 CMH20
PEEP END EXPIRATORY PRESSURE IPEEP: 8 CMH20
PERCENT MINUTE VOLUME IPMV: 160
PERCENT MINUTE VOLUME IPMV: 160
PH BLDA: 7.29 [PH] (ref 7.4–7.5)
PH BLDA: 7.31 [PH] (ref 7.4–7.5)
PH BLDA: 7.32 [PH] (ref 7.4–7.5)
PH BLDA: 7.37 [PH] (ref 7.4–7.5)
PH BLDA: 7.37 [PH] (ref 7.4–7.5)
PH BLDA: 7.38 [PH] (ref 7.4–7.5)
PH BLDA: 7.38 [PH] (ref 7.4–7.5)
PH BLDV: 7.39 [PH] (ref 7.31–7.45)
PH TEMP ADJ BLDA: 7.31 [PH] (ref 7.4–7.5)
PH TEMP ADJ BLDA: 7.32 [PH] (ref 7.4–7.5)
PH TEMP ADJ BLDA: 7.33 [PH] (ref 7.4–7.5)
PH TEMP ADJ BLDA: 7.38 [PH] (ref 7.4–7.5)
PH TEMP ADJ BLDA: 7.39 [PH] (ref 7.4–7.5)
PH TEMP ADJ BLDA: 7.39 [PH] (ref 7.4–7.5)
PH TEMP ADJ BLDA: 7.42 [PH] (ref 7.4–7.5)
PH TEMP ADJ BLDV: 7.43 [PH] (ref 7.31–7.45)
PLATELET # BLD AUTO: 202 K/UL (ref 164–446)
PO2 BLDA: 104 MMHG (ref 64–87)
PO2 BLDA: 196 MMHG (ref 64–87)
PO2 BLDA: 273 MMHG (ref 64–87)
PO2 BLDA: 306 MMHG (ref 64–87)
PO2 BLDA: 360 MMHG (ref 64–87)
PO2 BLDA: 368 MMHG (ref 64–87)
PO2 BLDA: 81 MMHG (ref 64–87)
PO2 BLDV: 48 MMHG (ref 25–40)
PO2 TEMP ADJ BLDA: 195 MMHG (ref 64–87)
PO2 TEMP ADJ BLDA: 265 MMHG (ref 64–87)
PO2 TEMP ADJ BLDA: 299 MMHG (ref 64–87)
PO2 TEMP ADJ BLDA: 353 MMHG (ref 64–87)
PO2 TEMP ADJ BLDA: 353 MMHG (ref 64–87)
PO2 TEMP ADJ BLDA: 77 MMHG (ref 64–87)
PO2 TEMP ADJ BLDA: 99 MMHG (ref 64–87)
PO2 TEMP ADJ BLDV: 40 MMHG (ref 25–40)
POTASSIUM BLD-SCNC: 3.8 MMOL/L (ref 3.6–5.5)
POTASSIUM BLD-SCNC: 4.1 MMOL/L (ref 3.6–5.5)
POTASSIUM BLD-SCNC: 4.3 MMOL/L (ref 3.6–5.5)
POTASSIUM BLD-SCNC: 4.4 MMOL/L (ref 3.6–5.5)
POTASSIUM BLD-SCNC: 5.1 MMOL/L (ref 3.6–5.5)
POTASSIUM BLD-SCNC: 5.4 MMOL/L (ref 3.6–5.5)
POTASSIUM BLD-SCNC: 5.8 MMOL/L (ref 3.6–5.5)
POTASSIUM SERPL-SCNC: 3.8 MMOL/L (ref 3.6–5.5)
POTASSIUM SERPL-SCNC: 4.2 MMOL/L (ref 3.6–5.5)
PROTHROMBIN TIME: 16.4 SEC (ref 12–14.6)
SAO2 % BLDA: 100 % (ref 93–99)
SAO2 % BLDA: 96 % (ref 93–99)
SAO2 % BLDA: 98 % (ref 93–99)
SAO2 % BLDV: 83 %
SODIUM BLD-SCNC: 134 MMOL/L (ref 135–145)
SODIUM BLD-SCNC: 134 MMOL/L (ref 135–145)
SODIUM BLD-SCNC: 135 MMOL/L (ref 135–145)
SODIUM BLD-SCNC: 136 MMOL/L (ref 135–145)
SODIUM BLD-SCNC: 137 MMOL/L (ref 135–145)
SODIUM BLD-SCNC: 138 MMOL/L (ref 135–145)
SODIUM BLD-SCNC: 139 MMOL/L (ref 135–145)
SPECIMEN DRAWN FROM PATIENT: ABNORMAL

## 2023-08-09 PROCEDURE — 160009 HCHG ANES TIME/MIN: Performed by: THORACIC SURGERY (CARDIOTHORACIC VASCULAR SURGERY)

## 2023-08-09 PROCEDURE — C1898 LEAD, PMKR, OTHER THAN TRANS: HCPCS | Performed by: THORACIC SURGERY (CARDIOTHORACIC VASCULAR SURGERY)

## 2023-08-09 PROCEDURE — C9248 INJ, CLEVIDIPINE BUTYRATE: HCPCS | Performed by: ANESTHESIOLOGY

## 2023-08-09 PROCEDURE — 700105 HCHG RX REV CODE 258: Performed by: ANESTHESIOLOGY

## 2023-08-09 PROCEDURE — C1729 CATH, DRAINAGE: HCPCS | Performed by: THORACIC SURGERY (CARDIOTHORACIC VASCULAR SURGERY)

## 2023-08-09 PROCEDURE — C1768 GRAFT, VASCULAR: HCPCS | Performed by: THORACIC SURGERY (CARDIOTHORACIC VASCULAR SURGERY)

## 2023-08-09 PROCEDURE — 88305 TISSUE EXAM BY PATHOLOGIST: CPT

## 2023-08-09 PROCEDURE — 88311 DECALCIFY TISSUE: CPT

## 2023-08-09 PROCEDURE — 99291 CRITICAL CARE FIRST HOUR: CPT | Performed by: INTERNAL MEDICINE

## 2023-08-09 PROCEDURE — 93010 ELECTROCARDIOGRAM REPORT: CPT | Performed by: INTERNAL MEDICINE

## 2023-08-09 PROCEDURE — 770022 HCHG ROOM/CARE - ICU (200)

## 2023-08-09 PROCEDURE — 82330 ASSAY OF CALCIUM: CPT | Mod: 91

## 2023-08-09 PROCEDURE — 85730 THROMBOPLASTIN TIME PARTIAL: CPT

## 2023-08-09 PROCEDURE — 700105 HCHG RX REV CODE 258: Performed by: NURSE PRACTITIONER

## 2023-08-09 PROCEDURE — A9270 NON-COVERED ITEM OR SERVICE: HCPCS | Performed by: THORACIC SURGERY (CARDIOTHORACIC VASCULAR SURGERY)

## 2023-08-09 PROCEDURE — 700105 HCHG RX REV CODE 258: Mod: JZ | Performed by: THORACIC SURGERY (CARDIOTHORACIC VASCULAR SURGERY)

## 2023-08-09 PROCEDURE — 503001 HCHG PERFUSION: Performed by: THORACIC SURGERY (CARDIOTHORACIC VASCULAR SURGERY)

## 2023-08-09 PROCEDURE — 700101 HCHG RX REV CODE 250

## 2023-08-09 PROCEDURE — 700101 HCHG RX REV CODE 250: Performed by: ANESTHESIOLOGY

## 2023-08-09 PROCEDURE — P9047 ALBUMIN (HUMAN), 25%, 50ML: HCPCS

## 2023-08-09 PROCEDURE — 110372 HCHG SHELL REV 278: Performed by: THORACIC SURGERY (CARDIOTHORACIC VASCULAR SURGERY)

## 2023-08-09 PROCEDURE — 85610 PROTHROMBIN TIME: CPT

## 2023-08-09 PROCEDURE — 85347 COAGULATION TIME ACTIVATED: CPT | Mod: 91

## 2023-08-09 PROCEDURE — 5A1221Z PERFORMANCE OF CARDIAC OUTPUT, CONTINUOUS: ICD-10-PCS | Performed by: THORACIC SURGERY (CARDIOTHORACIC VASCULAR SURGERY)

## 2023-08-09 PROCEDURE — 94799 UNLISTED PULMONARY SVC/PX: CPT

## 2023-08-09 PROCEDURE — 02RF0JZ REPLACEMENT OF AORTIC VALVE WITH SYNTHETIC SUBSTITUTE, OPEN APPROACH: ICD-10-PCS | Performed by: THORACIC SURGERY (CARDIOTHORACIC VASCULAR SURGERY)

## 2023-08-09 PROCEDURE — 160031 HCHG SURGERY MINUTES - 1ST 30 MINS LEVEL 5: Performed by: THORACIC SURGERY (CARDIOTHORACIC VASCULAR SURGERY)

## 2023-08-09 PROCEDURE — 84295 ASSAY OF SERUM SODIUM: CPT | Mod: 91

## 2023-08-09 PROCEDURE — 94150 VITAL CAPACITY TEST: CPT

## 2023-08-09 PROCEDURE — 93005 ELECTROCARDIOGRAM TRACING: CPT | Performed by: NURSE PRACTITIONER

## 2023-08-09 PROCEDURE — 36415 COLL VENOUS BLD VENIPUNCTURE: CPT

## 2023-08-09 PROCEDURE — 93319 3D ECHO IMG CGEN CAR ANOMAL: CPT

## 2023-08-09 PROCEDURE — 83605 ASSAY OF LACTIC ACID: CPT

## 2023-08-09 PROCEDURE — C1751 CATH, INF, PER/CENT/MIDLINE: HCPCS | Performed by: THORACIC SURGERY (CARDIOTHORACIC VASCULAR SURGERY)

## 2023-08-09 PROCEDURE — 83735 ASSAY OF MAGNESIUM: CPT

## 2023-08-09 PROCEDURE — 71045 X-RAY EXAM CHEST 1 VIEW: CPT

## 2023-08-09 PROCEDURE — 85049 AUTOMATED PLATELET COUNT: CPT

## 2023-08-09 PROCEDURE — 700111 HCHG RX REV CODE 636 W/ 250 OVERRIDE (IP): Performed by: ANESTHESIOLOGY

## 2023-08-09 PROCEDURE — 700101 HCHG RX REV CODE 250: Performed by: THORACIC SURGERY (CARDIOTHORACIC VASCULAR SURGERY)

## 2023-08-09 PROCEDURE — 94669 MECHANICAL CHEST WALL OSCILL: CPT

## 2023-08-09 PROCEDURE — 94002 VENT MGMT INPAT INIT DAY: CPT

## 2023-08-09 PROCEDURE — 82962 GLUCOSE BLOOD TEST: CPT

## 2023-08-09 PROCEDURE — 700102 HCHG RX REV CODE 250 W/ 637 OVERRIDE(OP): Performed by: NURSE PRACTITIONER

## 2023-08-09 PROCEDURE — 5A2204Z RESTORATION OF CARDIAC RHYTHM, SINGLE: ICD-10-PCS | Performed by: THORACIC SURGERY (CARDIOTHORACIC VASCULAR SURGERY)

## 2023-08-09 PROCEDURE — 700111 HCHG RX REV CODE 636 W/ 250 OVERRIDE (IP)

## 2023-08-09 PROCEDURE — 700102 HCHG RX REV CODE 250 W/ 637 OVERRIDE(OP): Performed by: ANESTHESIOLOGY

## 2023-08-09 PROCEDURE — 160042 HCHG SURGERY MINUTES - EA ADDL 1 MIN LEVEL 5: Performed by: THORACIC SURGERY (CARDIOTHORACIC VASCULAR SURGERY)

## 2023-08-09 PROCEDURE — A9270 NON-COVERED ITEM OR SERVICE: HCPCS | Performed by: NURSE PRACTITIONER

## 2023-08-09 PROCEDURE — 700105 HCHG RX REV CODE 258: Performed by: THORACIC SURGERY (CARDIOTHORACIC VASCULAR SURGERY)

## 2023-08-09 PROCEDURE — C1894 INTRO/SHEATH, NON-LASER: HCPCS | Performed by: THORACIC SURGERY (CARDIOTHORACIC VASCULAR SURGERY)

## 2023-08-09 PROCEDURE — 84132 ASSAY OF SERUM POTASSIUM: CPT | Mod: 91

## 2023-08-09 PROCEDURE — 700102 HCHG RX REV CODE 250 W/ 637 OVERRIDE(OP): Performed by: THORACIC SURGERY (CARDIOTHORACIC VASCULAR SURGERY)

## 2023-08-09 PROCEDURE — 160048 HCHG OR STATISTICAL LEVEL 1-5: Performed by: THORACIC SURGERY (CARDIOTHORACIC VASCULAR SURGERY)

## 2023-08-09 PROCEDURE — 82803 BLOOD GASES ANY COMBINATION: CPT | Mod: 91

## 2023-08-09 PROCEDURE — 33405 REPLACEMENT AORTIC VALVE OPN: CPT | Performed by: THORACIC SURGERY (CARDIOTHORACIC VASCULAR SURGERY)

## 2023-08-09 PROCEDURE — 85018 HEMOGLOBIN: CPT

## 2023-08-09 PROCEDURE — 700105 HCHG RX REV CODE 258

## 2023-08-09 PROCEDURE — 700111 HCHG RX REV CODE 636 W/ 250 OVERRIDE (IP): Mod: JZ | Performed by: THORACIC SURGERY (CARDIOTHORACIC VASCULAR SURGERY)

## 2023-08-09 PROCEDURE — 700111 HCHG RX REV CODE 636 W/ 250 OVERRIDE (IP): Performed by: THORACIC SURGERY (CARDIOTHORACIC VASCULAR SURGERY)

## 2023-08-09 PROCEDURE — 700111 HCHG RX REV CODE 636 W/ 250 OVERRIDE (IP): Performed by: NURSE PRACTITIONER

## 2023-08-09 PROCEDURE — 85014 HEMATOCRIT: CPT | Mod: 91

## 2023-08-09 DEVICE — VALVE INSPIRIS AORTIC 25MM: Type: IMPLANTABLE DEVICE | Site: HEART | Status: FUNCTIONAL

## 2023-08-09 RX ORDER — OXYCODONE HYDROCHLORIDE 10 MG/1
10 TABLET ORAL
Status: DISCONTINUED | OUTPATIENT
Start: 2023-08-09 | End: 2023-08-13 | Stop reason: HOSPADM

## 2023-08-09 RX ORDER — MIDAZOLAM HYDROCHLORIDE 1 MG/ML
2 INJECTION INTRAMUSCULAR; INTRAVENOUS
Status: DISCONTINUED | OUTPATIENT
Start: 2023-08-09 | End: 2023-08-10

## 2023-08-09 RX ORDER — ACETAMINOPHEN 500 MG
1000 TABLET ORAL ONCE
Status: COMPLETED | OUTPATIENT
Start: 2023-08-09 | End: 2023-08-09

## 2023-08-09 RX ORDER — CEFAZOLIN SODIUM 1 G/3ML
INJECTION, POWDER, FOR SOLUTION INTRAMUSCULAR; INTRAVENOUS PRN
Status: DISCONTINUED | OUTPATIENT
Start: 2023-08-09 | End: 2023-08-09 | Stop reason: SURG

## 2023-08-09 RX ORDER — BISACODYL 10 MG
10 SUPPOSITORY, RECTAL RECTAL
Status: DISCONTINUED | OUTPATIENT
Start: 2023-08-09 | End: 2023-08-13 | Stop reason: HOSPADM

## 2023-08-09 RX ORDER — ONDANSETRON 2 MG/ML
8 INJECTION INTRAMUSCULAR; INTRAVENOUS EVERY 6 HOURS PRN
Status: DISCONTINUED | OUTPATIENT
Start: 2023-08-09 | End: 2023-08-13 | Stop reason: HOSPADM

## 2023-08-09 RX ORDER — MAGNESIUM SULFATE 1 G/100ML
1 INJECTION INTRAVENOUS DAILY
Status: COMPLETED | OUTPATIENT
Start: 2023-08-09 | End: 2023-08-11

## 2023-08-09 RX ORDER — MORPHINE SULFATE 4 MG/ML
4 INJECTION INTRAVENOUS
Status: DISCONTINUED | OUTPATIENT
Start: 2023-08-09 | End: 2023-08-10

## 2023-08-09 RX ORDER — ENOXAPARIN SODIUM 100 MG/ML
40 INJECTION SUBCUTANEOUS DAILY
Status: DISCONTINUED | OUTPATIENT
Start: 2023-08-10 | End: 2023-08-13 | Stop reason: HOSPADM

## 2023-08-09 RX ORDER — SODIUM CHLORIDE 9 MG/ML
INJECTION, SOLUTION INTRAVENOUS
Status: DISCONTINUED | OUTPATIENT
Start: 2023-08-09 | End: 2023-08-09 | Stop reason: SURG

## 2023-08-09 RX ORDER — POTASSIUM CHLORIDE 7.45 MG/ML
10 INJECTION INTRAVENOUS ONCE
Status: COMPLETED | OUTPATIENT
Start: 2023-08-09 | End: 2023-08-09

## 2023-08-09 RX ORDER — NITROGLYCERIN 20 MG/100ML
0-100 INJECTION INTRAVENOUS CONTINUOUS
Status: DISCONTINUED | OUTPATIENT
Start: 2023-08-09 | End: 2023-08-10

## 2023-08-09 RX ORDER — ESMOLOL HYDROCHLORIDE 10 MG/ML
INJECTION INTRAVENOUS PRN
Status: DISCONTINUED | OUTPATIENT
Start: 2023-08-09 | End: 2023-08-09 | Stop reason: SURG

## 2023-08-09 RX ORDER — OXYCODONE HYDROCHLORIDE 5 MG/1
5 TABLET ORAL
Status: DISCONTINUED | OUTPATIENT
Start: 2023-08-09 | End: 2023-08-13 | Stop reason: HOSPADM

## 2023-08-09 RX ORDER — PHENYLEPHRINE HCL IN 0.9% NACL 0.5 MG/5ML
SYRINGE (ML) INTRAVENOUS PRN
Status: DISCONTINUED | OUTPATIENT
Start: 2023-08-09 | End: 2023-08-09 | Stop reason: SURG

## 2023-08-09 RX ORDER — AMOXICILLIN 250 MG
2 CAPSULE ORAL 2 TIMES DAILY
Status: DISCONTINUED | OUTPATIENT
Start: 2023-08-09 | End: 2023-08-13 | Stop reason: HOSPADM

## 2023-08-09 RX ORDER — EPHEDRINE SULFATE 50 MG/ML
INJECTION, SOLUTION INTRAVENOUS PRN
Status: DISCONTINUED | OUTPATIENT
Start: 2023-08-09 | End: 2023-08-09 | Stop reason: SURG

## 2023-08-09 RX ORDER — DEXAMETHASONE SODIUM PHOSPHATE 4 MG/ML
INJECTION, SOLUTION INTRA-ARTICULAR; INTRALESIONAL; INTRAMUSCULAR; INTRAVENOUS; SOFT TISSUE PRN
Status: DISCONTINUED | OUTPATIENT
Start: 2023-08-09 | End: 2023-08-09 | Stop reason: SURG

## 2023-08-09 RX ORDER — DIPHENHYDRAMINE HCL 25 MG
25 TABLET ORAL
Status: DISCONTINUED | OUTPATIENT
Start: 2023-08-09 | End: 2023-08-13 | Stop reason: HOSPADM

## 2023-08-09 RX ORDER — ROCURONIUM BROMIDE 10 MG/ML
INJECTION, SOLUTION INTRAVENOUS PRN
Status: DISCONTINUED | OUTPATIENT
Start: 2023-08-09 | End: 2023-08-09 | Stop reason: SURG

## 2023-08-09 RX ORDER — PROCHLORPERAZINE EDISYLATE 5 MG/ML
10 INJECTION INTRAMUSCULAR; INTRAVENOUS EVERY 6 HOURS PRN
Status: DISCONTINUED | OUTPATIENT
Start: 2023-08-09 | End: 2023-08-13 | Stop reason: HOSPADM

## 2023-08-09 RX ORDER — SODIUM CHLORIDE 9 MG/ML
INJECTION, SOLUTION INTRAVENOUS CONTINUOUS
Status: DISCONTINUED | OUTPATIENT
Start: 2023-08-09 | End: 2023-08-10

## 2023-08-09 RX ORDER — SODIUM CHLORIDE, SODIUM LACTATE, POTASSIUM CHLORIDE, CALCIUM CHLORIDE 600; 310; 30; 20 MG/100ML; MG/100ML; MG/100ML; MG/100ML
INJECTION, SOLUTION INTRAVENOUS
Status: DISCONTINUED | OUTPATIENT
Start: 2023-08-09 | End: 2023-08-09 | Stop reason: SURG

## 2023-08-09 RX ORDER — MIDAZOLAM HYDROCHLORIDE 1 MG/ML
INJECTION INTRAMUSCULAR; INTRAVENOUS PRN
Status: DISCONTINUED | OUTPATIENT
Start: 2023-08-09 | End: 2023-08-09 | Stop reason: SURG

## 2023-08-09 RX ORDER — SODIUM CHLORIDE, SODIUM GLUCONATE, SODIUM ACETATE, POTASSIUM CHLORIDE AND MAGNESIUM CHLORIDE 526; 502; 368; 37; 30 MG/100ML; MG/100ML; MG/100ML; MG/100ML; MG/100ML
INJECTION, SOLUTION INTRAVENOUS
Status: DISCONTINUED | OUTPATIENT
Start: 2023-08-09 | End: 2023-08-09 | Stop reason: SURG

## 2023-08-09 RX ORDER — VANCOMYCIN HYDROCHLORIDE 1 G/20ML
INJECTION, POWDER, LYOPHILIZED, FOR SOLUTION INTRAVENOUS
Status: COMPLETED | OUTPATIENT
Start: 2023-08-09 | End: 2023-08-09

## 2023-08-09 RX ORDER — TRAMADOL HYDROCHLORIDE 50 MG/1
50 TABLET ORAL EVERY 4 HOURS PRN
Status: DISCONTINUED | OUTPATIENT
Start: 2023-08-09 | End: 2023-08-13 | Stop reason: HOSPADM

## 2023-08-09 RX ORDER — ALUMINA, MAGNESIA, AND SIMETHICONE 2400; 2400; 240 MG/30ML; MG/30ML; MG/30ML
30 SUSPENSION ORAL EVERY 4 HOURS PRN
Status: DISCONTINUED | OUTPATIENT
Start: 2023-08-09 | End: 2023-08-13 | Stop reason: HOSPADM

## 2023-08-09 RX ORDER — OMEPRAZOLE 20 MG/1
20 CAPSULE, DELAYED RELEASE ORAL DAILY
Status: DISCONTINUED | OUTPATIENT
Start: 2023-08-10 | End: 2023-08-13 | Stop reason: HOSPADM

## 2023-08-09 RX ORDER — POLYETHYLENE GLYCOL 3350 17 G/17G
1 POWDER, FOR SOLUTION ORAL DAILY
Status: DISCONTINUED | OUTPATIENT
Start: 2023-08-10 | End: 2023-08-13 | Stop reason: HOSPADM

## 2023-08-09 RX ORDER — DEXTROSE MONOHYDRATE 25 G/50ML
12.5-25 INJECTION, SOLUTION INTRAVENOUS PRN
Status: DISCONTINUED | OUTPATIENT
Start: 2023-08-09 | End: 2023-08-10

## 2023-08-09 RX ORDER — DEXMEDETOMIDINE HYDROCHLORIDE 4 UG/ML
0-1.5 INJECTION, SOLUTION INTRAVENOUS CONTINUOUS
Status: DISCONTINUED | OUTPATIENT
Start: 2023-08-09 | End: 2023-08-10

## 2023-08-09 RX ORDER — ACETAMINOPHEN 500 MG
1000 TABLET ORAL EVERY 6 HOURS PRN
Status: DISCONTINUED | OUTPATIENT
Start: 2023-08-14 | End: 2023-08-13 | Stop reason: HOSPADM

## 2023-08-09 RX ORDER — SODIUM CHLORIDE 9 MG/ML
INJECTION, SOLUTION INTRAVENOUS CONTINUOUS
Status: DISCONTINUED | OUTPATIENT
Start: 2023-08-09 | End: 2023-08-11

## 2023-08-09 RX ORDER — LIDOCAINE HYDROCHLORIDE 20 MG/ML
INJECTION, SOLUTION EPIDURAL; INFILTRATION; INTRACAUDAL; PERINEURAL PRN
Status: DISCONTINUED | OUTPATIENT
Start: 2023-08-09 | End: 2023-08-09 | Stop reason: SURG

## 2023-08-09 RX ORDER — INSULIN LISPRO 100 [IU]/ML
0-14 INJECTION, SOLUTION INTRAVENOUS; SUBCUTANEOUS
Status: ACTIVE | OUTPATIENT
Start: 2023-08-09 | End: 2023-08-10

## 2023-08-09 RX ORDER — SODIUM CHLORIDE, SODIUM GLUCONATE, SODIUM ACETATE, POTASSIUM CHLORIDE AND MAGNESIUM CHLORIDE 526; 502; 368; 37; 30 MG/100ML; MG/100ML; MG/100ML; MG/100ML; MG/100ML
INJECTION, SOLUTION INTRAVENOUS PRN
Status: DISCONTINUED | OUTPATIENT
Start: 2023-08-09 | End: 2023-08-10

## 2023-08-09 RX ORDER — ASPIRIN 81 MG/1
81 TABLET ORAL DAILY
Status: DISCONTINUED | OUTPATIENT
Start: 2023-08-10 | End: 2023-08-13 | Stop reason: HOSPADM

## 2023-08-09 RX ORDER — NOREPINEPHRINE BITARTRATE 0.03 MG/ML
0-1 INJECTION, SOLUTION INTRAVENOUS CONTINUOUS
Status: DISCONTINUED | OUTPATIENT
Start: 2023-08-09 | End: 2023-08-10

## 2023-08-09 RX ORDER — HEPARIN SODIUM,PORCINE 1000/ML
VIAL (ML) INJECTION PRN
Status: DISCONTINUED | OUTPATIENT
Start: 2023-08-09 | End: 2023-08-09 | Stop reason: SURG

## 2023-08-09 RX ORDER — ACETAMINOPHEN 500 MG
1000 TABLET ORAL EVERY 6 HOURS
Status: DISCONTINUED | OUTPATIENT
Start: 2023-08-09 | End: 2023-08-13 | Stop reason: HOSPADM

## 2023-08-09 RX ADMIN — SODIUM CHLORIDE, SODIUM GLUCONATE, SODIUM ACETATE, POTASSIUM CHLORIDE AND MAGNESIUM CHLORIDE: 526; 502; 368; 37; 30 INJECTION, SOLUTION INTRAVENOUS at 15:26

## 2023-08-09 RX ADMIN — METHADONE HYDROCHLORIDE 10 MG: 10 INJECTION, SOLUTION INTRAMUSCULAR; INTRAVENOUS; SUBCUTANEOUS at 08:05

## 2023-08-09 RX ADMIN — ROCURONIUM BROMIDE 100 MG: 50 INJECTION, SOLUTION INTRAVENOUS at 08:05

## 2023-08-09 RX ADMIN — MIDAZOLAM 2 MG: 1 INJECTION, SOLUTION INTRAMUSCULAR; INTRAVENOUS at 08:05

## 2023-08-09 RX ADMIN — LIDOCAINE HYDROCHLORIDE 100 MG: 20 INJECTION, SOLUTION EPIDURAL; INFILTRATION; INTRACAUDAL at 08:05

## 2023-08-09 RX ADMIN — CEFAZOLIN 2 G: 1 INJECTION, POWDER, FOR SOLUTION INTRAMUSCULAR; INTRAVENOUS at 08:09

## 2023-08-09 RX ADMIN — ONDANSETRON 8 MG: 2 INJECTION INTRAMUSCULAR; INTRAVENOUS at 18:34

## 2023-08-09 RX ADMIN — ROCURONIUM BROMIDE 50 MG: 50 INJECTION, SOLUTION INTRAVENOUS at 11:25

## 2023-08-09 RX ADMIN — DEXMEDETOMIDINE HYDROCHLORIDE 0.5 MCG/KG/HR: 100 INJECTION, SOLUTION INTRAVENOUS at 08:52

## 2023-08-09 RX ADMIN — SODIUM CHLORIDE, SODIUM GLUCONATE, SODIUM ACETATE, POTASSIUM CHLORIDE AND MAGNESIUM CHLORIDE: 526; 502; 368; 37; 30 INJECTION, SOLUTION INTRAVENOUS at 08:18

## 2023-08-09 RX ADMIN — ACETAMINOPHEN 1000 MG: 500 TABLET, FILM COATED ORAL at 07:51

## 2023-08-09 RX ADMIN — SODIUM CHLORIDE, SODIUM GLUCONATE, SODIUM ACETATE, POTASSIUM CHLORIDE AND MAGNESIUM CHLORIDE: 526; 502; 368; 37; 30 INJECTION, SOLUTION INTRAVENOUS at 11:28

## 2023-08-09 RX ADMIN — ESMOLOL HYDROCHLORIDE 40 MG: 100 INJECTION, SOLUTION INTRAVENOUS at 09:23

## 2023-08-09 RX ADMIN — HEPARIN SODIUM 40000 UNITS: 1000 INJECTION, SOLUTION INTRAVENOUS; SUBCUTANEOUS at 08:59

## 2023-08-09 RX ADMIN — SENNOSIDES AND DOCUSATE SODIUM 2 TABLET: 50; 8.6 TABLET ORAL at 18:12

## 2023-08-09 RX ADMIN — METHADONE HYDROCHLORIDE 10 MG: 10 INJECTION, SOLUTION INTRAMUSCULAR; INTRAVENOUS; SUBCUTANEOUS at 08:52

## 2023-08-09 RX ADMIN — AMINOCAPROIC ACID 9780 MG: 250 INJECTION, SOLUTION INTRAVENOUS at 08:52

## 2023-08-09 RX ADMIN — POTASSIUM CHLORIDE 10 MEQ: 7.46 INJECTION, SOLUTION INTRAVENOUS at 19:46

## 2023-08-09 RX ADMIN — ESMOLOL HYDROCHLORIDE 20 MG: 100 INJECTION, SOLUTION INTRAVENOUS at 09:19

## 2023-08-09 RX ADMIN — PROPOFOL 200 MG: 10 INJECTION, EMULSION INTRAVENOUS at 08:05

## 2023-08-09 RX ADMIN — SODIUM CHLORIDE, SODIUM GLUCONATE, SODIUM ACETATE, POTASSIUM CHLORIDE AND MAGNESIUM CHLORIDE: 526; 502; 368; 37; 30 INJECTION, SOLUTION INTRAVENOUS at 13:51

## 2023-08-09 RX ADMIN — SODIUM CHLORIDE 2 UNITS/HR: 9 INJECTION, SOLUTION INTRAVENOUS at 13:48

## 2023-08-09 RX ADMIN — CLEVIPIDINE 2 MG/HR: 0.5 EMULSION INTRAVENOUS at 08:57

## 2023-08-09 RX ADMIN — Medication 1 APPLICATOR: at 22:59

## 2023-08-09 RX ADMIN — AMINOCAPROIC ACID 2 G/HR: 250 INJECTION, SOLUTION INTRAVENOUS at 09:25

## 2023-08-09 RX ADMIN — Medication 1 APPLICATOR: at 13:24

## 2023-08-09 RX ADMIN — SODIUM CHLORIDE: 9 INJECTION, SOLUTION INTRAVENOUS at 13:07

## 2023-08-09 RX ADMIN — INSULIN HUMAN 3 UNITS: 100 INJECTION, SOLUTION PARENTERAL at 09:57

## 2023-08-09 RX ADMIN — CEFAZOLIN 2 G: 2 INJECTION, POWDER, FOR SOLUTION INTRAMUSCULAR; INTRAVENOUS at 16:23

## 2023-08-09 RX ADMIN — MAGNESIUM SULFATE 1 G: 1 INJECTION INTRAVENOUS at 13:09

## 2023-08-09 RX ADMIN — EPHEDRINE SULFATE 10 MG: 50 INJECTION, SOLUTION INTRAVENOUS at 09:02

## 2023-08-09 RX ADMIN — EPHEDRINE SULFATE 10 MG: 50 INJECTION, SOLUTION INTRAVENOUS at 09:05

## 2023-08-09 RX ADMIN — Medication 100 MCG: at 09:03

## 2023-08-09 RX ADMIN — ACETAMINOPHEN 1000 MG: 500 TABLET, FILM COATED ORAL at 18:11

## 2023-08-09 RX ADMIN — ROCURONIUM BROMIDE 50 MG: 50 INJECTION, SOLUTION INTRAVENOUS at 08:50

## 2023-08-09 RX ADMIN — INSULIN HUMAN 2 UNITS: 100 INJECTION, SOLUTION PARENTERAL at 10:44

## 2023-08-09 RX ADMIN — SODIUM CHLORIDE: 9 INJECTION, SOLUTION INTRAVENOUS at 13:04

## 2023-08-09 RX ADMIN — Medication 100 MCG: at 09:01

## 2023-08-09 RX ADMIN — SODIUM CHLORIDE, POTASSIUM CHLORIDE, SODIUM LACTATE AND CALCIUM CHLORIDE: 600; 310; 30; 20 INJECTION, SOLUTION INTRAVENOUS at 08:00

## 2023-08-09 RX ADMIN — SODIUM CHLORIDE: 9 INJECTION, SOLUTION INTRAVENOUS at 08:18

## 2023-08-09 RX ADMIN — METOPROLOL TARTRATE 12.5 MG: 25 TABLET, FILM COATED ORAL at 07:51

## 2023-08-09 RX ADMIN — POTASSIUM CHLORIDE 10 MEQ: 7.46 INJECTION, SOLUTION INTRAVENOUS at 14:03

## 2023-08-09 RX ADMIN — ESMOLOL HYDROCHLORIDE 10 MG: 100 INJECTION, SOLUTION INTRAVENOUS at 09:21

## 2023-08-09 RX ADMIN — DEXAMETHASONE SODIUM PHOSPHATE 8 MG: 4 INJECTION INTRA-ARTICULAR; INTRALESIONAL; INTRAMUSCULAR; INTRAVENOUS; SOFT TISSUE at 08:09

## 2023-08-09 RX ADMIN — ROCURONIUM BROMIDE 50 MG: 50 INJECTION, SOLUTION INTRAVENOUS at 09:24

## 2023-08-09 ASSESSMENT — COGNITIVE AND FUNCTIONAL STATUS - GENERAL
DRESSING REGULAR LOWER BODY CLOTHING: A LITTLE
MOBILITY SCORE: 18
TOILETING: A LITTLE
PERSONAL GROOMING: A LITTLE
SUGGESTED CMS G CODE MODIFIER MOBILITY: CK
CLIMB 3 TO 5 STEPS WITH RAILING: A LITTLE
HELP NEEDED FOR BATHING: A LITTLE
MOVING TO AND FROM BED TO CHAIR: A LITTLE
WALKING IN HOSPITAL ROOM: A LITTLE
MOVING FROM LYING ON BACK TO SITTING ON SIDE OF FLAT BED: A LITTLE
EATING MEALS: A LITTLE
DRESSING REGULAR UPPER BODY CLOTHING: A LOT
DAILY ACTIVITIY SCORE: 17
STANDING UP FROM CHAIR USING ARMS: A LITTLE
TURNING FROM BACK TO SIDE WHILE IN FLAT BAD: A LITTLE
SUGGESTED CMS G CODE MODIFIER DAILY ACTIVITY: CK

## 2023-08-09 ASSESSMENT — COPD QUESTIONNAIRES
DO YOU EVER COUGH UP ANY MUCUS OR PHLEGM?: NO/ONLY WITH OCCASIONAL COLDS OR INFECTIONS
HAVE YOU SMOKED AT LEAST 100 CIGARETTES IN YOUR ENTIRE LIFE: YES
COPD SCREENING SCORE: 3
DURING THE PAST 4 WEEKS HOW MUCH DID YOU FEEL SHORT OF BREATH: NONE/LITTLE OF THE TIME

## 2023-08-09 ASSESSMENT — PAIN SCALES - GENERAL: PAIN_LEVEL: 0

## 2023-08-09 ASSESSMENT — PAIN DESCRIPTION - PAIN TYPE
TYPE: SURGICAL PAIN

## 2023-08-09 ASSESSMENT — FIBROSIS 4 INDEX: FIB4 SCORE: 0.51

## 2023-08-09 ASSESSMENT — PULMONARY FUNCTION TESTS: FVC: 1

## 2023-08-09 NOTE — H&P
Pre op labs and imaging reviewed. No interval changes to H&P. Proceed with aortic valve replacement, and RISHABH.

## 2023-08-09 NOTE — ANESTHESIA POSTPROCEDURE EVALUATION
Patient: Jassi Rooney    Procedure Summary     Date: 08/09/23 Room / Location: [facility] 03 / SURGERY [facility]    Anesthesia Start: 0800 Anesthesia Stop: 1235    Procedures:       AORTIC VALVE REPLACEMENT, (Chest)      ECHOCARDIOGRAM, TRANSESOPHAGEAL, INTRAOPERATIVE (Esophagus) Diagnosis: (AORTIC STENOSIS)    Surgeons: Chris George D.O. Responsible Provider: Tobi Yañez M.D.    Anesthesia Type: general ASA Status: 4          Final Anesthesia Type: general  Last vitals  BP   Blood Pressure: (!) 167/115 (RN notified.)    Temp   36.3 °C (97.4 °F)    Pulse   70   Resp   16    SpO2   97 %      Anesthesia Post Evaluation    Patient location during evaluation: ICU  Patient participation: waiting for patient participation  Level of consciousness: obtunded/minimal responses  Pain score: 0    Airway patency: patent  Anesthetic complications: no  Cardiovascular status: adequate  Respiratory status: acceptable, ETT and ventilator  Hydration status: stable  Patient has been marked as needing Post Surgery Rounding  PONV: none          No notable events documented.     Nurse Pain Score: 0 (NPRS)

## 2023-08-09 NOTE — DIETARY
Nutrition Services: Day 0 of admit.  Jassi Rooney is a 53 y.o. male with admitting DX of Severe aortic stenosis.    Consult received for Cardiac Rehab Diet Education.  S/p AORTIC VALVE REPLACEMENT.  HA1c WNL; no lipid panel.  No indication for specific cardiac diet education for the above Dx/procedure.

## 2023-08-09 NOTE — ANESTHESIA PREPROCEDURE EVALUATION
Case: [id number] Date/Time: 08/09/23 0745    Procedures:       AORTIC VALVE REPLACEMENT, TRANSESOPHAGEAL ECHOCARDIOGRAM      ECHOCARDIOGRAM, TRANSESOPHAGEAL, INTRAOPERATIVE    Pre-op diagnosis: AORTIC STENOSIS    Location: [place] [address] / SURGERY [facility]    Surgeons: Chris George D.O.          Relevant Problems   CARDIAC   (positive) Primary hypertension   (positive) Severe aortic stenosis      Other   (positive) HLD (hyperlipidemia)   (positive) Impaired fasting glucose       Physical Exam    Airway   Mallampati: II  TM distance: >3 FB  Neck ROM: full       Cardiovascular   Rhythm: regular  Rate: normal  (+) murmur     Dental - normal exam  (+) upper dentures, lower dentures           Pulmonary   Breath sounds clear to auscultation     Abdominal   (+) obese     Neurological - normal exam                 Anesthesia Plan    ASA 4 (severe AS)       Plan - general       Airway plan will be ETT  RISHABH Planned        Induction: intravenous    Postoperative Plan: Postoperative administration of opioids is intended.    Pertinent diagnostic labs and testing reviewed    Informed Consent:    Anesthetic plan and risks discussed with patient.    Use of blood products discussed with: patient whom consented to blood products.

## 2023-08-09 NOTE — PROGRESS NOTES
Patient brought up from the OR with the OR team. Dr. Yañez gave report to this RN and Dr. Austin. Chest X ray reviewed by Dr. Austin.     Patient awoken very briefly approximately 30 minutes after arrival to the floor, but he did not follow commands. Precedex gtt has been stopped per order until patient awakens and moves all extremities.

## 2023-08-09 NOTE — CONSULTS
Critical Care Consultation    Date of consult: 8/9/2023    Referring Physician  Chris George D.O.    Reason for Consultation  Post cardiac surgery care    History of Presenting Illness  53 y.o. male who presented 8/9/2023 with hx of hypertension, chol and bicuspid severe symptomatic AS. He today underwent elective AVR with Dr George and was uneventful course. He was an easy intubation had left radial marilu, left subclavian lines placed. He was given 1.5L of fluids, cell saver of 500ml, made 850ml of urine and was on clevidipine during case. His pre and post bypass EF was preserved with 60%. He did required 3 shock for Vfib coming off pump. He should be a early extubation candidate.     Code Status  Full Code    Review of Systems  Review of Systems   Unable to perform ROS: Intubated       Past Medical History   has a past medical history of Arrhythmia (07/24/2023), Breath shortness (07/24/2023), Dental disorder (07/24/2023), Heart burn (07/24/2023), Heart murmur (07/24/2023), Heart valve disease (07/24/2023), High cholesterol (07/24/2023), Hypertension, and Indigestion.    Surgical History   has a past surgical history that includes colonoscopy; angiogram; aortic valve replacement (8/9/2023); and echocardiogram, transesophageal, intraoperative (8/9/2023).    Family History  family history is not on file.    Social History   reports that he quit smoking about 19 months ago. His smoking use included cigars. He has never been exposed to tobacco smoke. He has never used smokeless tobacco. He reports current alcohol use of about 1.2 oz of alcohol per week. He reports that he does not use drugs.    Medications  Home Medications       Reviewed by Geovanna Mcdaniel R.N. (Registered Nurse) on 08/09/23 at 0707  Med List Status: <None>     Medication Last Dose Status   acetaminophen (Tylenol) tablet 1,000 mg  Active   aminocaproic acid (Amicar) 5 g in  mL Infusion  Active   aminocaproic acid (Amicar) 9.78 g in   mL IV Bolus  Active   Ascorbic Acid (VITAMIN C) 1000 MG Tab  Active   atorvastatin (LIPITOR) 20 MG Tab  Active   Calcium Carb-Cholecalciferol (CALCIUM 600+D) 600-20 MG-MCG Tab  Active   Cardiac Surgery Prophylactic Antibiotics per Pharmacy  Active   dexmedetomidine (PRECEDEX) 400 mcg/100mL NS premix infusion  Active   EPINEPHrine (Adrenalin) infusion 4 mg/250 mL (premix)  Active   Flaxseed, Linseed, (FLAX SEED OIL) 1000 MG Cap  Active   Garlic 1000 MG Cap  Active   insulin regular (Humulin R) 100 Units in  mL Infusion  Active   lidocaine (Xylocaine) 1 % injection 0.5 mL  Active   lisinopril (PRINIVIL) 20 MG Tab  Active   Magnesium 250 MG Tab  Active   methadone 10 mg/mL (Dolophine) injection 20 mg  Active   metoprolol tartrate (Lopressor) tablet 12.5 mg  Active   Multiple Vitamins-Minerals (CENTRUM SILVER 50+MEN PO)  Active   norepinephrine (Levophed) 8 mg in 250 mL NS infusion (premix)  Active   Omega-3 1000 MG Cap  Active   Turmeric (QC TUMERIC COMPLEX) 500 MG Cap  Active   vitamin E 1000 units (450 mg) Cap  Active                  Current Facility-Administered Medications   Medication Dose Route Frequency Provider Last Rate Last Admin    Respiratory Therapy Consult   Nebulization Continuous RT JUDY CampuzanoP.R.N.        NS infusion   Intravenous Continuous JUDY CampuzanoP.R.N. 10 mL/hr at 08/09/23 1307 New Bag at 08/09/23 1307    NS infusion   Intravenous Continuous ENA Campuzano.P.R.N. 30 mL/hr at 08/09/23 1304 New Bag at 08/09/23 1304    electrolyte-A (Plasmalyte-A) infusion   Intravenous PRN ENA Campuzano.P.R.N.        [START ON 8/10/2023] enoxaparin (Lovenox) inj 40 mg  40 mg Subcutaneous DAILY AT 1800 ENA Campuzano.P.R.N.        ceFAZolin (Ancef) 2 g in  mL IVPB  2 g Intravenous Once JUDY CampuzanoP.R.UGO.        Nozin nasal  swab  1 Applicator Each Nostril BID JUDY CampuzanoP.R.N.   1 Applicator at 08/09/23 1324    calcium CHLORIDE 1,000  mg in  mL IVPB  1,000 mg Intravenous Once PRN ENA Campuzano.P.R.N.        magnesium sulfate in D5W IVPB premix 1 g  1 g Intravenous DAILY ENA Campuzano.P.R.N. 100 mL/hr at 08/09/23 1309 1 g at 08/09/23 1309    K+ Scale: Goal of 4.5  1 Each Intravenous Q6HRS ENA Campuzano.P.R.N.   1 Each at 08/09/23 1239    [START ON 8/10/2023] aspirin EC tablet 81 mg  81 mg Oral DAILY ENA Campuzano.P.R.N.        clevidipine (Cleviprex) IV emulsion  0-21 mg/hr Intravenous Continuous JUDY CampuzanoP.R.N.   Dose not Required at 08/09/23 1253    nitroglycerin 50 mg in D5W 250 ml infusion  0-100 mcg/min Intravenous Continuous JUDY CampuzanoP.R.N.   Dose not Required at 08/09/23 1300    acetaminophen (Tylenol) tablet 1,000 mg  1,000 mg Oral Q6HRS Jazzy Brooks A.P.R.N.        Followed by    [START ON 8/14/2023] acetaminophen (Tylenol) tablet 1,000 mg  1,000 mg Oral Q6HRS PRN ENA Campuzano.P.R.N.        oxyCODONE immediate-release (Roxicodone) tablet 5 mg  5 mg Oral Q3HRS PRN ENA Campuzano.P.R.N.        Or    oxyCODONE immediate release (Roxicodone) tablet 10 mg  10 mg Oral Q3HRS PRN Jazzy Brooks A.P.R.N.        Or    fentaNYL (Sublimaze) injection 50 mcg  50 mcg Intravenous Q3HRS PRN Jazzy Brooks A.P.R.N.        traMADol (Ultram) 50 MG tablet 50 mg  50 mg Oral Q4HRS PRN ENA Campuzano.P.R.N.        midazolam (Versed) injection 2 mg  2 mg Intravenous Q HOUR PRN ENA Campuzano.P.R.N.        dexmedetomidine (PRECEDEX) 400 mcg/100mL NS premix infusion  0-1.5 mcg/kg/hr (Ideal) Intravenous Continuous JUDY CampuzanoP.R.NSerena 9.4 mL/hr at 08/09/23 1259 0.5 mcg/kg/hr at 08/09/23 1259    sodium bicarbonate 8.4 % injection 50 mEq  50 mEq Intravenous Q HOUR PRN JUDY CampuzanoP.RSerenaNSerena        morphine 4 MG/ML injection 4 mg  4 mg Intravenous Q HOUR PRN JUDY CampuzanoPSerenaRSerenaNSerena        ondansetron (Zofran) syringe/vial injection 8 mg  8 mg Intravenous Q6HRS PRN Jazzy  ENA Brewer.P.R.N.        Or    prochlorperazine (Compazine) injection 10 mg  10 mg Intravenous Q6HRS PRN JUDY CampuzanoPSerenaRYOLANDE.        senna-docusate (Pericolace Or Senokot S) 8.6-50 MG per tablet 2 Tablet  2 Tablet Oral BID JUDY CampuzanoPSerenaR.UGO.        And    [START ON 8/10/2023] polyethylene glycol/lytes (Miralax) PACKET 1 Packet  1 Packet Oral DAILY ENA Campuzano.P.R.UGO.        And    [START ON 8/11/2023] magnesium hydroxide (Milk Of Magnesia) suspension 30 mL  30 mL Oral DAILY JUDY CampuzanoP.R.NOE        And    bisacodyl (Dulcolax) suppository 10 mg  10 mg Rectal QDAY PRN JUDY CampuzanoP.R.N.        [START ON 8/10/2023] omeprazole (PriLOSEC) capsule 20 mg  20 mg Oral DAILY ENA Campuzano.P.RYOLANDE.        mag hydrox-al hydrox-simeth (Maalox Plus Es Or Mylanta Ds) suspension 30 mL  30 mL Oral Q4HRS PRN JUDY CampuzanoPSerenaR.N.        diphenhydrAMINE (Benadryl) tablet/capsule 25 mg  25 mg Oral HS PRN - MR X 1 JUDY CampuzanoPSerenaR.N.        [START ON 8/10/2023] metoprolol tartrate (Lopressor) tablet 12.5 mg  12.5 mg Oral BID ENA Campuzano.P.R.N.        Followed by    [START ON 8/11/2023] metoprolol tartrate (Lopressor) tablet 25 mg  25 mg Oral BID ENA Campuzano.P.R.UGO.        norepinephrine (Levophed) 8 mg in 250 mL NS infusion (premix)  0-1 mcg/kg/min (Ideal) Intravenous Continuous UJDY CampuzanoPSerenaRYOLANDE.        potassium chloride (Kcl) ivpb 10 mEq  10 mEq Intravenous Once JUDY CampuzanoPSerenaRYOLANDE.        insulin regular (HumuLIN R,NovoLIN R) injection  0-14 Units Intravenous Once CASS Campuzano        insulin lispro (HumaLOG,AdmeLOG) injection  0-14 Units Subcutaneous TID AC CASS Campuzano        insulin regular (Humulin R) 100 Units in  mL Infusion  0-29 Units/hr Intravenous Continuous CASS Campuzano 2 mL/hr at 08/09/23 1348 2 Units/hr at 08/09/23 1348    dextrose 50% (D50W) injection 12.5-25 g  12.5-25 g Intravenous PRN  CASS Campuzano MD Alert...Pharmacy to initiate transition from insulin infusion to subcutaneous insulin for cardiothoracic surgery 1 Each  1 Each Other Continuous CASS Campuzano           Allergies  No Known Allergies    Vital Signs last 24 hours  Temp:  [36.3 °C (97.4 °F)] 36.3 °C (97.4 °F)  Pulse:  [70-74] 73  Resp:  [16-20] 20  BP: (164-167)/(107-115) 167/115  SpO2:  [95 %-97 %] 95 %    Physical Exam  Physical Exam  Vitals and nursing note reviewed.   Constitutional:       Comments: Post operatively from cardiac surgery   HENT:      Head: Normocephalic.      Mouth/Throat:      Mouth: Mucous membranes are moist.      Comments: ET in place  Eyes:      Pupils: Pupils are equal, round, and reactive to light.   Cardiovascular:      Rate and Rhythm: Normal rate.      Heart sounds: No murmur heard.  Pulmonary:      Effort: No respiratory distress.      Breath sounds: No stridor. No wheezing or rhonchi.      Comments: Bandages clean with CT and wire, no significant CT output, left chest subclavian line  Abdominal:      General: There is no distension.      Palpations: There is no mass.      Tenderness: There is no abdominal tenderness.      Hernia: No hernia is present.   Musculoskeletal:         General: No swelling or tenderness.   Skin:     Coloration: Skin is pale. Skin is not jaundiced.   Neurological:      Comments: Heavily sedated post operatively   Psychiatric:      Comments: Unable to determine         Fluids    Intake/Output Summary (Last 24 hours) at 8/9/2023 1348  Last data filed at 8/9/2023 1234  Gross per 24 hour   Intake 2726.56 ml   Output 850 ml   Net 1876.56 ml       Laboratory  Recent Results (from the past 48 hour(s))   URINALYSIS    Collection Time: 08/08/23 11:04 AM    Specimen: Urine   Result Value Ref Range    Color Yellow     Character Clear     Specific Gravity 1.026 <1.035    Ph 5.5 5.0 - 8.0    Glucose Negative Negative mg/dL    Ketones Negative Negative mg/dL     Protein Negative Negative mg/dL    Bilirubin Negative Negative    Urobilinogen, Urine 0.2 Negative    Nitrite Negative Negative    Leukocyte Esterase Negative Negative    Occult Blood Negative Negative    Micro Urine Req see below    Urine Drug Screen    Collection Time: 08/08/23 11:04 AM   Result Value Ref Range    Amphetamines Urine Negative Negative    Barbiturates Negative Negative    Benzodiazepines Negative Negative    Cocaine Metabolite Negative Negative    Fentanyl, Urine Negative Negative    Methadone Negative Negative    Opiates Negative Negative    Oxycodone Negative Negative    Phencyclidine -Pcp Negative Negative    Propoxyphene Negative Negative    Cannabinoid Metab Negative Negative   S. Aureus By PCR, Nasal Complete    Collection Time: 08/08/23 11:04 AM    Specimen: Respirate   Result Value Ref Range    Staph aureus by PCR Negative Negative    MRSA by PCR Negative Negative   CBC WITH DIFFERENTIAL    Collection Time: 08/08/23 11:07 AM   Result Value Ref Range    WBC 8.0 4.8 - 10.8 K/uL    RBC 5.42 4.70 - 6.10 M/uL    Hemoglobin 16.0 14.0 - 18.0 g/dL    Hematocrit 45.9 42.0 - 52.0 %    MCV 84.7 81.4 - 97.8 fL    MCH 29.5 27.0 - 33.0 pg    MCHC 34.9 32.3 - 36.5 g/dL    RDW 40.2 35.9 - 50.0 fL    Platelet Count 265 164 - 446 K/uL    MPV 9.7 9.0 - 12.9 fL    Neutrophils-Polys 56.30 44.00 - 72.00 %    Lymphocytes 29.70 22.00 - 41.00 %    Monocytes 9.60 0.00 - 13.40 %    Eosinophils 3.40 0.00 - 6.90 %    Basophils 0.50 0.00 - 1.80 %    Immature Granulocytes 0.50 0.00 - 0.90 %    Nucleated RBC 0.00 0.00 - 0.20 /100 WBC    Neutrophils (Absolute) 4.52 1.82 - 7.42 K/uL    Lymphs (Absolute) 2.38 1.00 - 4.80 K/uL    Monos (Absolute) 0.77 0.00 - 0.85 K/uL    Eos (Absolute) 0.27 0.00 - 0.51 K/uL    Baso (Absolute) 0.04 0.00 - 0.12 K/uL    Immature Granulocytes (abs) 0.04 0.00 - 0.11 K/uL    NRBC (Absolute) 0.00 K/uL   Comp Metabolic Panel    Collection Time: 08/08/23 11:07 AM   Result Value Ref Range    Sodium 139  135 - 145 mmol/L    Potassium 4.1 3.6 - 5.5 mmol/L    Chloride 104 96 - 112 mmol/L    Co2 25 20 - 33 mmol/L    Anion Gap 10.0 7.0 - 16.0    Glucose 85 65 - 99 mg/dL    Bun 12 8 - 22 mg/dL    Creatinine 0.89 0.50 - 1.40 mg/dL    Calcium 9.3 8.5 - 10.5 mg/dL    Correct Calcium 8.7 8.5 - 10.5 mg/dL    AST(SGOT) 13 12 - 45 U/L    ALT(SGPT) 27 2 - 50 U/L    Alkaline Phosphatase 127 (H) 30 - 99 U/L    Total Bilirubin 0.5 0.1 - 1.5 mg/dL    Albumin 4.7 3.2 - 4.9 g/dL    Total Protein 7.2 6.0 - 8.2 g/dL    Globulin 2.5 1.9 - 3.5 g/dL    A-G Ratio 1.9 g/dL   PT    Collection Time: 23 11:07 AM   Result Value Ref Range    PT 12.8 12.0 - 14.6 sec    INR 0.97 0.87 - 1.13   APTT    Collection Time: 23 11:07 AM   Result Value Ref Range    APTT 31.7 24.7 - 36.0 sec   HEMOGLOBIN A1C    Collection Time: 23 11:07 AM   Result Value Ref Range    Glycohemoglobin 5.6 4.0 - 5.6 %    Est Avg Glucose 114 mg/dL   ESTIMATED GFR    Collection Time: 23 11:07 AM   Result Value Ref Range    GFR (CKD-EPI) 102 >60 mL/min/1.73 m 2   EKG    Collection Time: 23 11:12 AM   Result Value Ref Range    Report       [facility]    Test Date:  2023  Pt Name:    PRISCILLA REID               Department: [facility]  MRN:        [id number]                      Room:  Gender:     Male                         Technician: JESSICA  :        1970                   Requested By:FAVIO MATTSON  Order #:    [id number]                    Reading MD: Nabil Cuellar MD    Measurements  Intervals                                Axis  Rate:       62                           P:          22  MD:         200                          QRS:        61  QRSD:       94                           T:          3  QT:         423  QTc:        430    Interpretive Statements  Sinus rhythm  FIRST DEGREE AV BLOCK  Anterior infarct, old  Electronically Signed On 2023 11:33:30 PDT by Nabil Cuellar MD     PreAdmit COD    Collection Time:  08/08/23 11:19 AM   Result Value Ref Range    ABO Grouping Only O     Rh Grouping Only POS     Antibody Screen Scrn NEG    ABO Rh Confirm    Collection Time: 08/09/23  7:50 AM   Result Value Ref Range    ABO Rh Confirm O POS    Histology Request    Collection Time: 08/09/23  9:49 AM   Result Value Ref Range    Pathology Request Sent to Histo    Potassium Serum (K)    Collection Time: 08/09/23 12:32 PM   Result Value Ref Range    Potassium 4.2 3.6 - 5.5 mmol/L   Hemoglobin and Hematocrit upon arrival to unit    Collection Time: 08/09/23 12:32 PM   Result Value Ref Range    Hemoglobin 14.5 14.0 - 18.0 g/dL    Hematocrit 40.7 (L) 42.0 - 52.0 %   POCT arterial blood gas device results    Collection Time: 08/09/23 12:32 PM   Result Value Ref Range    Ph 7.366 (L) 7.400 - 7.500    Pco2 35.4 26.0 - 37.0 mmHg    Po2 104 (H) 64 - 87 mmHg    Tco2 21 20 - 33 mmol/L    S02 98 93 - 99 %    Hco3 20.3 17.0 - 25.0 mmol/L    BE -4 -4 - 3 mmol/L    Body Temp 36.1 C degrees    O2 Therapy 100 %    iPF Ratio 104     Ph Temp Alia 7.379 (L) 7.400 - 7.500    Pco2 Temp Co 34.0 26.0 - 37.0 mmHg    Po2 Temp Cor 99 (H) 64 - 87 mmHg    Specimen Arterial     DelSys Vent     End Tidal Carbon Dioxide 28 mmhg    Peep End Expiratory Pressure 8 cmh20    Percent Minute Volume 160     Mode ASV    POCT sodium device results    Collection Time: 08/09/23 12:32 PM   Result Value Ref Range    Istat Sodium 139 135 - 145 mmol/L   POCT potassium device results    Collection Time: 08/09/23 12:32 PM   Result Value Ref Range    Istat Potassium 4.1 3.6 - 5.5 mmol/L   POCT ionized CA device results    Collection Time: 08/09/23 12:32 PM   Result Value Ref Range    Istat Ionized Calcium 1.16 1.10 - 1.30 mmol/L   MAGNESIUM    Collection Time: 08/09/23 12:32 PM   Result Value Ref Range    Magnesium 3.2 (H) 1.5 - 2.5 mg/dL   EKG on arrival to CSU    Collection Time: 08/09/23 12:35 PM   Result Value Ref Range    Report       [facility]    Test Date:   2023  Pt Name:    PRISCILLA REID               Department: 161  MRN:        [id number]                      Room:       T625  Gender:     Male                         Technician: [facility]  :        1970                   Requested By:DERICK KAUFMAN  Order #:    [id number]                    Reading MD:    Measurements  Intervals                                Axis  Rate:       76                           P:          31  GA:         175                          QRS:        68  QRSD:       88                           T:          38  QT:         479  QTc:        539    Interpretive Statements  Sinus rhythm  Consider left ventricular hypertrophy  Prolonged QT interval  Compared to ECG 2023 11:12:04  Prolonged QT interval now present  First degree AV block no longer present  Myocardial infarct finding no longer present         Imaging  EC-RISHABH W/O CONT         DX-CHEST-PORTABLE (1 VIEW)   Final Result      Satisfactory postoperative appearance the chest with BILATERAL atelectasis          Assessment/Plan  S/P AVR (aortic valve replacement)  Assessment & Plan  Patient is post cardiac surgery performed by: Dr Alonso on 2023 date.   Procedure: AORTIC VALVE REPLACEMENT with a 25 mm Garrett Inspiris  Will monitor hemodynamics and titrate pressor and inotropic support.   Will follow chest tube output and correct coagulopathy.   Will monitor for post cardiac surgery complication such as myocardial dysfunction, bleeding, tamponade, graft dysfunction, arrhythmia, respiratory failure, neurologic, renal and infectious complication.    Will also monitor and treat stress hyperglycemia.       Encounter for weaning from ventilator (HCC)  Assessment & Plan  Intubated date:   Goal saturation > 92%  Monitor ventilator waveforms and titrate flow/peep and volumes according.   Lung protective ventilation strategy w/ A-F bundle  CXR: monitor lung volumes and tube/line placement  VAP bundle prevention, oral care, post  pyloric feeding  Head of bed > 30 degree  GI prophylaxis  Respiratory treatments: prn      HLD (hyperlipidemia)- (present on admission)  Assessment & Plan  Continue statin    Primary hypertension- (present on admission)  Assessment & Plan  Hx hypertension  Clevidipine gtt as need and restart home medication when indicated    Severe aortic stenosis- (present on admission)  Assessment & Plan  S/p AVR by Dr George 8/9        Discussed patient condition and risk of morbidity and/or mortality with Family, RN, RT, Pharmacy, Code status disscussed, and Charge nurse / hot rounds.      The patient remains critically ill from post cardiac surgery ventilator weaning and titration of clevidipine gtt.  Critical care time = 50 minutes in directly providing and coordinating critical care and extensive data review.  No time overlap and excludes procedures.

## 2023-08-09 NOTE — ASSESSMENT & PLAN NOTE
Intubated date: 8/9 extubated same day  Goal saturation > 92%  Pain control, IS and mobilize per post CTS protocols

## 2023-08-09 NOTE — FLOWSHEET NOTE
08/09/23 1635   Weaning Parameters   RR (bpm) 18   $ FVC / Vital Capacity (liters)  1   NIF (cm H2O)  -32   Rapid Shallow Breathing Index (RR/VT) 26   Spontaneous VE 11.1   Spontaneous

## 2023-08-09 NOTE — ASSESSMENT & PLAN NOTE
Patient is post cardiac surgery performed by: Dr Alonso on 8/9/2023 date.   Procedure: AORTIC VALVE REPLACEMENT with a 25 mm Garrett Inspiris  Will monitor hemodynamics and titrate pressor and inotropic support.   Will follow chest tube output and correct coagulopathy.   Will monitor for post cardiac surgery complication such as myocardial dysfunction, bleeding, tamponade, graft dysfunction, arrhythmia, respiratory failure, neurologic, renal and infectious complication.    Will also monitor and treat stress hyperglycemia.

## 2023-08-09 NOTE — PROGRESS NOTES
Extubation note:    RSBI: 21  Pinsp: 5  RR: 15  Peep: 8  FIO2: 40%  NIF: -29  VC: 1 liter  PACO2: 35  Ph: 7.39  Spo2: 94%  Patient is hemodynamically stable on no vasoactive medications.    Both this RN and RT, Hope, agree that patient is safe to extubate.    Patient extubated at 1643 and placed on 4 L NC. He has been educated to rest his voice for an hour to prevent airway swelling; patient nods his head in response to education.    Total intubation time: 4 hours, 22 minutes.

## 2023-08-09 NOTE — RESPIRATORY CARE
Extubation    Cuff leak noted yes  Stridor present no     FiO2%: 40 % (08/09/23 1600)  O2 (LPM): 4 (08/09/23 1643)     Patient toleration well, resting on 4L NC    RCP Complete? yes  Events/Summary/Plan: Extubation to 4L NC (08/09/23 1643)

## 2023-08-09 NOTE — ANESTHESIA TIME REPORT
Anesthesia Start and Stop Event Times     Date Time Event    8/9/2023 0731 Ready for Procedure     0800 Anesthesia Start     1235 Anesthesia Stop        Responsible Staff  08/09/23    Name Role Begin End    Tobi Yañez M.D. Anesth 0800 1235        Overtime Reason:  no overtime (within assigned shift)    Comments:

## 2023-08-09 NOTE — PROGRESS NOTES
Patient awakens, follows commands, and nods appropriately. He shakes his head when asked if he's in pain. Precedex drip restarted.

## 2023-08-09 NOTE — OP REPORT
Operative Report    PreOp Diagnosis: Severe symptomatic aortic stenosis, hypertension, hyperlipidemia      PostOp Diagnosis: Same      Procedure(s):  AORTIC VALVE REPLACEMENT with a 25 mm Garrett Inspiris, - Wound Class: Clean  ECHOCARDIOGRAM, TRANSESOPHAGEAL, INTRAOPERATIVE - Wound Class: Clean Contaminated    Surgeon(s):  Chris George D.O.    Anesthesiologist/Type of Anesthesia:  Anesthesiologist: Tobi Yañez M.D./General    Surgical Staff:  Circulator: Geovanna Mcdaniel R.N.  Perfusionist: Maik Varner  Scrub Person: Ilene Crystal; Karmen Mcdonald R.N.    Specimens removed if any:  ID Type Source Tests Collected by Time Destination   A : Aortic Valve Tissue Heart Valve PATHOLOGY SPECIMEN Chris George D.O. 8/9/2023  9:49 AM        Estimated Blood Loss: Cardiopulmonary bypass    Findings:     Pre and postoperative echo showed no wall motion abnormalities and preserved ejection fraction.  Valve was well-seated with no paravalvular leak and acceptable transvalvular gradient.    Examination of the aortic valve showed a functional bicuspid valve with 3 commissures.  He was fused between the non and the right.  Heavily calcified.    Aorta was normal.    He required defibrillation x 2 to resume normal sinus rhythm.    Vancomycin pace use for sternal protection    Complications: None immediate    Patient condition: Guarded to ICU    Chest tubes: Mediastinal: 32 Korean x2    Pacing wires: RV x 2    Pericardium: Open    Cross-clamp time: 80 minutes    Pump time: 98 minutes    Cardioplegia: Cold blood antegrade [place] cardioplegia given.  Initial induction with 1500.  300 mL warm blood given as antegrade hotshot    Vent: Aortic root      Procedure:      After informed consent was obtained, the patient was brought to the operating room.  They were placed in supine position on the operating table.  General anesthesia was induced via endotracheal tube.  Lines, arterial line, Bell were placed by the  nursing and anesthesia teams.  They received pre-incision antibiotics and beta-blockade.  The patient was prepped in a sterile fashion from their chin to their feet.  Drapes were placed in a sterile fashion.  The procedure was begun with a timeout.    I was joined throughout the case by MICAH Driscoll.  She assisted in every aspect of the procedure.    I began the procedure by making a median sternotomy incision with a 10 blade scalpel.  I deepened it to the sternum with electrocautery.  I divided the sternum in the midline with the sternal saw.  Hemostasis of the sternal edges was obtained with bone putty and electrocautery.  I dissected out the pericardium and the innominate vein.  I then opened the pericardium in the usual fashion.  Pericardial well was created, suspending the edges of the pericardium to the skin with interrupted sutures.  Palpation of the aorta revealed no calcification at the cannulation site.  The usual cannulation sutures were placed on the aorta and the right atrium.  Central cannulation was performed.  RISHABH showed good positioning of both cannulas.  Another pursestring was placed on the mid ascending aorta for the antegrade needle.  This was placed in the usual fashion.  Retrograde autologous priming of the arterial and venous lines were performed.  Cardiopulmonary bypass was then initiated.    The patient's temperature was allowed to drift down to 34 °C, and rewarming was begun once the valve was in place.  CO2 was distilled over the field.  Once that full flow, I dissected the aorta away from the pulmonary artery and dissected circumferentially around the aortic root for better exposure.  Cross-clamp was then applied and the heart arrested.  Ice slush was placed on the heart for further protection.    After arrest, the antegrade vent was turned off and a transverse oblique aortotomy created.  Stay sutures gave me a good view of the valve.  It was very heavily calcified as described  above.  The cusp were sharply resected and sent for specimen.  The annulus was then debrided with scissors and pituitary rongeurs.  Sizing was consistent with a 25 mm valve.  2 Ethibond sutures with 3 x 5 mm pledgets were placed circumferentially in a U stitch fashion around the annulus.  The sutures then placed through the sewing cuff and the valve lowered in place.  Examination showed that both coronary ostia were not covered, and the valve sat well on the annulus.  The sutures were then secured in place with the core knot system.  I reexamined the valve and showed good seating, with both coronary ostia widely patent, and no areas for perivalvular leak.    The aortotomy was then closed in 2 layers with a running 4 oh Prolene sutures.  These were started at the lateral edges and brought to the midline.  De-airing was performed, flows dropped and the cross-clamp removed.  The patient regained a normal sinus rhythm, after defibrillation x 2.  Pacing wires were placed in the usual fashion on the right ventricle and brought out through the epigastrium.  Incisions were made on the epigastrium through which I passed mediastinal chest tubes, and they were secured as well.  After the heart reperfused, the patient was then weaned off cardiopulmonary bypass.  Protamine was then administered.  The triple stage venous cannula was removed, and the blood volume given back to the patient slowly.  The antegrade needle was then removed and oversewn.  The aortic cannula was then removed and that site oversewn.  Once I was satisfied with hemostasis, I then proceeded with closure of the sternum.  The sternum was reapproximated with interrupted sternal wires.  The incision was then closed in layers in the usual fashion.  The patient tolerated the procedure well, all instrument counts were correct x2, she was transported to the ICU in guarded condition.        8/9/2023 11:43 AM Chris George D.O.

## 2023-08-09 NOTE — OR NURSING
Preop complete iv placed and cod sent to lab. Tylenol and metoprolol given all questions answered. Duffle bag to be taken to [facility]. Wife has pt phone. Dentures on chart. Taken back to or as soon as preop complete

## 2023-08-10 ENCOUNTER — HOSPITAL ENCOUNTER (OUTPATIENT)
Dept: RADIOLOGY | Facility: MEDICAL CENTER | Age: 53
End: 2023-08-10
Attending: NURSE PRACTITIONER
Payer: COMMERCIAL

## 2023-08-10 LAB
ANION GAP SERPL CALC-SCNC: 11 MMOL/L (ref 7–16)
BUN SERPL-MCNC: 13 MG/DL (ref 8–22)
CALCIUM SERPL-MCNC: 7.7 MG/DL (ref 8.5–10.5)
CHLORIDE SERPL-SCNC: 110 MMOL/L (ref 96–112)
CO2 SERPL-SCNC: 20 MMOL/L (ref 20–33)
CREAT SERPL-MCNC: 0.72 MG/DL (ref 0.5–1.4)
EKG IMPRESSION: NORMAL
EKG IMPRESSION: NORMAL
ERYTHROCYTE [DISTWIDTH] IN BLOOD BY AUTOMATED COUNT: 41.3 FL (ref 35.9–50)
GFR SERPLBLD CREATININE-BSD FMLA CKD-EPI: 108 ML/MIN/1.73 M 2
GLUCOSE BLD STRIP.AUTO-MCNC: 136 MG/DL (ref 65–99)
GLUCOSE BLD STRIP.AUTO-MCNC: 136 MG/DL (ref 65–99)
GLUCOSE BLD STRIP.AUTO-MCNC: 139 MG/DL (ref 65–99)
GLUCOSE BLD STRIP.AUTO-MCNC: 142 MG/DL (ref 65–99)
GLUCOSE BLD STRIP.AUTO-MCNC: 143 MG/DL (ref 65–99)
GLUCOSE BLD STRIP.AUTO-MCNC: 146 MG/DL (ref 65–99)
GLUCOSE BLD STRIP.AUTO-MCNC: 148 MG/DL (ref 65–99)
GLUCOSE BLD STRIP.AUTO-MCNC: 153 MG/DL (ref 65–99)
GLUCOSE BLD STRIP.AUTO-MCNC: 160 MG/DL (ref 65–99)
GLUCOSE BLD STRIP.AUTO-MCNC: 161 MG/DL (ref 65–99)
GLUCOSE BLD STRIP.AUTO-MCNC: 162 MG/DL (ref 65–99)
GLUCOSE BLD STRIP.AUTO-MCNC: 163 MG/DL (ref 65–99)
GLUCOSE BLD STRIP.AUTO-MCNC: 164 MG/DL (ref 65–99)
GLUCOSE BLD STRIP.AUTO-MCNC: 164 MG/DL (ref 65–99)
GLUCOSE BLD STRIP.AUTO-MCNC: 166 MG/DL (ref 65–99)
GLUCOSE BLD STRIP.AUTO-MCNC: 177 MG/DL (ref 65–99)
GLUCOSE BLD STRIP.AUTO-MCNC: 183 MG/DL (ref 65–99)
GLUCOSE BLD STRIP.AUTO-MCNC: 192 MG/DL (ref 65–99)
GLUCOSE BLD STRIP.AUTO-MCNC: 204 MG/DL (ref 65–99)
GLUCOSE BLD STRIP.AUTO-MCNC: 224 MG/DL (ref 65–99)
GLUCOSE SERPL-MCNC: 133 MG/DL (ref 65–99)
HCT VFR BLD AUTO: 40.2 % (ref 42–52)
HGB BLD-MCNC: 14.1 G/DL (ref 14–18)
LV EJECT FRACT  99904: 60
MCH RBC QN AUTO: 29.6 PG (ref 27–33)
MCHC RBC AUTO-ENTMCNC: 35.1 G/DL (ref 32.3–36.5)
MCV RBC AUTO: 84.3 FL (ref 81.4–97.8)
PLATELET # BLD AUTO: 207 K/UL (ref 164–446)
PMV BLD AUTO: 10 FL (ref 9–12.9)
POTASSIUM SERPL-SCNC: 4.1 MMOL/L (ref 3.6–5.5)
POTASSIUM SERPL-SCNC: 4.1 MMOL/L (ref 3.6–5.5)
RBC # BLD AUTO: 4.77 M/UL (ref 4.7–6.1)
SODIUM SERPL-SCNC: 141 MMOL/L (ref 135–145)
WBC # BLD AUTO: 22.2 K/UL (ref 4.8–10.8)

## 2023-08-10 PROCEDURE — 700105 HCHG RX REV CODE 258: Performed by: NURSE PRACTITIONER

## 2023-08-10 PROCEDURE — 71045 X-RAY EXAM CHEST 1 VIEW: CPT

## 2023-08-10 PROCEDURE — 770022 HCHG ROOM/CARE - ICU (200)

## 2023-08-10 PROCEDURE — 700102 HCHG RX REV CODE 250 W/ 637 OVERRIDE(OP): Performed by: INTERNAL MEDICINE

## 2023-08-10 PROCEDURE — A9270 NON-COVERED ITEM OR SERVICE: HCPCS | Performed by: NURSE PRACTITIONER

## 2023-08-10 PROCEDURE — 700101 HCHG RX REV CODE 250: Performed by: NURSE PRACTITIONER

## 2023-08-10 PROCEDURE — 99291 CRITICAL CARE FIRST HOUR: CPT | Performed by: INTERNAL MEDICINE

## 2023-08-10 PROCEDURE — 93005 ELECTROCARDIOGRAM TRACING: CPT | Performed by: THORACIC SURGERY (CARDIOTHORACIC VASCULAR SURGERY)

## 2023-08-10 PROCEDURE — 94669 MECHANICAL CHEST WALL OSCILL: CPT

## 2023-08-10 PROCEDURE — 80048 BASIC METABOLIC PNL TOTAL CA: CPT

## 2023-08-10 PROCEDURE — 93005 ELECTROCARDIOGRAM TRACING: CPT | Performed by: NURSE PRACTITIONER

## 2023-08-10 PROCEDURE — 700111 HCHG RX REV CODE 636 W/ 250 OVERRIDE (IP): Performed by: THORACIC SURGERY (CARDIOTHORACIC VASCULAR SURGERY)

## 2023-08-10 PROCEDURE — 700102 HCHG RX REV CODE 250 W/ 637 OVERRIDE(OP): Performed by: NURSE PRACTITIONER

## 2023-08-10 PROCEDURE — 700111 HCHG RX REV CODE 636 W/ 250 OVERRIDE (IP): Performed by: NURSE PRACTITIONER

## 2023-08-10 PROCEDURE — 82962 GLUCOSE BLOOD TEST: CPT | Mod: 91

## 2023-08-10 PROCEDURE — 93010 ELECTROCARDIOGRAM REPORT: CPT | Performed by: INTERNAL MEDICINE

## 2023-08-10 PROCEDURE — 85027 COMPLETE CBC AUTOMATED: CPT

## 2023-08-10 PROCEDURE — 84132 ASSAY OF SERUM POTASSIUM: CPT

## 2023-08-10 RX ORDER — POTASSIUM CHLORIDE 20 MEQ/1
40 TABLET, EXTENDED RELEASE ORAL DAILY
Status: DISCONTINUED | OUTPATIENT
Start: 2023-08-10 | End: 2023-08-10

## 2023-08-10 RX ORDER — LISINOPRIL 20 MG/1
20 TABLET ORAL DAILY
Status: DISCONTINUED | OUTPATIENT
Start: 2023-08-10 | End: 2023-08-13 | Stop reason: HOSPADM

## 2023-08-10 RX ORDER — POTASSIUM CHLORIDE 7.45 MG/ML
10 INJECTION INTRAVENOUS ONCE
Status: COMPLETED | OUTPATIENT
Start: 2023-08-10 | End: 2023-08-10

## 2023-08-10 RX ORDER — POTASSIUM CHLORIDE 20 MEQ/1
40 TABLET, EXTENDED RELEASE ORAL 2 TIMES DAILY
Status: DISCONTINUED | OUTPATIENT
Start: 2023-08-10 | End: 2023-08-13 | Stop reason: HOSPADM

## 2023-08-10 RX ORDER — ATORVASTATIN CALCIUM 20 MG/1
20 TABLET, FILM COATED ORAL EVERY EVENING
Status: DISCONTINUED | OUTPATIENT
Start: 2023-08-10 | End: 2023-08-13 | Stop reason: HOSPADM

## 2023-08-10 RX ORDER — HYDRALAZINE HYDROCHLORIDE 20 MG/ML
20 INJECTION INTRAMUSCULAR; INTRAVENOUS EVERY 6 HOURS PRN
Status: DISCONTINUED | OUTPATIENT
Start: 2023-08-10 | End: 2023-08-13 | Stop reason: HOSPADM

## 2023-08-10 RX ORDER — DEXTROSE MONOHYDRATE 25 G/50ML
25 INJECTION, SOLUTION INTRAVENOUS
Status: DISCONTINUED | OUTPATIENT
Start: 2023-08-10 | End: 2023-08-12

## 2023-08-10 RX ORDER — FUROSEMIDE 10 MG/ML
40 INJECTION INTRAMUSCULAR; INTRAVENOUS
Status: DISCONTINUED | OUTPATIENT
Start: 2023-08-10 | End: 2023-08-10

## 2023-08-10 RX ORDER — FUROSEMIDE 10 MG/ML
40 INJECTION INTRAMUSCULAR; INTRAVENOUS
Status: DISCONTINUED | OUTPATIENT
Start: 2023-08-10 | End: 2023-08-13 | Stop reason: HOSPADM

## 2023-08-10 RX ORDER — LISINOPRIL 10 MG/1
10 TABLET ORAL
Status: DISCONTINUED | OUTPATIENT
Start: 2023-08-10 | End: 2023-08-10

## 2023-08-10 RX ORDER — AMLODIPINE BESYLATE 5 MG/1
5 TABLET ORAL
Status: DISCONTINUED | OUTPATIENT
Start: 2023-08-10 | End: 2023-08-13 | Stop reason: HOSPADM

## 2023-08-10 RX ORDER — DEXAMETHASONE SODIUM PHOSPHATE 4 MG/ML
10 INJECTION, SOLUTION INTRA-ARTICULAR; INTRALESIONAL; INTRAMUSCULAR; INTRAVENOUS; SOFT TISSUE ONCE
Status: COMPLETED | OUTPATIENT
Start: 2023-08-10 | End: 2023-08-10

## 2023-08-10 RX ADMIN — MAGNESIUM SULFATE 1 G: 1 INJECTION INTRAVENOUS at 06:17

## 2023-08-10 RX ADMIN — SENNOSIDES AND DOCUSATE SODIUM 2 TABLET: 50; 8.6 TABLET ORAL at 17:10

## 2023-08-10 RX ADMIN — METOPROLOL TARTRATE 12.5 MG: 25 TABLET, FILM COATED ORAL at 06:08

## 2023-08-10 RX ADMIN — ACETAMINOPHEN 1000 MG: 500 TABLET, FILM COATED ORAL at 11:11

## 2023-08-10 RX ADMIN — ACETAMINOPHEN 1000 MG: 500 TABLET, FILM COATED ORAL at 22:18

## 2023-08-10 RX ADMIN — ACETAMINOPHEN 1000 MG: 500 TABLET, FILM COATED ORAL at 17:09

## 2023-08-10 RX ADMIN — LISINOPRIL 20 MG: 20 TABLET ORAL at 12:50

## 2023-08-10 RX ADMIN — POTASSIUM CHLORIDE 10 MEQ: 7.46 INJECTION, SOLUTION INTRAVENOUS at 02:26

## 2023-08-10 RX ADMIN — TRAMADOL HYDROCHLORIDE 50 MG: 50 TABLET ORAL at 20:40

## 2023-08-10 RX ADMIN — Medication 1 APPLICATOR: at 20:40

## 2023-08-10 RX ADMIN — Medication 1 APPLICATOR: at 10:03

## 2023-08-10 RX ADMIN — INSULIN HUMAN 3 UNITS: 100 INJECTION, SOLUTION PARENTERAL at 17:07

## 2023-08-10 RX ADMIN — SENNOSIDES AND DOCUSATE SODIUM 2 TABLET: 50; 8.6 TABLET ORAL at 06:08

## 2023-08-10 RX ADMIN — OXYCODONE HYDROCHLORIDE 5 MG: 5 TABLET ORAL at 00:51

## 2023-08-10 RX ADMIN — AMLODIPINE BESYLATE 5 MG: 10 TABLET ORAL at 16:56

## 2023-08-10 RX ADMIN — POTASSIUM CHLORIDE 40 MEQ: 1500 TABLET, EXTENDED RELEASE ORAL at 17:10

## 2023-08-10 RX ADMIN — DEXMEDETOMIDINE HYDROCHLORIDE 0.3 MCG/KG/HR: 100 INJECTION, SOLUTION INTRAVENOUS at 00:52

## 2023-08-10 RX ADMIN — ACETAMINOPHEN 1000 MG: 500 TABLET, FILM COATED ORAL at 06:08

## 2023-08-10 RX ADMIN — PROCHLORPERAZINE EDISYLATE 10 MG: 5 INJECTION INTRAMUSCULAR; INTRAVENOUS at 06:24

## 2023-08-10 RX ADMIN — POTASSIUM CHLORIDE 40 MEQ: 1500 TABLET, EXTENDED RELEASE ORAL at 11:11

## 2023-08-10 RX ADMIN — DEXAMETHASONE SODIUM PHOSPHATE 10 MG: 4 INJECTION INTRA-ARTICULAR; INTRALESIONAL; INTRAMUSCULAR; INTRAVENOUS; SOFT TISSUE at 09:59

## 2023-08-10 RX ADMIN — ACETAMINOPHEN 1000 MG: 500 TABLET, FILM COATED ORAL at 00:11

## 2023-08-10 RX ADMIN — FUROSEMIDE 40 MG: 10 INJECTION INTRAMUSCULAR; INTRAVENOUS at 11:11

## 2023-08-10 RX ADMIN — OMEPRAZOLE 20 MG: 20 CAPSULE, DELAYED RELEASE ORAL at 06:08

## 2023-08-10 RX ADMIN — ASPIRIN 81 MG: 81 TABLET, COATED ORAL at 06:08

## 2023-08-10 RX ADMIN — METOPROLOL TARTRATE 12.5 MG: 25 TABLET, FILM COATED ORAL at 17:11

## 2023-08-10 RX ADMIN — HYDRALAZINE HYDROCHLORIDE 20 MG: 20 INJECTION, SOLUTION INTRAMUSCULAR; INTRAVENOUS at 18:19

## 2023-08-10 RX ADMIN — POLYETHYLENE GLYCOL 3350 1 PACKET: 17 POWDER, FOR SOLUTION ORAL at 06:09

## 2023-08-10 RX ADMIN — POTASSIUM CHLORIDE 10 MEQ: 7.46 INJECTION, SOLUTION INTRAVENOUS at 07:58

## 2023-08-10 RX ADMIN — ATORVASTATIN CALCIUM 20 MG: 20 TABLET, FILM COATED ORAL at 17:10

## 2023-08-10 RX ADMIN — FUROSEMIDE 40 MG: 10 INJECTION INTRAMUSCULAR; INTRAVENOUS at 16:57

## 2023-08-10 RX ADMIN — INSULIN HUMAN 3 UNITS: 100 INJECTION, SOLUTION PARENTERAL at 20:37

## 2023-08-10 RX ADMIN — ENOXAPARIN SODIUM 40 MG: 100 INJECTION SUBCUTANEOUS at 17:10

## 2023-08-10 ASSESSMENT — ENCOUNTER SYMPTOMS
NAUSEA: 1
HEADACHES: 0
CLAUDICATION: 0
SORE THROAT: 0
FOCAL WEAKNESS: 0
COUGH: 0
SHORTNESS OF BREATH: 0
SENSORY CHANGE: 0
VOMITING: 1
FEVER: 0
SPUTUM PRODUCTION: 0
WEAKNESS: 0
BACK PAIN: 0
DEPRESSION: 0
ORTHOPNEA: 0
TINGLING: 0
DOUBLE VISION: 1
ABDOMINAL PAIN: 0
MYALGIAS: 0

## 2023-08-10 ASSESSMENT — PAIN DESCRIPTION - PAIN TYPE
TYPE: SURGICAL PAIN
TYPE: SURGICAL PAIN
TYPE: ACUTE PAIN;SURGICAL PAIN
TYPE: SURGICAL PAIN
TYPE: SURGICAL PAIN
TYPE: ACUTE PAIN;SURGICAL PAIN
TYPE: SURGICAL PAIN
TYPE: ACUTE PAIN;SURGICAL PAIN

## 2023-08-10 ASSESSMENT — FIBROSIS 4 INDEX: FIB4 SCORE: 0.65

## 2023-08-10 ASSESSMENT — LIFESTYLE VARIABLES: SUBSTANCE_ABUSE: 0

## 2023-08-10 NOTE — PROGRESS NOTES
Cardiovascular Surgery Progress Note    Name: Jassi Rooney  MRN: [id number]  : 1970  Admit Date: 2023  5:01 AM  1 Day Post-Op     Procedure:  Procedure(s) and Anesthesia Type:     * AORTIC VALVE REPLACEMENT, - General     * ECHOCARDIOGRAM, TRANSESOPHAGEAL, INTRAOPERATIVE - General    Vitals:  Vitals:    08/10/23 0500 08/10/23 0600 08/10/23 0608 08/10/23 0750   BP: 102/62 (!) 145/85 122/69    Pulse: 76 69 70 76   Resp: 15 13 17 18   Temp:       TempSrc:       SpO2: 93% 94% 94% 94%   Weight:  99.7 kg (219 lb 12.8 oz)     Height:          Temp (24hrs), Av.7 °C (98 °F), Min:36.3 °C (97.3 °F), Max:37.1 °C (98.8 °F)      Respiratory:  2L NC   Respiration: 18, Pulse Oximetry: 94 %       Fluids:    Intake/Output Summary (Last 24 hours) at 8/10/2023 1004  Last data filed at 8/10/2023 0800  Gross per 24 hour   Intake 6784.62 ml   Output 2851 ml   Net 3933.62 ml     Admit weight: Weight: 97.3 kg (214 lb 8.1 oz)  Current weight: Weight: 99.7 kg (219 lb 12.8 oz) (08/10/23 0600)    Labs:  Recent Labs     23  1107 23  1232 08/10/23  0023   WBC 8.0  --  22.2*   RBC 5.42  --  4.77   HEMOGLOBIN 16.0 14.5 14.1   HEMATOCRIT 45.9 40.7* 40.2*   MCV 84.7  --  84.3   MCH 29.5  --  29.6   MCHC 34.9  --  35.1   RDW 40.2  --  41.3   PLATELETCT 265 202 207   MPV 9.7  --  10.0     Recent Labs     23  1107 23  1232 23  1705 08/10/23  0023 08/10/23  0600   SODIUM 139  --   --  141  --    POTASSIUM 4.1   < > 3.8 4.1 4.1   CHLORIDE 104  --   --  110  --    CO2 25  --   --  20  --    GLUCOSE 85  --   --  133*  --    BUN 12  --   --  13  --    CREATININE 0.89  --   --  0.72  --    CALCIUM 9.3  --   --  7.7*  --     < > = values in this interval not displayed.     Recent Labs     23  1107 23  1232   APTT 31.7 34.5   INR 0.97 1.34*           Medications:  Scheduled Medications   Medication Dose Frequency    insulin regular  2-9 Units 4X/DAY ACHS    enoxaparin (LOVENOX) injection  40 mg DAILY  AT 1800    Nozin nasal  swab  1 Applicator BID    magnesium sulfate  1 g DAILY    aspirin  81 mg DAILY    acetaminophen  1,000 mg Q6HRS    senna-docusate  2 Tablet BID    And    polyethylene glycol/lytes  1 Packet DAILY    And    [START ON 8/11/2023] magnesium hydroxide  30 mL DAILY    omeprazole  20 mg DAILY    metoprolol tartrate  12.5 mg BID    Followed by    [START ON 8/11/2023] metoprolol tartrate  25 mg BID    insulin regular  0-14 Units Once    insulin lispro  0-14 Units TID AC        Exam:   Physical Exam  Vitals and nursing note reviewed.   Constitutional:       General: He is not in acute distress.     Appearance: Normal appearance.   HENT:      Head: Normocephalic.      Right Ear: External ear normal.      Left Ear: External ear normal.      Nose: Nose normal. No congestion.      Mouth/Throat:      Mouth: Mucous membranes are moist.      Pharynx: Oropharynx is clear.   Eyes:      Extraocular Movements: Extraocular movements intact.      Pupils: Pupils are equal, round, and reactive to light.   Cardiovascular:      Rate and Rhythm: Normal rate and regular rhythm.      Pulses: Normal pulses.      Heart sounds: Normal heart sounds.   Pulmonary:      Effort: Pulmonary effort is normal.      Breath sounds: Decreased breath sounds present.   Abdominal:      General: Bowel sounds are decreased. There is no distension.      Palpations: Abdomen is soft.   Musculoskeletal:      Cervical back: Normal range of motion and neck supple. No tenderness.      Right lower leg: Edema present.      Left lower leg: Edema present.   Skin:     General: Skin is warm and dry.      Comments: Midline surgical incision   Neurological:      General: No focal deficit present.      Mental Status: He is alert and oriented to person, place, and time. Mental status is at baseline.   Psychiatric:         Mood and Affect: Mood normal.         Behavior: Behavior normal.         Thought Content: Thought content normal.         Cardiac  Medications:    ASA - Yes    Plavix - No; contraindicated because of Other no CAD    Post-operative Beta Blockers - Yes    Ace/ARB- No; contraindicated because of Normal EF    Statin - No; contraindicated because of No CAD    Aldactone- No; contraindicated because of Normal EF    SGLT2i-  No contraindicated because of cost prohibitive    Ejection Fraction:  RISHABH report pending    Telemetry:   SR 60-70s    Assessment/Plan:  POD 1  HDS, SR, neuro intact, wounds intact, abdomen soft, fluid balance positive, wt up, 2 L NC. 150 mL out of chest tubes overnight. Nausea and vomiting this AM not relieved by ondansetron or prochlorperazine. Plan:  Dc mediastinal chest tubes and schulz. Diurese. One time dose of decadron for post op nausea. IS/ambulate.       Disposition:  TBD

## 2023-08-10 NOTE — PROGRESS NOTES
4 Eyes Skin Assessment Completed by MACARIO Reyes and MACARIO Alvarado.    Head WDL  Ears WDL  Nose WDL  Mouth WDL  Neck WDL  Breast/Chest Incision  Shoulder Blades WDL  Spine WDL  (R) Arm/Elbow/Hand WDL  (L) Arm/Elbow/Hand WDL  Abdomen WDL  Groin WDL  Scrotum/Coccyx/Buttocks WDL  (R) Leg WDL  (L) Leg WDL  (R) Heel/Foot/Toe WDL  (L) Heel/Foot/Toe WDL          Devices In Places ECG, Blood Pressure Cuff, Pulse Ox, Bell, Central Line, and Nasal Cannula      Interventions In Place NC W/Ear Foams, Pillows, Q2 Turns, and Low Air Loss Mattress    Possible Skin Injury No    Pictures Uploaded Into Epic N/A  Wound Consult Placed N/A  RN Wound Prevention Protocol Ordered No

## 2023-08-10 NOTE — CARE PLAN
Problem: Hyperinflation  Goal: Prevent or improve atelectasis  Description: Target End Date:  3 to 4 days    1. Instruct incentive spirometry usage  2.  Perform hyperinflation therapy as indicated  Outcome: Progressing  Hyperinflation Protocol Goals/Outcome: Stable Vital Capacity x24 hrs and Patient Understands / uses I.S.  Hyperinflation Protocol Indications: Abdominal/Thoracic Surgeries (Open Heart)       Respiratory Update    Treatment modality: PEP  Frequency: QID    IS: 1500    Pt tolerating current treatments well with no adverse reactions.

## 2023-08-10 NOTE — PROGRESS NOTES
Critical Care Progress Note    Date of admission  8/9/2023    Chief Complaint  53 y.o. male who presented 8/9/2023 with hx of hypertension, chol and bicuspid severe symptomatic AS. He today underwent elective AVR with Dr George and was uneventful course. He was an easy intubation had left radial marilu, left subclavian lines placed. He was given 1.5L of fluids, cell saver of 500ml, made 850ml of urine and was on clevidipine during case. His pre and post bypass EF was preserved with 60%. He did required 3 shock for Vfib coming off pump. He should be a early extubation candidate.     Hospital Course  8/9 post AVR surgery    Interval Problem Update  Reviewed last 24 hour events:  Neuro: intact moving all ext  HR: 60-70's  SBP: 's  Tmax: afebrile  GI: clear diet, BM pta  UOP: good  Lines: central line, schulz, marilu  Resp: 3l n/c IS 1250ml   Vte: per CTS  PPI/H2:ppi  Antibx: none  Net + 4L    Review of Systems  Review of Systems   Constitutional:  Positive for malaise/fatigue. Negative for fever.   HENT:  Negative for sore throat.    Eyes:  Positive for double vision.   Respiratory:  Negative for cough, sputum production and shortness of breath.    Cardiovascular:  Positive for chest pain. Negative for orthopnea, claudication and leg swelling.   Gastrointestinal:  Positive for nausea and vomiting. Negative for abdominal pain.   Genitourinary:  Negative for dysuria and hematuria.   Musculoskeletal:  Negative for back pain, joint pain and myalgias.   Neurological:  Negative for tingling, sensory change, focal weakness, weakness and headaches.   Psychiatric/Behavioral:  Negative for depression and substance abuse.         Vital Signs for last 24 hours   Temp:  [36.3 °C (97.3 °F)-37.1 °C (98.8 °F)] 37.1 °C (98.8 °F)  Pulse:  [59-77] 76  Resp:  [10-29] 18  BP: ()/(51-85) 122/69  SpO2:  [90 %-99 %] 94 %    Hemodynamic parameters for last 24 hours  CVP:  [0 MM HG-231 MM HG] 1 MM HG    Respiratory Information for  the last 24 hours  Vent Mode: Spont  PEEP/CPAP: 8  MAP: 9.6  Control VTE (exp VT): 432    Physical Exam   Physical Exam  Vitals and nursing note reviewed.   Constitutional:       General: He is not in acute distress.     Appearance: Normal appearance. He is not ill-appearing.      Comments: Sitting up in chair no acute distress   HENT:      Head: Normocephalic.      Mouth/Throat:      Mouth: Mucous membranes are moist.   Eyes:      Pupils: Pupils are equal, round, and reactive to light.   Cardiovascular:      Rate and Rhythm: Normal rate.      Heart sounds: No murmur heard.  Pulmonary:      Comments: Minimal splinting, bandages clean dry, CT with minimal output  Abdominal:      General: There is no distension.      Palpations: There is no mass.      Tenderness: There is no abdominal tenderness. There is no guarding or rebound.      Hernia: No hernia is present.   Musculoskeletal:         General: No swelling or tenderness.      Cervical back: No rigidity.   Skin:     Coloration: Skin is not jaundiced or pale.   Neurological:      General: No focal deficit present.      Mental Status: He is alert and oriented to person, place, and time.      Cranial Nerves: No cranial nerve deficit.      Sensory: No sensory deficit.      Motor: No weakness.      Coordination: Coordination normal.   Psychiatric:         Mood and Affect: Mood normal.         Medications  Current Facility-Administered Medications   Medication Dose Route Frequency Provider Last Rate Last Admin    insulin regular (HumuLIN R,NovoLIN R) injection  2-9 Units Subcutaneous 4X/DAY ACHS Martin Austin M.D.        And    dextrose 50% (D50W) injection 25 g  25 g Intravenous Q15 MIN PRN Martin Austin M.D.        Respiratory Therapy Consult   Nebulization Continuous RT DANGELO CampuzanoRFIDEL        NS infusion   Intravenous Continuous DANGELO CampuzanoRYOLANDE.   Stopped at 08/10/23 0615    NS infusion   Intravenous Continuous CASS Campuzano    Stopped at 08/09/23 2104    electrolyte-A (Plasmalyte-A) infusion   Intravenous PRN ENA Campuzano.P.R.N. 999 mL/hr at 08/09/23 1526 New Bag at 08/09/23 1526    enoxaparin (Lovenox) inj 40 mg  40 mg Subcutaneous DAILY AT 1800 ENA Campuzano.P.R.NSerena        Nozin nasal  swab  1 Applicator Each Nostril BID JUDY CampuzanoP.R.N.   1 Applicator at 08/09/23 2259    calcium CHLORIDE 1,000 mg in  mL IVPB  1,000 mg Intravenous Once PRN ENA Campuzano.P.R.NSerena        magnesium sulfate in D5W IVPB premix 1 g  1 g Intravenous DAILY ENA Campuzano.P.R.N.   Stopped at 08/10/23 0717    aspirin EC tablet 81 mg  81 mg Oral DAILY ENA Campuzano.P.R.N.   81 mg at 08/10/23 0608    clevidipine (Cleviprex) IV emulsion  0-21 mg/hr Intravenous Continuous ENA Campuzano.P.R.N.   Dose not Required at 08/09/23 1253    nitroglycerin 50 mg in D5W 250 ml infusion  0-100 mcg/min Intravenous Continuous ENA Campuzano.P.R.N.   Dose not Required at 08/09/23 1300    acetaminophen (Tylenol) tablet 1,000 mg  1,000 mg Oral Q6HRS ENA Campuzano.P.R.N.   1,000 mg at 08/10/23 0608    Followed by    [START ON 8/14/2023] acetaminophen (Tylenol) tablet 1,000 mg  1,000 mg Oral Q6HRS PRN ENA Campuzano.P.R.N.        oxyCODONE immediate-release (Roxicodone) tablet 5 mg  5 mg Oral Q3HRS PRN ENA Campuzano.P.R.N.   5 mg at 08/10/23 0051    Or    oxyCODONE immediate release (Roxicodone) tablet 10 mg  10 mg Oral Q3HRS PRN ENA Campuzano.P.R.N.        Or    fentaNYL (Sublimaze) injection 50 mcg  50 mcg Intravenous Q3HRS PRN JUDY CampuzanoP.R.N.        traMADol (Ultram) 50 MG tablet 50 mg  50 mg Oral Q4HRS PRN ENA Campuzano.P.R.N.        midazolam (Versed) injection 2 mg  2 mg Intravenous Q HOUR PRN JUDY CampuzanoP.R.N.        dexmedetomidine (PRECEDEX) 400 mcg/100mL NS premix infusion  0-1.5 mcg/kg/hr (Ideal) Intravenous Continuous JUDY CampuzanoP.R.UGO.   Stopped at  08/10/23 0447    sodium bicarbonate 8.4 % injection 50 mEq  50 mEq Intravenous Q HOUR PRN ENA Campuzano.P.R.N.        morphine 4 MG/ML injection 4 mg  4 mg Intravenous Q HOUR PRN ENA Campuzano.P.R.N.        ondansetron (Zofran) syringe/vial injection 8 mg  8 mg Intravenous Q6HRS PRN ENA Campuzano.P.R.N.   8 mg at 08/09/23 1834    Or    prochlorperazine (Compazine) injection 10 mg  10 mg Intravenous Q6HRS PRN ENA Campuzano.P.R.N.   10 mg at 08/10/23 0624    senna-docusate (Pericolace Or Senokot S) 8.6-50 MG per tablet 2 Tablet  2 Tablet Oral BID ENA Campuzano.P.R.N.   2 Tablet at 08/10/23 0608    And    polyethylene glycol/lytes (Miralax) PACKET 1 Packet  1 Packet Oral DAILY ENA Campuzano.P.R.N.   1 Packet at 08/10/23 0609    And    [START ON 8/11/2023] magnesium hydroxide (Milk Of Magnesia) suspension 30 mL  30 mL Oral DAILY ENA Campuzano.P.R.N.        And    bisacodyl (Dulcolax) suppository 10 mg  10 mg Rectal QDAY PRN ENA Campuzano.P.R.N.        omeprazole (PriLOSEC) capsule 20 mg  20 mg Oral DAILY ENA Campuzano.P.R.N.   20 mg at 08/10/23 0608    mag hydrox-al hydrox-simeth (Maalox Plus Es Or Mylanta Ds) suspension 30 mL  30 mL Oral Q4HRS PRN ENA Campuzano.P.R.N.        diphenhydrAMINE (Benadryl) tablet/capsule 25 mg  25 mg Oral HS PRN - MR X 1 ENA Campuzano.P.R.N.        metoprolol tartrate (Lopressor) tablet 12.5 mg  12.5 mg Oral BID ENA Campuzano.P.R.N.   12.5 mg at 08/10/23 0608    Followed by    [START ON 8/11/2023] metoprolol tartrate (Lopressor) tablet 25 mg  25 mg Oral BID JUDY CampuzanoPSerenaRFIDEL        norepinephrine (Levophed) 8 mg in 250 mL NS infusion (premix)  0-1 mcg/kg/min (Ideal) Intravenous Continuous JUDY CampuzanoPSerenaRFIDEL        insulin regular (HumuLIN R,NovoLIN R) injection  0-14 Units Intravenous Once JUDY CampuzanoP.RFIDEL        insulin lispro (HumaLOG,AdmeLOG) injection  0-14 Units Subcutaneous TID LING Purcell  CASS Brewer        insulin regular (Humulin R) 100 Units in  mL Infusion  0-29 Units/hr Intravenous Continuous CASS Campuzano 1 mL/hr at 08/10/23 0910 1 Units/hr at 08/10/23 0910       Fluids    Intake/Output Summary (Last 24 hours) at 8/10/2023 1002  Last data filed at 8/10/2023 0800  Gross per 24 hour   Intake 6784.62 ml   Output 2851 ml   Net 3933.62 ml       Laboratory  Recent Labs     08/09/23  1134 08/09/23  1232 08/09/23  1336   ISTATAPH 7.311* 7.366* 7.379*   ISTATAPCO2 43.7* 35.4 32.3   ISTATAPO2 196* 104* 81   ISTATATCO2 23 21 20   WUPSSTT2MDY 100* 98 96   ISTATARTHCO3 22.0 20.3 19.1   ISTATARTBE -4 -4 -5*   ISTATTEMP 36.7 C 36.1 C 36.2 C   ISTATFIO2  --  100 80   ISTATSPEC Arterial Arterial Arterial   ISTATAPHTC 7.315* 7.379* 7.390*   [id number] 195* 99* 77         Recent Labs     08/08/23  1107 08/09/23  1232 08/09/23  1705 08/10/23  0023 08/10/23  0600   SODIUM 139  --   --  141  --    POTASSIUM 4.1 4.2 3.8 4.1 4.1   CHLORIDE 104  --   --  110  --    CO2 25  --   --  20  --    BUN 12  --   --  13  --    CREATININE 0.89  --   --  0.72  --    MAGNESIUM  --  3.2*  --   --   --    CALCIUM 9.3  --   --  7.7*  --      Recent Labs     08/08/23  1107 08/10/23  0023   ALTSGPT 27  --    ASTSGOT 13  --    ALKPHOSPHAT 127*  --    TBILIRUBIN 0.5  --    GLUCOSE 85 133*     Recent Labs     08/08/23  1107 08/10/23  0023   WBC 8.0 22.2*   NEUTSPOLYS 56.30  --    LYMPHOCYTES 29.70  --    MONOCYTES 9.60  --    EOSINOPHILS 3.40  --    BASOPHILS 0.50  --    ASTSGOT 13  --    ALTSGPT 27  --    ALKPHOSPHAT 127*  --    TBILIRUBIN 0.5  --      Recent Labs     08/08/23  1107 08/09/23  1232 08/10/23  0023   RBC 5.42  --  4.77   HEMOGLOBIN 16.0 14.5 14.1   HEMATOCRIT 45.9 40.7* 40.2*   PLATELETCT 265 202 207   PROTHROMBTM 12.8 16.4*  --    APTT 31.7 34.5  --    INR 0.97 1.34*  --        Imaging  X-Ray:  I have personally reviewed the images and compared with prior images.    Assessment/Plan  S/P AVR  (aortic valve replacement)  Assessment & Plan  Patient is post cardiac surgery performed by: Dr Alonso on 8/9/2023 date.   Procedure: AORTIC VALVE REPLACEMENT with a 25 mm Garrett Inspiris  Will monitor hemodynamics and titrate pressor and inotropic support.   Will follow chest tube output and correct coagulopathy.   Will monitor for post cardiac surgery complication such as myocardial dysfunction, bleeding, tamponade, graft dysfunction, arrhythmia, respiratory failure, neurologic, renal and infectious complication.    Will also monitor and treat stress hyperglycemia.       Encounter for weaning from ventilator (HCC)  Assessment & Plan  Intubated date: 8/9 extubated same day  Goal saturation > 92%  Pain control, IS and mobilize per post CTS protocols    HLD (hyperlipidemia)- (present on admission)  Assessment & Plan  Continue statin    Primary hypertension- (present on admission)  Assessment & Plan  Hx hypertension  Clevidipine gtt as need and restart home medication when indicated    Severe aortic stenosis- (present on admission)  Assessment & Plan  S/p AVR by Dr George 8/9         VTE:  Lovenox  Ulcer: PPI  Lines: Central Line  Ongoing indication addressed and Bell Catheter  Ongoing indication addressed    I have performed a physical exam and reviewed and updated ROS and Plan today (8/10/2023). In review of yesterday's note (8/9/2023), there are no changes except as documented above.     Discussed patient condition and risk of morbidity and/or mortality with Family, RN, RT, Pharmacy, Charge nurse / hot rounds, Patient, and CVS    The patient remains critically ill on insulin gtt with active titration.  Critical care time = 40 minutes in directly providing and coordinating critical care and extensive data review.  No time overlap and excludes procedures.

## 2023-08-10 NOTE — PROGRESS NOTES
Patient is having double vision and some blurry vision. Patient is still on Precedex gtt. SIMONE Shore, notified. No new orders received as the double vision/blurry vision could be a consequence of the sedation.

## 2023-08-10 NOTE — CARE PLAN
"The patient is Stable - Low risk of patient condition declining or worsening    Shift Goals  Clinical Goals:  (extubate, monitor hemodynamics, keep hemodnyamics within established range)  Family Goals: for patient to get \"good solid rest\"    Progress made toward(s) clinical / shift goals:    Problem: Day of surgery post CABG/Heart valve replacement  Goal: Stabilization in immediate post op period  Outcome: Progressing  Intervention: VS q 15 min x 4 hours, then q 1 hour. Include temperature immediately upon arrival. Check CO/CI q 2-4 hours and PRN  Note: Vitals continued per protocol Q1 hour  Intervention: Serum K q 6 hours x 24 hours.  ABG and CBC prn.  Note: K scale per protocol  Intervention: Initiate post cardiac insulin infusion protocol orders for FSBS greater than 140 and check frequency per protocol  Note: FSBG Q1 hour with insulin drip  Intervention: For patients on Beta Blockers: verify dose given prior to surgery or within 6 hours after arrival to the unit  Note: Beta blocker given this morning  Intervention: Chest tube to 20 cm suction, record CT drainage with VS, and check for air leak  Note: CT no air leak  Intervention: For CT drainage >300 mL in first hour post op and/or 150 mL in subsequent hours: Stat platelets, PT, INR, TEG, iSTAT, and H&H per order  Note: CT output total 175    Intervention: Titrate and wean off vasoactive drips per patient's condition and per MD order while maintaining SBP  mmHg per MD order  Note: No drips  Intervention: IS q 1 hour while awake post extubation  Note: Best IS 1200 overnight  Intervention: Up in chair 4 hours, day of extubation  Note: Up in chair this morning  Intervention: Maintain all original surgical dressings per provider orders and specifications  Note: Island dressing still in place  Intervention: Clear liquids post extubation, order carbohydrate free (post cardiac surgery) diet, advance as tolerated  Note: Clear diet ordered  Intervention: " Discontinue [place] ernie and arterial line 12-18 hours post op if hemodynamically stable and off vasoactive drips  Note: Art line D/C per protocol     Problem: Pain - Standard  Goal: Alleviation of pain or a reduction in pain to the patient’s comfort goal  Description: Target End Date:  Prior to discharge or change in level of care    Document on Vitals flowsheet    1.  Document pain using the appropriate pain scale per order or unit policy  2.  Educate and implement non-pharmacologic comfort measures (i.e. relaxation, distraction, massage, cold/heat therapy, etc.)  3.  Pain management medications as ordered  4.  Reassess pain after pain med administration per policy  5.  If opiods administered assess patient's response to pain medication is appropriate per POSS sedation scale  6.  Follow pain management plan developed in collaboration with patient and interdisciplinary team (including palliative care or pain specialists if applicable)  Outcome: Progressing  Note: Pain assessed and interventions given as indicated       Patient is not progressing towards the following goals:

## 2023-08-10 NOTE — CARE PLAN
"The patient is Watcher - Medium risk of patient condition declining or worsening    Shift Goals  Clinical Goals:  (extubate, monitor hemodynamics, keep hemodnyamics within established range)  Family Goals: for patient to get \"good solid rest\"    Progress made toward(s) clinical / shift goals:  Patient has extubated; patients hemodynamics have been monitored and his hemodynamics have remained within established range with the use of fluids.    Patient is not progressing towards the following goals: NA        Problem: Day of surgery post CABG/Heart valve replacement  Goal: Stabilization in immediate post op period  Intervention: VS q 15 min x 4 hours, then q 1 hour. Include temperature immediately upon arrival. Check CO/CI q 2-4 hours and PRN  Note: Patient's vital signs have been monitored continuously via the [employer] monitor. Temperature was checked upon arrival to the unit and a bear hugger was placed for a temperature of less than 36.5*C. Patient does not have a SWAN so CO/CI is NA.   Intervention: If radial artery used, elevate arm, no BP checks or needle sticks from affected arm, monitor ulnar pulse and capillary refill  Note: Left ulnar pulse +1, cap refill is sluggish in bilateral upper extremities. No needle sticks have been given in the upper left extremity.   Intervention: First post op hour labs and EKG per order  Note: First hour labs sent down to lab. This RN had to call down to the lab at about 1700 because platelet count had not resulted; lab is currently running platelet count. EKG obtained upon arrival to the unit.   Intervention: Serum K q 6 hours x 24 hours.  ABG and CBC prn.  Note: First potassium lab: 4.1; potassium replacement provided. 1800 potassium level has been sent down to the lab for processing.  Intervention: Initiate post cardiac insulin infusion protocol orders for FSBS greater than 140 and check frequency per protocol  Note: Insulin drip initiated for a blood sugar level of 192. "   Intervention: FSBS frequency as per Cardiac Surgery Insulin Drip Protocol  Note: Blood sugars have been checked q1 hour; there have been 3 results between 140 and 180, so now blood sugars are being checked q2 hours.   Intervention: Chest tube to 20 cm suction, record CT drainage with VS, and check for air leak  Note: Chest tube have been on -20cm; CT output has been checked q15 minutes for the first four hours, and is now being checked q 30 minutes. Patient has had 110ml of chest tube output so far. Chest tube is negative for airleak.   Intervention: For CT drainage >300 mL in first hour post op and/or 150 mL in subsequent hours: Stat platelets, PT, INR, TEG, iSTAT, and H&H per order  Note: NA, this situation has not occurred.   Intervention: Titrate and wean off vasoactive drips per patient's condition and per MD order while maintaining SBP  mmHg per MD order  Note: Patient has received 2 liters of plasmalyte but has not required vasoactive medications to keep SBP between .   Intervention: Wean from Vent per protocol (see protocol), extubation goal within 6 hours post op  Note: Patient extubated at 4 hours and 22 minutes.      Problem: Pain - Standard  Goal: Alleviation of pain or a reduction in pain to the patient’s comfort goal  Outcome: Progressing  Note: Patient shook his head whenever asked if he is in pain. While trying to get a NIF, patient's pain was limiting his ability to take a deep breath in, so he splinted his chest and was able to get a NIF of -29. It has been discussed with him and his wife that he needs to  his pain when he takes a deep breath in, and that the goal is to keep his pain under control and not to let it get out of control. Plan is to give patient a pain pill if his pain level falls within parameters for pain medication when patient is able to drink water/take medications. If he needs pain medication before then, then IV medication can be provided.

## 2023-08-10 NOTE — THERAPY
Occupational Therapy Contact Note    Patient Name: Jassi Rooney  Age:  53 y.o., Sex:  male  Medical Record #: [id number]  Today's Date: 8/10/2023         08/10/23 0758   Interdisciplinary Plan of Care Collaboration   Collaboration Comments OT referral received. Pt is POD #1 [facility]. Will complete. OT eval on or after POD #2.

## 2023-08-11 LAB
ANION GAP SERPL CALC-SCNC: 9 MMOL/L (ref 7–16)
BUN SERPL-MCNC: 14 MG/DL (ref 8–22)
CALCIUM SERPL-MCNC: 8.6 MG/DL (ref 8.5–10.5)
CHLORIDE SERPL-SCNC: 104 MMOL/L (ref 96–112)
CO2 SERPL-SCNC: 27 MMOL/L (ref 20–33)
CREAT SERPL-MCNC: 0.83 MG/DL (ref 0.5–1.4)
EKG IMPRESSION: NORMAL
ERYTHROCYTE [DISTWIDTH] IN BLOOD BY AUTOMATED COUNT: 43.8 FL (ref 35.9–50)
GFR SERPLBLD CREATININE-BSD FMLA CKD-EPI: 104 ML/MIN/1.73 M 2
GLUCOSE BLD STRIP.AUTO-MCNC: 117 MG/DL (ref 65–99)
GLUCOSE BLD STRIP.AUTO-MCNC: 122 MG/DL (ref 65–99)
GLUCOSE BLD STRIP.AUTO-MCNC: 127 MG/DL (ref 65–99)
GLUCOSE BLD STRIP.AUTO-MCNC: 160 MG/DL (ref 65–99)
GLUCOSE SERPL-MCNC: 152 MG/DL (ref 65–99)
HCT VFR BLD AUTO: 38.6 % (ref 42–52)
HGB BLD-MCNC: 13.3 G/DL (ref 14–18)
MCH RBC QN AUTO: 29.9 PG (ref 27–33)
MCHC RBC AUTO-ENTMCNC: 34.5 G/DL (ref 32.3–36.5)
MCV RBC AUTO: 86.7 FL (ref 81.4–97.8)
PLATELET # BLD AUTO: 208 K/UL (ref 164–446)
PMV BLD AUTO: 10.1 FL (ref 9–12.9)
POTASSIUM SERPL-SCNC: 4.1 MMOL/L (ref 3.6–5.5)
RBC # BLD AUTO: 4.45 M/UL (ref 4.7–6.1)
SODIUM SERPL-SCNC: 140 MMOL/L (ref 135–145)
WBC # BLD AUTO: 27.8 K/UL (ref 4.8–10.8)

## 2023-08-11 PROCEDURE — 700102 HCHG RX REV CODE 250 W/ 637 OVERRIDE(OP): Performed by: NURSE PRACTITIONER

## 2023-08-11 PROCEDURE — 94669 MECHANICAL CHEST WALL OSCILL: CPT

## 2023-08-11 PROCEDURE — 97165 OT EVAL LOW COMPLEX 30 MIN: CPT

## 2023-08-11 PROCEDURE — 700111 HCHG RX REV CODE 636 W/ 250 OVERRIDE (IP): Mod: JZ | Performed by: NURSE PRACTITIONER

## 2023-08-11 PROCEDURE — A9270 NON-COVERED ITEM OR SERVICE: HCPCS | Performed by: NURSE PRACTITIONER

## 2023-08-11 PROCEDURE — 99024 POSTOP FOLLOW-UP VISIT: CPT | Performed by: NURSE PRACTITIONER

## 2023-08-11 PROCEDURE — 97535 SELF CARE MNGMENT TRAINING: CPT

## 2023-08-11 PROCEDURE — 93010 ELECTROCARDIOGRAM REPORT: CPT | Performed by: INTERNAL MEDICINE

## 2023-08-11 PROCEDURE — 85027 COMPLETE CBC AUTOMATED: CPT

## 2023-08-11 PROCEDURE — 82962 GLUCOSE BLOOD TEST: CPT

## 2023-08-11 PROCEDURE — 770020 HCHG ROOM/CARE - TELE (206)

## 2023-08-11 PROCEDURE — 80048 BASIC METABOLIC PNL TOTAL CA: CPT

## 2023-08-11 RX ADMIN — SENNOSIDES AND DOCUSATE SODIUM 2 TABLET: 50; 8.6 TABLET ORAL at 06:19

## 2023-08-11 RX ADMIN — POTASSIUM CHLORIDE 40 MEQ: 1500 TABLET, EXTENDED RELEASE ORAL at 06:21

## 2023-08-11 RX ADMIN — METOPROLOL TARTRATE 25 MG: 25 TABLET, FILM COATED ORAL at 17:54

## 2023-08-11 RX ADMIN — ATORVASTATIN CALCIUM 20 MG: 20 TABLET, FILM COATED ORAL at 17:54

## 2023-08-11 RX ADMIN — DIPHENHYDRAMINE HYDROCHLORIDE 25 MG: 25 TABLET ORAL at 00:34

## 2023-08-11 RX ADMIN — LISINOPRIL 20 MG: 20 TABLET ORAL at 06:22

## 2023-08-11 RX ADMIN — OMEPRAZOLE 20 MG: 20 CAPSULE, DELAYED RELEASE ORAL at 06:19

## 2023-08-11 RX ADMIN — ASPIRIN 81 MG: 81 TABLET, COATED ORAL at 06:19

## 2023-08-11 RX ADMIN — Medication 1 APPLICATOR: at 09:56

## 2023-08-11 RX ADMIN — FUROSEMIDE 40 MG: 10 INJECTION INTRAMUSCULAR; INTRAVENOUS at 16:55

## 2023-08-11 RX ADMIN — INSULIN HUMAN 2 UNITS: 100 INJECTION, SOLUTION PARENTERAL at 07:38

## 2023-08-11 RX ADMIN — FUROSEMIDE 40 MG: 10 INJECTION INTRAMUSCULAR; INTRAVENOUS at 06:19

## 2023-08-11 RX ADMIN — METOPROLOL TARTRATE 25 MG: 25 TABLET, FILM COATED ORAL at 06:22

## 2023-08-11 RX ADMIN — Medication 1 APPLICATOR: at 20:29

## 2023-08-11 RX ADMIN — ACETAMINOPHEN 1000 MG: 500 TABLET, FILM COATED ORAL at 06:19

## 2023-08-11 RX ADMIN — MAGNESIUM SULFATE 1 G: 1 INJECTION INTRAVENOUS at 06:28

## 2023-08-11 RX ADMIN — ACETAMINOPHEN 1000 MG: 500 TABLET, FILM COATED ORAL at 11:10

## 2023-08-11 RX ADMIN — ENOXAPARIN SODIUM 40 MG: 100 INJECTION SUBCUTANEOUS at 17:53

## 2023-08-11 RX ADMIN — POLYETHYLENE GLYCOL 3350 1 PACKET: 17 POWDER, FOR SOLUTION ORAL at 06:19

## 2023-08-11 RX ADMIN — POTASSIUM CHLORIDE 40 MEQ: 1500 TABLET, EXTENDED RELEASE ORAL at 17:54

## 2023-08-11 RX ADMIN — ACETAMINOPHEN 1000 MG: 500 TABLET, FILM COATED ORAL at 17:54

## 2023-08-11 ASSESSMENT — COGNITIVE AND FUNCTIONAL STATUS - GENERAL
DAILY ACTIVITIY SCORE: 23
SUGGESTED CMS G CODE MODIFIER MOBILITY: CH
SUGGESTED CMS G CODE MODIFIER DAILY ACTIVITY: CH
SUGGESTED CMS G CODE MODIFIER DAILY ACTIVITY: CI
HELP NEEDED FOR BATHING: A LITTLE
MOBILITY SCORE: 24
DAILY ACTIVITIY SCORE: 24

## 2023-08-11 ASSESSMENT — PATIENT HEALTH QUESTIONNAIRE - PHQ9
SUM OF ALL RESPONSES TO PHQ9 QUESTIONS 1 AND 2: 0
1. LITTLE INTEREST OR PLEASURE IN DOING THINGS: NOT AT ALL
2. FEELING DOWN, DEPRESSED, IRRITABLE, OR HOPELESS: NOT AT ALL

## 2023-08-11 ASSESSMENT — PAIN DESCRIPTION - PAIN TYPE
TYPE: ACUTE PAIN;SURGICAL PAIN
TYPE: SURGICAL PAIN
TYPE: SURGICAL PAIN
TYPE: ACUTE PAIN;SURGICAL PAIN
TYPE: SURGICAL PAIN
TYPE: SURGICAL PAIN

## 2023-08-11 ASSESSMENT — ACTIVITIES OF DAILY LIVING (ADL): TOILETING: INDEPENDENT

## 2023-08-11 ASSESSMENT — FIBROSIS 4 INDEX: FIB4 SCORE: 0.65

## 2023-08-11 NOTE — THERAPY
"Occupational Therapy   Initial Evaluation     Patient Name: Jassi Rooney  Age:  53 y.o., Sex:  male  Medical Record #: [id number]  Today's Date: 8/11/2023     Precautions: Fall Risk    Assessment    Patient is 53 y.o. male with h/o HTN, high cholesterol, bicuspid AS, admitted for elective AVR, RISHABH. Pt seen for OT eval and treatment. See grid below for details. Treatment included education as outlined in Education Group (sternal & cardiac precautions, activity modification). Pt is mobilizing very well and completing BADL with no more than supv in this setting. He has good family support and shower chair in place if needed. No further acute OT needs at this time.     Plan    Occupational Therapy Initial Treatment Plan   Duration: Evaluation only    DC Equipment Recommendations: None  Discharge Recommendations: Anticipate that the patient will have no further occupational therapy needs after discharge from the hospital     Subjective    \"I would get winded after climbing 3 flights of stairs.\"     Objective       08/11/23 0921   Prior Living Situation   Prior Services None;Home-Independent   Housing / Facility 1 Story House   Steps Into Home 3   Steps In Home 0   Bathroom Set up Walk In Shower;Shower Chair   Equipment Owned Tub / Shower Seat   Lives with - Patient's Self Care Capacity Spouse;Child Less than 18 Years of Age   Comments Pt lives with spouse and 3 children. Reports spouse can assist as needed on DC.   Prior Level of ADL Function   Comments Pt was independent with BADL PTA   Prior Level of IADL Function   Prior Level Of Mobility Independent Without Device in Community;Independent Without Device in Home   Driving / Transportation Driving Independent   Occupation (Pre-Hospital Vocational) Employed Full Time  ([employer])   Leisure Interests Pets  (multiple animals at home)   Comments Pt stays in camper on work days and goes home on the weekends   Cognition    Cognition / Consciousness WDL   Level of " Consciousness Alert   Comments very pleasant & cooperative, highly motivated   Active ROM Upper Body   Active ROM Upper Body  WDL   Dominant Hand Right   Comments unilateral SF due to sternal prec   Strength Upper Body   Comments proximal resistance deferred due to sternal prec   Coordination Upper Body   Coordination WDL   Balance Assessment   Sitting Balance (Static) Good   Sitting Balance (Dynamic) Good   Standing Balance (Static) Good   Standing Balance (Dynamic) Fair +   Weight Shift Sitting Good   Weight Shift Standing Good   Comments no AD, no LOB   Bed Mobility    Supine to Sit Standby Assist   Sit to Supine Standby Assist   Scooting Supervised  (seated)   Comments flat bed, no rails   ADL Assessment   Grooming Standing;Modified Independent  (shaving face and head at sink)   Lower Body Dressing Supervision  (doff/don B socks in tailor sit)   Toileting Supervision  (toilet transfer, simulated hygiene)   Functional Mobility   Sit to Stand Supervised   Bed, Chair, Wheelchair Transfer Supervised   Toilet Transfers Supervised   Transfer Method Stand Step  (no AD)   Mobility Supine > < EOB, gait on unit and transfers   Education Group   Cardiac Precautions Patient Response Patient;Acceptance;Explanation;Handout;Verbal Demonstration  (pacing strategies, rest breaks as needed)   Sternal Precautions Patient Response Patient;Acceptance;Explanation;Demonstration;Handout;Verbal Demonstration;Action Demonstration  (lifting restriction; avoid shoulder extension, push/pull, bilateral shoulder flexion; lifting restriction)

## 2023-08-11 NOTE — CARE PLAN
"  Problem: Fall Risk  Goal: Patient will remain free from falls  Outcome: Progressing     Problem: Post Op Day 1 CABG/Heart Valve Replacement  Goal: Optimal care of the post op CABG/heart valve replacement Post Op Day 1  Intervention: EKG and CXR completed  Note: completed  Intervention: All valve patients: PT/INR daily  Note: no  Intervention: Antibiotics are discontinued within 24 hours of anesthesia end time unless indication documented for continuation beyond 24 hours  Note: completed  Intervention: Daily weights in the morning  Note: completed  Intervention: Up in chair for all meals  Note: completed  Intervention: Ambulate in am if stable. First ambulation 25 feet. Repeat x 3 as tolerated  Note: Pt walked x3  Intervention: Discontinue schulz catheter unless documented reason for continuation  Note: completed  Intervention: Assess surgical dressing and check provider orders for potential removal  Note: Removed and washed with soap and water  Intervention: [facility] trained RN to remove chest tubes if ordered by provider  Note: completed  Intervention: IS q 1 hour while awake and record best IS volume  Note: 1100  Intervention: Knee high RONNI hose, on during the day, off at night  Note: completed  Intervention: Saline lock IV  Note: completed  Intervention: Transfer to [facility], begin VS q 4 hours  Note: NA  Intervention: After 24th hour post-anesthesia end time, transition patient to Cardiac Surgery SQ Insulin Protocol  Note: completed  Intervention: If patient is CABG or on home beta-blocker, start/resume beta-blocker on POD 1 or POD 2 or document contraindication  Note: Pt on metoprolol   The patient is Watcher - Medium risk of patient condition declining or worsening    Shift Goals  Clinical Goals:  (extubate, monitor hemodynamics, keep hemodnyamics within established range)  Family Goals: for patient to get \"good solid rest\"    Progress made toward(s) clinical / shift goals:  Pt got rest, nausea resolved and " ambulated    Patient is not progressing towards the following goals: Pt remains hypertensive with intermittent ST

## 2023-08-11 NOTE — PROGRESS NOTES
Monitor summary:  Sinus rhythm to sinus tachycardia with rates from the 80s to 120s  .15/.07/.36

## 2023-08-11 NOTE — CARE PLAN
Problem: Hyperinflation  Goal: Prevent or improve atelectasis  Description: Target End Date:  3 to 4 days    1. Instruct incentive spirometry usage  2.  Perform hyperinflation therapy as indicated  Outcome: Progressing   PEP BID   IS 2000

## 2023-08-11 NOTE — PROGRESS NOTES
Report received from CIC RN, assumed care of pt. Pt A&Ox4. Plan of care discussed with pt, labs and chart reviewed. All needs met at this time. Tele box on. On room air. Call light within reach, bed locked and in lowest position. All fall precautions and hourly rounding in place.

## 2023-08-11 NOTE — PROGRESS NOTES
4 Eyes Skin Assessment Completed by MACARIO Bean and MACARIO Velasquez.    Head WDL  Ears WDL  Nose WDL  Mouth WDL  Neck WDL  Breast/Chest Incision  Shoulder Blades WDL  Spine WDL  (R) Arm/Elbow/Hand WDL  (L) Arm/Elbow/Hand WDL  Abdomen Incision  Groin WDL  Scrotum/Coccyx/Buttocks WDL  (R) Leg WDL  (L) Leg WDL  (R) Heel/Foot/Toe WDL  (L) Heel/Foot/Toe WDL          Devices In Places Tele Box, Pulse Ox, Central Line, and RONNI's      Interventions In Place Pillows    Possible Skin Injury No    Pictures Uploaded Into Epic N/A  Wound Consult Placed N/A  RN Wound Prevention Protocol Ordered No

## 2023-08-11 NOTE — CARE PLAN
The patient is Stable - Low risk of patient condition declining or worsening    Shift Goals  Clinical Goals: Wean supplemental O2, transition to tele status  Patient Goals: go home  Family Goals: MADISYN    Progress made toward(s) clinical / shift goals:    Problem: Post Op Day 1 CABG/Heart Valve Replacement  Goal: Optimal care of the post op CABG/heart valve replacement Post Op Day 1  Outcome: Progressing  Intervention: EKG and CXR completed  Note: Completed on day shift  Intervention: All valve patients: PT/INR daily  Note: Not completed per MICAH Purcell.  Intervention: Antibiotics are discontinued within 24 hours of anesthesia end time unless indication documented for continuation beyond 24 hours  Note: Completed  Intervention: Daily weights in the morning  Note: Completed by NOC RN on 8/10 in AM  Intervention: Up in chair for all meals  Note: Pt was up in chair for all meals  Intervention: Ambulate in am if stable. First ambulation 25 feet. Repeat x 3 as tolerated  Note: Pt was able to ambulate x 4 as well as stand multiple times throughout the day.  Intervention: Discontinue schulz catheter unless documented reason for continuation  Note: Schulz removed by day shift RN  Intervention: Assess surgical dressing and check provider orders for potential removal  Note: Island dressing removed, midline incision open to air  Intervention: Ensure referal to intensive cardiac rehab is ordered, and smoking cessation education if appropriate  Note: Completed  Intervention: [facility] trained RN to remove chest tubes if ordered by provider  Note: Chest tubes removed on day shift  Intervention: IS q 1 hour while awake and record best IS volume  Note: Pt is utilizing IS q 1 hour when awake, best volume: 1500 mL  Intervention: Knee high RONNI hose, on during the day, off at night  Note: Completed, RONNI hose on during day and SCDs on at night  Intervention: Saline lock IV  Note: All IVs are saline locked  Intervention: Transfer to tele status,  begin VS q 4 hours  Note: To be completed by APRN  Intervention: After 24th hour post-anesthesia end time, transition patient to Cardiac Surgery SQ Insulin Protocol  Note: Pt on Cardiac surgery SQ Protocol  Intervention: If patient is CABG or on home beta-blocker, start/resume beta-blocker on POD 1 or POD 2 or document contraindication  Note: Pt is taking metoprolol     Problem: Pain - Standard  Goal: Alleviation of pain or a reduction in pain to the patient’s comfort goal  Outcome: Progressing  Note: Pt's pain is being well managed with oral analgesics       Patient is not progressing towards the following goals:

## 2023-08-11 NOTE — CARE PLAN
Problem: Post op day 2 CABG/Heart Valve Replacement  Goal: Optimal care of the post op CABG/heart valve replacement post op day 2  Outcome: Progressing  Intervention: FSBS: when 2 consecutive BS < 130 after post op day 2, discontinue FSBS unless patient is insulin dependent diabetic  Note: Patient is diabetic and FSBS are being regular done before meals and at bed time. Patients blood sugars have been in normal range.   Intervention: Ambulate 4 times daily, increasing the distance each time  Note: Patient ambulated in the hallway X4 during the day. Patient enjoys ambulating and does not require a FWW.   Intervention: Stand at sink and wash up with assistance.  Clean incisions twice daily with soap and water.  Note: Patient's incision cleaned with foam cleanser and wash cloth.  Intervention: IS q 1 hour while awake and record best IS volume  Note: Patient is using IS without being prompted. Patient is getting to 1500. Baseline is 2150.  Intervention: Consider pacer wire removal by MD  Note: Pacer wires capped and inserted.   Intervention: Consider removal of schulz and chest tube if not already done  Note: Schulz and chest tubes removed 8/11. Incisions look good.    The patient is Watcher - Medium risk of patient condition declining or worsening    Shift Goals  Clinical Goals: Transition to Tele, Stable hemodynamics  Patient Goals: Go Home  Family Goals: MADISYN

## 2023-08-11 NOTE — PROGRESS NOTES
Cardiovascular Surgery Progress Note    Name: Jassi Rooney  MRN: [id number]  : 1970  Admit Date: 2023  5:01 AM  2 Days Post-Op     Procedure:  Procedure(s) and Anesthesia Type:     * AORTIC VALVE REPLACEMENT, - General     * ECHOCARDIOGRAM, TRANSESOPHAGEAL, INTRAOPERATIVE - General    Vitals:  Vitals:    23 0622 23 0700 23 0705 23 0800   BP: (!) 148/83 (!) 154/92  (!) 146/86   Pulse: 92 92 94 72   Resp: 19  18 18   Temp:    37.2 °C (99 °F)   TempSrc:    Temporal   SpO2: 90% 93% 96% 93%   Weight:       Height:          Temp (24hrs), Av.2 °C (99 °F), Min:37.2 °C (98.9 °F), Max:37.3 °C (99.2 °F)      Respiratory:  Room air   Respiration: 18, Pulse Oximetry: 93 %       Fluids:    Intake/Output Summary (Last 24 hours) at 2023 0947  Last data filed at 2023 0800  Gross per 24 hour   Intake 1912.3 ml   Output 4355 ml   Net -2442.7 ml       Admit weight: Weight: 97.3 kg (214 lb 8.1 oz)  Current weight: Weight: 98.2 kg (216 lb 7.9 oz) (23 0600)    Labs:  Recent Labs     23  1107 23  1232 08/10/23  0023 23  0028   WBC 8.0  --  22.2* 27.8*   RBC 5.42  --  4.77 4.45*   HEMOGLOBIN 16.0 14.5 14.1 13.3*   HEMATOCRIT 45.9 40.7* 40.2* 38.6*   MCV 84.7  --  84.3 86.7   MCH 29.5  --  29.6 29.9   MCHC 34.9  --  35.1 34.5   RDW 40.2  --  41.3 43.8   PLATELETCT 265 202 207 208   MPV 9.7  --  10.0 10.1       Recent Labs     23  1107 23  1232 08/10/23  0023 08/10/23  0600 23  0028   SODIUM 139  --  141  --  140   POTASSIUM 4.1   < > 4.1 4.1 4.1   CHLORIDE 104  --  110  --  104   CO2 25  --  20  --  27   GLUCOSE 85  --  133*  --  152*   BUN 12  --  13  --  14   CREATININE 0.89  --  0.72  --  0.83   CALCIUM 9.3  --  7.7*  --  8.6    < > = values in this interval not displayed.       Recent Labs     23  1107 23  1232   APTT 31.7 34.5   INR 0.97 1.34*             Medications:  Scheduled Medications   Medication Dose Frequency    insulin  regular  2-9 Units 4X/DAY ACHS    furosemide  40 mg BID DIURETIC    potassium chloride SA  40 mEq BID    lisinopril  20 mg DAILY    atorvastatin  20 mg Q EVENING    amLODIPine  5 mg Q DAY    enoxaparin (LOVENOX) injection  40 mg DAILY AT 1800    Nozin nasal  swab  1 Applicator BID    aspirin  81 mg DAILY    acetaminophen  1,000 mg Q6HRS    senna-docusate  2 Tablet BID    And    polyethylene glycol/lytes  1 Packet DAILY    And    magnesium hydroxide  30 mL DAILY    omeprazole  20 mg DAILY    metoprolol tartrate  25 mg BID        Exam:   Physical Exam  Vitals and nursing note reviewed.   Constitutional:       General: He is not in acute distress.     Appearance: Normal appearance.   HENT:      Head: Normocephalic.      Right Ear: External ear normal.      Left Ear: External ear normal.      Nose: Nose normal. No congestion.      Mouth/Throat:      Mouth: Mucous membranes are moist.      Pharynx: Oropharynx is clear.   Eyes:      Extraocular Movements: Extraocular movements intact.      Pupils: Pupils are equal, round, and reactive to light.   Cardiovascular:      Rate and Rhythm: Normal rate and regular rhythm.      Pulses: Normal pulses.      Heart sounds: Normal heart sounds.   Pulmonary:      Effort: Pulmonary effort is normal.      Breath sounds: Decreased breath sounds present.   Abdominal:      General: Bowel sounds are decreased. There is no distension.      Palpations: Abdomen is soft.   Musculoskeletal:      Cervical back: Normal range of motion and neck supple. No tenderness.      Right lower leg: Edema present.      Left lower leg: Edema present.   Skin:     General: Skin is warm and dry.      Comments: Midline surgical incision   Neurological:      General: No focal deficit present.      Mental Status: He is alert and oriented to person, place, and time. Mental status is at baseline.   Psychiatric:         Mood and Affect: Mood normal.         Behavior: Behavior normal.         Thought Content:  Thought content normal.         Cardiac Medications:    ASA - Yes    Plavix - No; contraindicated because of Other no CAD    Post-operative Beta Blockers - Yes    Ace/ARB- No; contraindicated because of Normal EF    Statin - No; contraindicated because of No CAD    Aldactone- No; contraindicated because of Normal EF    SGLT2i-  No contraindicated because of cost prohibitive    Ejection Fraction:  60%    Telemetry:   8/10 SR 60-70s  8/11 SR/ST    Assessment/Plan:  POD 1  HDS, SR, neuro intact, wounds intact, abdomen soft, fluid balance positive, wt up, 2 L NC. 150 mL out of chest tubes overnight. Nausea and vomiting this AM not relieved by ondansetron or prochlorperazine. Plan:  Dc mediastinal chest tubes and schulz. Diurese. One time dose of decadron for post op nausea. IS/ambulate.     POD 2  HDS- SBP 140s- pt refused amlodipine this AM, SR/ST, neuro intact, wounds intact, abdomen soft, fluid balance positive- good UOP yesterday with lasix, wt stable, room air.  Plan:  Continue diuresis, IS/ambulate. Transfer to [facility].     Disposition:  TBD

## 2023-08-11 NOTE — PROGRESS NOTES
Patient labs, vitals and discussed with bedside nurse. Patient is doing excellent post cardiac surgery. Will sign off please reach out for any questions or concerns.     Martin Austin MD  Critical Care Medicine

## 2023-08-12 LAB
ANION GAP SERPL CALC-SCNC: 9 MMOL/L (ref 7–16)
BUN SERPL-MCNC: 14 MG/DL (ref 8–22)
CALCIUM SERPL-MCNC: 8.3 MG/DL (ref 8.5–10.5)
CHLORIDE SERPL-SCNC: 105 MMOL/L (ref 96–112)
CO2 SERPL-SCNC: 27 MMOL/L (ref 20–33)
CREAT SERPL-MCNC: 0.78 MG/DL (ref 0.5–1.4)
ERYTHROCYTE [DISTWIDTH] IN BLOOD BY AUTOMATED COUNT: 44.5 FL (ref 35.9–50)
GFR SERPLBLD CREATININE-BSD FMLA CKD-EPI: 106 ML/MIN/1.73 M 2
GLUCOSE SERPL-MCNC: 103 MG/DL (ref 65–99)
HCT VFR BLD AUTO: 35.2 % (ref 42–52)
HGB BLD-MCNC: 11.9 G/DL (ref 14–18)
MCH RBC QN AUTO: 29.6 PG (ref 27–33)
MCHC RBC AUTO-ENTMCNC: 33.8 G/DL (ref 32.3–36.5)
MCV RBC AUTO: 87.6 FL (ref 81.4–97.8)
PLATELET # BLD AUTO: 157 K/UL (ref 164–446)
PMV BLD AUTO: 9.8 FL (ref 9–12.9)
POTASSIUM SERPL-SCNC: 4.1 MMOL/L (ref 3.6–5.5)
RBC # BLD AUTO: 4.02 M/UL (ref 4.7–6.1)
SODIUM SERPL-SCNC: 141 MMOL/L (ref 135–145)
WBC # BLD AUTO: 15.4 K/UL (ref 4.8–10.8)

## 2023-08-12 PROCEDURE — 80048 BASIC METABOLIC PNL TOTAL CA: CPT

## 2023-08-12 PROCEDURE — 700102 HCHG RX REV CODE 250 W/ 637 OVERRIDE(OP): Performed by: NURSE PRACTITIONER

## 2023-08-12 PROCEDURE — 94669 MECHANICAL CHEST WALL OSCILL: CPT

## 2023-08-12 PROCEDURE — 700111 HCHG RX REV CODE 636 W/ 250 OVERRIDE (IP): Mod: JZ | Performed by: NURSE PRACTITIONER

## 2023-08-12 PROCEDURE — 770020 HCHG ROOM/CARE - TELE (206)

## 2023-08-12 PROCEDURE — 85027 COMPLETE CBC AUTOMATED: CPT

## 2023-08-12 PROCEDURE — A9270 NON-COVERED ITEM OR SERVICE: HCPCS | Performed by: NURSE PRACTITIONER

## 2023-08-12 PROCEDURE — 97163 PT EVAL HIGH COMPLEX 45 MIN: CPT

## 2023-08-12 PROCEDURE — 99024 POSTOP FOLLOW-UP VISIT: CPT | Performed by: NURSE PRACTITIONER

## 2023-08-12 RX ORDER — GUAIFENESIN 600 MG/1
600 TABLET, EXTENDED RELEASE ORAL EVERY 12 HOURS
Status: DISCONTINUED | OUTPATIENT
Start: 2023-08-12 | End: 2023-08-13 | Stop reason: HOSPADM

## 2023-08-12 RX ORDER — BENZONATATE 100 MG/1
100 CAPSULE ORAL 3 TIMES DAILY PRN
Status: DISCONTINUED | OUTPATIENT
Start: 2023-08-12 | End: 2023-08-13 | Stop reason: HOSPADM

## 2023-08-12 RX ADMIN — ACETAMINOPHEN 1000 MG: 500 TABLET, FILM COATED ORAL at 05:42

## 2023-08-12 RX ADMIN — POTASSIUM CHLORIDE 40 MEQ: 1500 TABLET, EXTENDED RELEASE ORAL at 17:03

## 2023-08-12 RX ADMIN — FUROSEMIDE 40 MG: 10 INJECTION INTRAMUSCULAR; INTRAVENOUS at 05:44

## 2023-08-12 RX ADMIN — LISINOPRIL 20 MG: 20 TABLET ORAL at 05:44

## 2023-08-12 RX ADMIN — METOPROLOL TARTRATE 25 MG: 25 TABLET, FILM COATED ORAL at 17:01

## 2023-08-12 RX ADMIN — METOPROLOL TARTRATE 25 MG: 25 TABLET, FILM COATED ORAL at 05:44

## 2023-08-12 RX ADMIN — FUROSEMIDE 40 MG: 10 INJECTION INTRAMUSCULAR; INTRAVENOUS at 16:56

## 2023-08-12 RX ADMIN — ASPIRIN 81 MG: 81 TABLET, COATED ORAL at 05:44

## 2023-08-12 RX ADMIN — Medication 1 APPLICATOR: at 20:04

## 2023-08-12 RX ADMIN — SENNOSIDES AND DOCUSATE SODIUM 2 TABLET: 50; 8.6 TABLET ORAL at 17:02

## 2023-08-12 RX ADMIN — OMEPRAZOLE 20 MG: 20 CAPSULE, DELAYED RELEASE ORAL at 05:44

## 2023-08-12 RX ADMIN — POTASSIUM CHLORIDE 40 MEQ: 1500 TABLET, EXTENDED RELEASE ORAL at 05:44

## 2023-08-12 RX ADMIN — ENOXAPARIN SODIUM 40 MG: 100 INJECTION SUBCUTANEOUS at 17:03

## 2023-08-12 RX ADMIN — ATORVASTATIN CALCIUM 20 MG: 20 TABLET, FILM COATED ORAL at 17:01

## 2023-08-12 RX ADMIN — ACETAMINOPHEN 1000 MG: 500 TABLET, FILM COATED ORAL at 12:35

## 2023-08-12 RX ADMIN — ACETAMINOPHEN 1000 MG: 500 TABLET, FILM COATED ORAL at 00:35

## 2023-08-12 RX ADMIN — ACETAMINOPHEN 1000 MG: 500 TABLET, FILM COATED ORAL at 17:02

## 2023-08-12 RX ADMIN — Medication 1 APPLICATOR: at 09:00

## 2023-08-12 RX ADMIN — AMLODIPINE BESYLATE 5 MG: 10 TABLET ORAL at 05:44

## 2023-08-12 RX ADMIN — GUAIFENESIN 600 MG: 600 TABLET, EXTENDED RELEASE ORAL at 22:15

## 2023-08-12 ASSESSMENT — PATIENT HEALTH QUESTIONNAIRE - PHQ9
1. LITTLE INTEREST OR PLEASURE IN DOING THINGS: NOT AT ALL
SUM OF ALL RESPONSES TO PHQ9 QUESTIONS 1 AND 2: 0
2. FEELING DOWN, DEPRESSED, IRRITABLE, OR HOPELESS: NOT AT ALL
2. FEELING DOWN, DEPRESSED, IRRITABLE, OR HOPELESS: NOT AT ALL
SUM OF ALL RESPONSES TO PHQ9 QUESTIONS 1 AND 2: 0
1. LITTLE INTEREST OR PLEASURE IN DOING THINGS: NOT AT ALL

## 2023-08-12 ASSESSMENT — GAIT ASSESSMENTS
DEVIATION: NO DEVIATION
GAIT LEVEL OF ASSIST: SUPERVISED
DISTANCE (FEET): 300

## 2023-08-12 ASSESSMENT — COGNITIVE AND FUNCTIONAL STATUS - GENERAL
SUGGESTED CMS G CODE MODIFIER MOBILITY: CH
MOBILITY SCORE: 24

## 2023-08-12 ASSESSMENT — PAIN DESCRIPTION - PAIN TYPE
TYPE: SURGICAL PAIN
TYPE: SURGICAL PAIN

## 2023-08-12 ASSESSMENT — FIBROSIS 4 INDEX
FIB4 SCORE: 0.86
FIB4 SCORE: 0.86

## 2023-08-12 NOTE — THERAPY
Physical Therapy   Initial Evaluation     Patient Name: Jassi Rooney  Age:  53 y.o., Sex:  male  Medical Record #: [id number]  Today's Date: 8/12/2023     Precautions  Precautions: Fall Risk;Sternal Precautions (See Comments)  Comments: S/P AVR    Assessment  Patient is 53 y.o. male S/P AORTIC VALVE REPLACEMENT  H/o HTN, high cholesterol, bicuspid AS, admitted for elective AVR, RISHABH.  Pt doing well with all mobility, supervised level. No SOB, VSS. Reviewed cardiac rehab handout. No further PT needs.     Plan    Physical Therapy Initial Treatment Plan   Duration: Evaluation only    DC Equipment Recommendations: None  Discharge Recommendations: Anticipate that the patient will have no further physical therapy needs after discharge from the hospital (Cardiac Rehab)       Subjective    Pt eager to get better, has already walked a lot in the halls this AM.      Objective       08/12/23 0851   Initial Contact Note    Initial Contact Note Order Received and Verified, Physical Therapy Evaluation in Progress with Full Report to Follow.   Precautions   Precautions Fall Risk;Sternal Precautions (See Comments)   Comments S/P AVR   Vitals   Pulse (!) 104  (after amb and stairs)   Pain 0 - 10 Group   Therapist Pain Assessment   (pt did not c/o pain during PT eval)   Prior Living Situation   Prior Services None;Home-Independent   Housing / Facility 1 Story House   Steps Into Home 3   Steps In Home 0   Lives with - Patient's Self Care Capacity Spouse;Child Less than 18 Years of Age   Comments Pt lives with spouse and 3 children. Reports spouse can assist as needed on DC.   Cognition    Cognition / Consciousness WDL   Level of Consciousness Alert   Active ROM Lower Body    Active ROM Lower Body  WDL   Strength Lower Body   Lower Body Strength  WDL   Coordination Lower Body    Coordination Lower Body  WDL   Vision   Vision Comments pt stated that his vision has improved   Other Treatments   Other Treatments Provided log  roll>amb>stair>education   Balance Assessment   Sitting Balance (Static) Good   Sitting Balance (Dynamic) Good   Standing Balance (Static) Good   Standing Balance (Dynamic) Good   Weight Shift Sitting Good   Weight Shift Standing Good   Comments no AD, no LOB   Bed Mobility    Supine to Sit Supervised   Sit to Supine Supervised   Scooting Supervised   Rolling Supervised   Comments f;at bed no rails, log roll, holding cardiac pillow   Gait Analysis   Gait Level Of Assist Supervised   Assistive Device None   Distance (Feet) 300   Deviation No deviation   # of Stairs Climbed 9   Level of Assist with Stairs Supervised   Functional Mobility   Sit to Stand Supervised   Bed, Chair, Wheelchair Transfer Supervised   Transfer Method Stand Step   Mobility no AD   How much difficulty does the patient currently have...   Turning over in bed (including adjusting bedclothes, sheets and blankets)? 4   Sitting down on and standing up from a chair with arms (e.g., wheelchair, bedside commode, etc.) 4   Moving from lying on back to sitting on the side of the bed? 4   How much help from another person does the patient currently need...   Moving to and from a bed to a chair (including a wheelchair)? 4   Need to walk in a hospital room? 4   Climbing 3-5 steps with a railing? 4   6 clicks Mobility Score 24   Activity Tolerance   Comments pt amb in halls prior to arrival, no SOB, no Signs or sxs of distress with activity   Education Group   Education Provided Sternal Precautions;Cardiac Precautions;Role of Physical Therapist;Gait Training  (cardiac handout, PRE, walking program, walk talk test, HR taking)   Cardiac Precautions Patient Response Patient;Acceptance;Explanation;Handout;Verbal Demonstration   Sternal Precautions Patient Response Patient;Acceptance;Explanation;Demonstration;Handout;Action Demonstration   Role of Physical Therapist Patient Response Patient;Acceptance;Explanation   Gait Training Patient Response  Patient;Acceptance;Explanation;Handout;Action Demonstration   Physical Therapy Initial Treatment Plan    Duration Evaluation only   Anticipated Discharge Equipment and Recommendations   DC Equipment Recommendations None   Discharge Recommendations Anticipate that the patient will have no further physical therapy needs after discharge from the hospital  (Cardiac Rehab)   Interdisciplinary Plan of Care Collaboration   IDT Collaboration with  Nursing   Patient Position at End of Therapy Other (Comments)  (pt independent in room)

## 2023-08-12 NOTE — CARE PLAN
The patient is Stable - Low risk of patient condition declining or worsening    Shift Goals  Clinical Goals: ambulate, incision care  Patient Goals: ambulate  Family Goals: MADISYN    Progress made toward(s) clinical / shift goals:    Problem: Knowledge Deficit - Standard  Goal: Patient and family/care givers will demonstrate understanding of plan of care, disease process/condition, diagnostic tests and medications  Outcome: Progressing     Problem: Skin Integrity  Goal: Skin integrity is maintained or improved  Outcome: Progressing   Pt incisions have been cleaned and assessed frequently.      Problem: Fall Risk  Goal: Patient will remain free from falls  Outcome: Progressing     Problem: Post Op Day 3 CABG/Heart Valve replacement  Goal: Optimal care of the post op CABG/Heart Valve replacement post op day 3  Outcome: Progressing     Problem: Hemodynamics  Goal: Patient's hemodynamics, fluid balance and neurologic status will be stable or improve  Outcome: Progressing     Problem: Respiratory  Goal: Patient will achieve/maintain optimum respiratory ventilation and gas exchange  Outcome: Progressing     Problem: Risk for Aspiration  Goal: Patient's risk for aspiration will be absent or decrease  Outcome: Progressing     Problem: Nutrition - Advanced  Goal: Patient will display progressive weight gain toward goal have adequate food and fluid intake  Outcome: Progressing     Problem: Self Care  Goal: Patient will have the ability to perform ADLs independently or with assistance (bathe, groom, dress, toilet and feed)  Outcome: Progressing   Pt was able to ambulate around the floor several times an hour and bathe independently.     Problem: Bowel Elimination - Post Surgical  Goal: Patient will resume regular bowel sounds and function with no discomfort or distention  Outcome: Progressing     Problem: Pain - Standard  Goal: Alleviation of pain or a reduction in pain to the patient’s comfort goal  Outcome: Progressing   Pt  expressed reduction of pain.

## 2023-08-12 NOTE — PROGRESS NOTES
Cardiovascular Surgery Progress Note    Name: Jassi Rooney  MRN: [id number]  : 1970  Admit Date: 2023  5:01 AM  3 Days Post-Op     Procedure:  Procedure(s) and Anesthesia Type:     * AORTIC VALVE REPLACEMENT, - General     * ECHOCARDIOGRAM, TRANSESOPHAGEAL, INTRAOPERATIVE - General    Vitals:  Vitals:    23 0018 23 0400 23 0527   BP: 91/71 124/86  124/85   Pulse: 84 92  99   Resp: 18 18  18   Temp: 36.9 °C (98.4 °F) 37.1 °C (98.8 °F)  37 °C (98.6 °F)   TempSrc: Temporal Temporal  Temporal   SpO2: 98% 96%  96%   Weight:   97.1 kg (214 lb 1.1 oz)    Height:          Temp (24hrs), Av.9 °C (98.5 °F), Min:36.5 °C (97.7 °F), Max:37.4 °C (99.3 °F)      Respiratory:  Room air   Respiration: 18, Pulse Oximetry: 96 %       Fluids:    Intake/Output Summary (Last 24 hours) at 2023 0725  Last data filed at 2023 0527  Gross per 24 hour   Intake 1280 ml   Output 1650 ml   Net -370 ml       Admit weight: Weight: 97.3 kg (214 lb 8.1 oz)  Current weight: Weight: 97.1 kg (214 lb 1.1 oz) (23 0400)    Labs:  Recent Labs     08/10/23  0023 23  0028 23  0130   WBC 22.2* 27.8* 15.4*   RBC 4.77 4.45* 4.02*   HEMOGLOBIN 14.1 13.3* 11.9*   HEMATOCRIT 40.2* 38.6* 35.2*   MCV 84.3 86.7 87.6   MCH 29.6 29.9 29.6   MCHC 35.1 34.5 33.8   RDW 41.3 43.8 44.5   PLATELETCT 207 208 157*   MPV 10.0 10.1 9.8       Recent Labs     08/10/23  0023 08/10/23  0600 23  0028 23  0130   SODIUM 141  --  140 141   POTASSIUM 4.1 4.1 4.1 4.1   CHLORIDE 110  --  104 105   CO2 20  --  27 27   GLUCOSE 133*  --  152* 103*   BUN 13  --  14 14   CREATININE 0.72  --  0.83 0.78   CALCIUM 7.7*  --  8.6 8.3*       Recent Labs     23  1232   APTT 34.5   INR 1.34*             Medications:  Scheduled Medications   Medication Dose Frequency    furosemide  40 mg BID DIURETIC    potassium chloride SA  40 mEq BID    lisinopril  20 mg DAILY    atorvastatin  20 mg Q EVENING    amLODIPine   5 mg Q DAY    enoxaparin (LOVENOX) injection  40 mg DAILY AT 1800    Nozin nasal  swab  1 Applicator BID    aspirin  81 mg DAILY    acetaminophen  1,000 mg Q6HRS    senna-docusate  2 Tablet BID    And    polyethylene glycol/lytes  1 Packet DAILY    And    magnesium hydroxide  30 mL DAILY    omeprazole  20 mg DAILY    metoprolol tartrate  25 mg BID        Exam:   Physical Exam  Vitals and nursing note reviewed.   Constitutional:       General: He is not in acute distress.     Appearance: Normal appearance.   HENT:      Head: Normocephalic.      Right Ear: External ear normal.      Left Ear: External ear normal.      Nose: Nose normal. No congestion.      Mouth/Throat:      Mouth: Mucous membranes are moist.      Pharynx: Oropharynx is clear.   Eyes:      Extraocular Movements: Extraocular movements intact.      Pupils: Pupils are equal, round, and reactive to light.   Cardiovascular:      Rate and Rhythm: Normal rate and regular rhythm.      Pulses: Normal pulses.      Heart sounds: Normal heart sounds.   Pulmonary:      Effort: Pulmonary effort is normal.      Breath sounds: Normal breath sounds.   Abdominal:      General: Abdomen is flat. Bowel sounds are normal. There is no distension.      Palpations: Abdomen is soft.   Musculoskeletal:      Cervical back: Normal range of motion and neck supple. No tenderness.   Skin:     General: Skin is warm and dry.      Comments: Midline surgical incision   Neurological:      General: No focal deficit present.      Mental Status: He is alert and oriented to person, place, and time. Mental status is at baseline.   Psychiatric:         Mood and Affect: Mood normal.         Behavior: Behavior normal.         Thought Content: Thought content normal.         Cardiac Medications:    ASA - Yes    Plavix - No; contraindicated because of Other no CAD    Post-operative Beta Blockers - Yes    Ace/ARB- No; contraindicated because of Normal EF    Statin - No; contraindicated  because of No CAD    Aldactone- No; contraindicated because of Normal EF    SGLT2i-  No contraindicated because of cost prohibitive    Ejection Fraction:  60%    Telemetry:   8/10 SR 60-70s  8/11 SR/ST  8/12 SR/ST    Assessment/Plan:  POD 1  HDS, SR, neuro intact, wounds intact, abdomen soft, fluid balance positive, wt up, 2 L NC. 150 mL out of chest tubes overnight. Nausea and vomiting this AM not relieved by ondansetron or prochlorperazine. Plan:  Dc mediastinal chest tubes and schulz. Diurese. One time dose of decadron for post op nausea. IS/ambulate.     POD 2  HDS- SBP 140s- pt refused amlodipine this AM, SR/ST, neuro intact, wounds intact, abdomen soft, fluid balance positive- good UOP yesterday with lasix, wt stable, room air.  Plan:  Continue diuresis, IS/ambulate. Transfer to [facility].     POD 3  HDS, SR/ST, neuro intact, wounds intact, abdomen soft +BM, fluid balance positive, wt stable,  room air.  Plan:  DC pacing wires. Continue diuresis, IS/ambulate. Anticipate dc home 1-2 days.     Disposition:  No needs

## 2023-08-12 NOTE — PROGRESS NOTES
Received bedside report from RN, pt care assumed, VSS, pt assessment complete. Pt AAOx4, with 0/10 of pain at this time. Tele monitor on. No signs of acute distress noted at this time. Plan of care discussed with pt and verbalizes no questions. Pt denies any additional needs at this time. Bed locked/in lowest position, pt educated on fall risk and verbalized understanding, call light within reach, hourly rounding initiated. Sternum site and chest tube sites clean and intact

## 2023-08-12 NOTE — CARE PLAN
The patient is Stable - Low risk of patient condition declining or worsening    Shift Goals  Clinical Goals: Ambulate, IS, incision care, monitor hemodynamics  Patient Goals: Ambulate, home  Family Goals: no family present    Progress made toward(s) clinical / shift goals:      Problem: Knowledge Deficit - Standard  Goal: Patient and family/care givers will demonstrate understanding of plan of care, disease process/condition, diagnostic tests and medications  Outcome: Progressing     Problem: Post op day 2 CABG/Heart Valve Replacement  Goal: Optimal care of the post op CABG/heart valve replacement post op day 2  Outcome: Progressing     Problem: Post Op Day 3 CABG/Heart Valve replacement  Goal: Optimal care of the post op CABG/Heart Valve replacement post op day 3  Outcome: Progressing     Problem: Self Care  Goal: Patient will have the ability to perform ADLs independently or with assistance (bathe, groom, dress, toilet and feed)  Outcome: Progressing     Problem: Skin Integrity  Goal: Skin integrity is maintained or improved  Outcome: Progressing       Patient verbalized an understanding of condition, treatment plan, and medications being administered. Patient has used IS 10x per hour when sitting in room, highest recorded was 2000. Patient ambulated 2x around unit (4 laps each time) without any assistive devices and tolerated activity well while on room air. Incision care was done and educated provided for how patient should complete while at home, all needs/questions addressed. Patient has had no pain throughout night and all vitals have remained stable. Patient able to complete all ADLs independently. Incision site is clean, dry, and intact

## 2023-08-12 NOTE — PROGRESS NOTES
Bedside report received from RN. Patient care assumed and assessment complete. Pt is A&O4, tele monitor on, 0/10 pain. No signs of acute distress noted at this time. Pt denies any additional needs at this time. Bed locked/lowest position, call light within reach, hourly rounding initiated.

## 2023-08-13 ENCOUNTER — PHARMACY VISIT (OUTPATIENT)
Dept: PHARMACY | Facility: MEDICAL CENTER | Age: 53
End: 2023-08-13
Payer: COMMERCIAL

## 2023-08-13 VITALS
DIASTOLIC BLOOD PRESSURE: 79 MMHG | HEART RATE: 94 BPM | WEIGHT: 208.78 LBS | SYSTOLIC BLOOD PRESSURE: 129 MMHG | OXYGEN SATURATION: 93 % | HEIGHT: 71 IN | BODY MASS INDEX: 29.23 KG/M2 | RESPIRATION RATE: 16 BRPM | TEMPERATURE: 98.1 F

## 2023-08-13 LAB
ANION GAP SERPL CALC-SCNC: 10 MMOL/L (ref 7–16)
BUN SERPL-MCNC: 16 MG/DL (ref 8–22)
CALCIUM SERPL-MCNC: 8.9 MG/DL (ref 8.5–10.5)
CHLORIDE SERPL-SCNC: 101 MMOL/L (ref 96–112)
CO2 SERPL-SCNC: 26 MMOL/L (ref 20–33)
CREAT SERPL-MCNC: 0.8 MG/DL (ref 0.5–1.4)
ERYTHROCYTE [DISTWIDTH] IN BLOOD BY AUTOMATED COUNT: 42.5 FL (ref 35.9–50)
GFR SERPLBLD CREATININE-BSD FMLA CKD-EPI: 105 ML/MIN/1.73 M 2
GLUCOSE SERPL-MCNC: 126 MG/DL (ref 65–99)
HCT VFR BLD AUTO: 38.4 % (ref 42–52)
HGB BLD-MCNC: 12.9 G/DL (ref 14–18)
MCH RBC QN AUTO: 29.1 PG (ref 27–33)
MCHC RBC AUTO-ENTMCNC: 33.6 G/DL (ref 32.3–36.5)
MCV RBC AUTO: 86.7 FL (ref 81.4–97.8)
PLATELET # BLD AUTO: 204 K/UL (ref 164–446)
PMV BLD AUTO: 9.7 FL (ref 9–12.9)
POTASSIUM SERPL-SCNC: 3.8 MMOL/L (ref 3.6–5.5)
RBC # BLD AUTO: 4.43 M/UL (ref 4.7–6.1)
SODIUM SERPL-SCNC: 137 MMOL/L (ref 135–145)
WBC # BLD AUTO: 11.3 K/UL (ref 4.8–10.8)

## 2023-08-13 PROCEDURE — 700102 HCHG RX REV CODE 250 W/ 637 OVERRIDE(OP): Performed by: NURSE PRACTITIONER

## 2023-08-13 PROCEDURE — RXMED WILLOW AMBULATORY MEDICATION CHARGE: Performed by: NURSE PRACTITIONER

## 2023-08-13 PROCEDURE — 80048 BASIC METABOLIC PNL TOTAL CA: CPT

## 2023-08-13 PROCEDURE — 99024 POSTOP FOLLOW-UP VISIT: CPT | Performed by: NURSE PRACTITIONER

## 2023-08-13 PROCEDURE — 85027 COMPLETE CBC AUTOMATED: CPT

## 2023-08-13 PROCEDURE — A9270 NON-COVERED ITEM OR SERVICE: HCPCS | Performed by: NURSE PRACTITIONER

## 2023-08-13 PROCEDURE — 700111 HCHG RX REV CODE 636 W/ 250 OVERRIDE (IP): Mod: JZ | Performed by: NURSE PRACTITIONER

## 2023-08-13 RX ORDER — POTASSIUM CHLORIDE 20 MEQ/1
20 TABLET, EXTENDED RELEASE ORAL DAILY
Qty: 30 TABLET | Refills: 0 | Status: SHIPPED | OUTPATIENT
Start: 2023-08-13 | End: 2023-08-30

## 2023-08-13 RX ORDER — AMLODIPINE BESYLATE 5 MG/1
5 TABLET ORAL DAILY
Qty: 30 TABLET | Refills: 2 | Status: SHIPPED | OUTPATIENT
Start: 2023-08-14 | End: 2023-11-28 | Stop reason: SDUPTHER

## 2023-08-13 RX ORDER — FUROSEMIDE 20 MG/1
20 TABLET ORAL DAILY
Qty: 30 TABLET | Refills: 0 | Status: SHIPPED | OUTPATIENT
Start: 2023-08-13 | End: 2023-08-30

## 2023-08-13 RX ORDER — ASPIRIN 81 MG/1
81 TABLET ORAL DAILY
Qty: 90 TABLET | Refills: 2 | Status: SHIPPED | OUTPATIENT
Start: 2023-08-14

## 2023-08-13 RX ORDER — HYDROCODONE BITARTRATE AND ACETAMINOPHEN 5; 325 MG/1; MG/1
1 TABLET ORAL EVERY 6 HOURS PRN
Qty: 56 TABLET | Refills: 0 | Status: SHIPPED | OUTPATIENT
Start: 2023-08-13 | End: 2023-08-27

## 2023-08-13 RX ORDER — AMOXICILLIN AND CLAVULANATE POTASSIUM 500; 125 MG/1; MG/1
1 TABLET, FILM COATED ORAL 3 TIMES DAILY
Qty: 30 TABLET | Refills: 0 | Status: ACTIVE | OUTPATIENT
Start: 2023-08-13 | End: 2023-08-23

## 2023-08-13 RX ORDER — ACETAMINOPHEN 500 MG
500 TABLET ORAL EVERY 6 HOURS PRN
COMMUNITY
Start: 2023-08-13

## 2023-08-13 RX ADMIN — ACETAMINOPHEN 1000 MG: 500 TABLET, FILM COATED ORAL at 01:21

## 2023-08-13 RX ADMIN — ACETAMINOPHEN 1000 MG: 500 TABLET, FILM COATED ORAL at 06:19

## 2023-08-13 RX ADMIN — OMEPRAZOLE 20 MG: 20 CAPSULE, DELAYED RELEASE ORAL at 06:19

## 2023-08-13 RX ADMIN — AMLODIPINE BESYLATE 5 MG: 10 TABLET ORAL at 06:18

## 2023-08-13 RX ADMIN — LISINOPRIL 20 MG: 20 TABLET ORAL at 06:19

## 2023-08-13 RX ADMIN — METOPROLOL TARTRATE 25 MG: 25 TABLET, FILM COATED ORAL at 06:19

## 2023-08-13 RX ADMIN — BENZONATATE 100 MG: 100 CAPSULE ORAL at 06:19

## 2023-08-13 RX ADMIN — FUROSEMIDE 40 MG: 10 INJECTION INTRAMUSCULAR; INTRAVENOUS at 06:19

## 2023-08-13 RX ADMIN — ASPIRIN 81 MG: 81 TABLET, COATED ORAL at 06:19

## 2023-08-13 RX ADMIN — POTASSIUM CHLORIDE 40 MEQ: 1500 TABLET, EXTENDED RELEASE ORAL at 06:19

## 2023-08-13 NOTE — PROGRESS NOTES
Received bedside report from RN, pt care assumed, VSS, pt assessment complete. Pt AAOx4, tele monitor on. No signs of acute distress noted at this time. Plan of care discussed with pt and verbalizes no questions. Pt denies any additional needs at this time. Bed locked/in lowest position, pt educated on fall risk and verbalized understanding, call light within reach, hourly rounding initiated. Family at bedside

## 2023-08-13 NOTE — DISCHARGE INSTRUCTIONS
DIVISION OF CARDIAC SURGERY   DISCHARGE INSTRUCTIONS    Activity:    NO driving for 4 weeks after surgery. You may ride as a passenger.  NO lifting, pushing, or pulling more than 10 pounds for 6 weeks.  For the next 6 weeks, keep your elbows close to your body and move within a pain-free motion when lifting, pushing or pulling.  Do not stretch both arms backwards at the same time.    Walk at least 4 times per day, there is no maximum. The goal is to increase your distance over time.  Continue using incentive spirometer for 2 weeks or until your baseline volume is reached.  If you are going home on oxygen and you were not on oxygen prior to surgery, keep using until you are oxygen free.  Weigh yourself daily.  Call your Cardiologist for a weight gain of 3 or more pounds in 1 day or more than 5 pounds in 7 days.  Take all of your medications as prescribed. Do not use a pill box for the first month at home. If you have questions, please call your nurse navigator at [phone].  Continue to wear the RONNI (compression) stockings for 2-4 weeks or until all swelling is gone. You may take them off when you are in bed or when your legs are elevated.    Incision Care:    Make sure to clean your incision(s) TWICE DAILY.  Once by showering AND once using the no rinse Foam cleanser provided in the hospital.  During the shower, cleanse the incision(s) with a perfume and dye free soap (Dial, Dove, [place])  Use gentle pressure and rub up and down over incision with your hands or a washcloth. Rinse off and pat incision(s) dry with clean towel.  Keep the incision open to air. No creams or lotions on your incision(s). No baths.  If there is any increased redness or swelling, separation of the incision line, or thick drainage from any of your incisions, call the Cardiac Surgeons ([phone]).      General Instructions:    You have been referred to Cardiac Rehab.  You can start Cardiac Rehab 30 days after surgery.  If you do  not have an appointment at the time of discharge call [phone] to schedule an appointment.  Your Primary Care Doctor typically handles home oxygen. Oxygen may be stopped when your oxygen level is consistently greater than 90.  Check with your Primary Care Doctor if you are unsure.  Take all of your medications (including pain medications) as prescribed.  Taking medications other than prescribed can result in serious injury.    For Patients Discharged with Narcotic Pain Medication:     If a refill is needed, understand that only 1 refill will be provided and you must come to the Cardiac Surgeons’ office for an appointment (72 hours’ notice is required to schedule and there are no weekend appointments).  If the pain medications you are discharged on are not working, you will need to bring your remaining prescription into the office in order to receive a new prescription.  If you were taking narcotics prior to your heart surgery, the Cardiac Surgeons will provide you with one prescription and additional medications will need to be provided by your pain management doctor.  Do not drink alcohol while taking narcotics.  Lost or stolen medications will not be refilled.  If medications are stolen, report to law enforcement.    Contact Cardiac Surgery at [phone] if you have any questions.

## 2023-08-13 NOTE — CARE PLAN
The patient is Stable - Low risk of patient condition declining or worsening    Shift Goals  Clinical Goals: ambulate, incision care, IS, monitor labs/vitals  Patient Goals: home  Family Goals: home, updates on POC    Progress made toward(s) clinical / shift goals:      Problem: Post Op Day 4 CABG/Heart Valve Replacement  Goal: Optimal care of the Post Op CABG/Heart Valve replacement Post Op Day 4  Outcome: Progressing     Problem: Knowledge Deficit - Standard  Goal: Patient and family/care givers will demonstrate understanding of plan of care, disease process/condition, diagnostic tests and medications  Outcome: Progressing     Problem: Skin Integrity  Goal: Skin integrity is maintained or improved  Outcome: Progressing     Problem: Hemodynamics  Goal: Patient's hemodynamics, fluid balance and neurologic status will be stable or improve  Outcome: Progressing     Problem: Respiratory  Goal: Patient will achieve/maintain optimum respiratory ventilation and gas exchange  Outcome: Progressing     Problem: Self Care  Goal: Patient will have the ability to perform ADLs independently or with assistance (bathe, groom, dress, toilet and feed)  Outcome: Progressing       Patient is post op day 4. Verbalized an understanding of condition, treatment plan, and medications being administered. All labs and vitals stable throughout night and pt has no complaints of pain. Patient has ambulated 3x during night shift, completing multiple laps each time around unit. Patient on room air and tolerating ambulation great without the use of any assistive devices. Patient able to complete all incision care and feels safe to do on own at home. Sternal incision is clean and intact but still has some oozing at the bottom of incision. Dry gauze and tegaderm used at bottom of incision for scant drainage. Chest tube sites are clean, dry, and intact. Patient able to complete all ADLs on own and has had no difficulty in doing so. Patient uses heart  pillow appropriately and very compliant with instruction. Effective IS usage up to 2150.

## 2023-08-13 NOTE — PROGRESS NOTES
Patient discharged home with wife. A&Ox4. IV's taken out, patient taken down via self. Monitor taken off; monitor room notified. Discharge summary reviewed with patient. RN provided education regarding follow up care, appointments, and medications. RN also provided education regarding when to call doctor and when to call 911.

## 2023-08-13 NOTE — DISCHARGE SUMMARY
DISCHARGE SUMMARY    ADMISSION DATE: 8/9/2023    DISCHARGE DATE: 8/13/23    ADMITTING DIAGNOSES: Severe symptomatic aortic stenosis, hypertension, hyperlipidemia    DISCHARGE DIAGNOSES: Severe symptomatic aortic stenosis, hypertension, hyperlipidemia    PROCEDURES PERFORMED:   8/9/23 Dr. George  AORTIC VALVE REPLACEMENT with a 25 mm Garrett Inspiris, - Wound Class: Clean  ECHOCARDIOGRAM, TRANSESOPHAGEAL, INTRAOPERATIVE - Wound Class: Clean Contaminated    HISTORY OF PRESENT ILLNESS:  The patient is a 53 y.o. male with past medical history of hypertension who presents to clinic with progressive dizziness and fatigue for the past three years. He also reports decreased exercise tolerance and syncope. He denies chest pain, orthopnea, PND, or lower extremity edema.  Patient does have a history of occasional cigar smoking.  He does not inhale    HOSPITAL COURSE:   POD 1  HDS, SR, neuro intact, wounds intact, abdomen soft, fluid balance positive, wt up, 2 L NC. 150 mL out of chest tubes overnight. Nausea and vomiting this AM not relieved by ondansetron or prochlorperazine. Plan:  Dc mediastinal chest tubes and schulz. Diurese. One time dose of decadron for post op nausea. IS/ambulate.      POD 2  HDS- SBP 140s- pt refused amlodipine this AM, SR/ST, neuro intact, wounds intact, abdomen soft, fluid balance positive- good UOP yesterday with lasix, wt stable, room air.  Plan:  Continue diuresis, IS/ambulate. Transfer to [facility].      POD 3  HDS, SR/ST, neuro intact, wounds intact, abdomen soft +BM, fluid balance positive, wt stable,  room air.  Plan:  DC pacing wires. Continue diuresis, IS/ambulate. Anticipate dc home 1-2 days.     POD 4  HDS, SR, neuro intact, serosanguinous drainage from sternal wound, abdomen soft, fluid balance positive, wt below admit weight,  room air.  Plan:  Discharge home today. Patient to follow up in clinic in one month.       TELEMETRY:  8/10 SR 60-70s  8/11 SR/ST  8/12 SR/ST  8/13 SR    RECENT  LABS:     Lab Results   Component Value Date/Time    SODIUM 137 08/13/2023 01:20 AM    POTASSIUM 3.8 08/13/2023 01:20 AM    CHLORIDE 101 08/13/2023 01:20 AM    CO2 26 08/13/2023 01:20 AM    GLUCOSE 126 (H) 08/13/2023 01:20 AM    BUN 16 08/13/2023 01:20 AM    CREATININE 0.80 08/13/2023 01:20 AM      Lab Results   Component Value Date/Time    WBC 11.3 (H) 08/13/2023 01:20 AM    RBC 4.43 (L) 08/13/2023 01:20 AM    HEMOGLOBIN 12.9 (L) 08/13/2023 01:20 AM    HEMATOCRIT 38.4 (L) 08/13/2023 01:20 AM    MCV 86.7 08/13/2023 01:20 AM    MCH 29.1 08/13/2023 01:20 AM    MCHC 33.6 08/13/2023 01:20 AM    MPV 9.7 08/13/2023 01:20 AM    NEUTSPOLYS 56.30 08/08/2023 11:07 AM    LYMPHOCYTES 29.70 08/08/2023 11:07 AM    MONOCYTES 9.60 08/08/2023 11:07 AM    EOSINOPHILS 3.40 08/08/2023 11:07 AM    BASOPHILS 0.50 08/08/2023 11:07 AM      Lab Results   Component Value Date/Time    PROTHROMBTM 16.4 (H) 08/09/2023 12:32 PM    INR 1.34 (H) 08/09/2023 12:32 PM        Fluids:    Intake/Output Summary (Last 24 hours) at 8/13/2023 0719  Last data filed at 8/12/2023 2300  Gross per 24 hour   Intake 480 ml   Output --   Net 480 ml     Admit weight: Weight: 97.3 kg (214 lb 8.1 oz)  Current weight: Weight: 94.7 kg (208 lb 12.4 oz) (08/12/23 2045)    ALLERGIES:     Patient has no known allergies.    EJECTION FRACTION:  60%    CARDIAC MEDICATIONS:    ASA - Yes    Plavix - No; contraindicated because of Other No CAD    Post-operative Beta Blockers - Yes    Ace/ARB- Yes    Statin - Yes    Aldactone- No; contraindicated because of Normal EF    SGLT2i-  No contraindicated because of cost prohibitive    DISCHARGE MEDICATIONS:      Medication List        START taking these medications        Instructions   acetaminophen 500 MG Tabs  Commonly known as: Tylenol   Take 1 Tablet by mouth every 6 hours as needed for Mild Pain.  Dose: 500 mg     amLODIPine 5 MG Tabs  Start taking on: August 14, 2023  Commonly known as: Norvasc   Take 1 Tablet by mouth every  day.  Dose: 5 mg     amoxicillin-clavulanate 500-125 MG Tabs  Commonly known as: Augmentin   Take 1 Tablet by mouth 3 times a day for 10 days.  Dose: 1 Tablet     aspirin 81 MG EC tablet  Start taking on: August 14, 2023   Take 1 Tablet by mouth every day.  Dose: 81 mg     furosemide 20 MG Tabs  Commonly known as: Lasix   Take 1 Tablet by mouth every day.  Dose: 20 mg     HYDROcodone-acetaminophen 5-325 MG Tabs per tablet  Commonly known as: Norco   Take 1 Tablet by mouth every 6 hours as needed (severe pain) for up to 14 days.  Dose: 1 Tablet     metoprolol tartrate 25 MG Tabs  Commonly known as: Lopressor   Take 1 Tablet by mouth 2 times a day.  Dose: 25 mg     potassium chloride SA 20 MEQ Tbcr  Commonly known as: Kdur   Take 1 Tablet by mouth every day.  Dose: 20 mEq            CONTINUE taking these medications        Instructions   atorvastatin 20 MG Tabs  Commonly known as: Lipitor   Take 20 mg by mouth every day.  Dose: 20 mg     Calcium 600+D 600-20 MG-MCG Tabs  Generic drug: Calcium Carb-Cholecalciferol   Take 1 Capsule by mouth every day.  Dose: 1 Capsule     CENTRUM SILVER 50+MEN PO   Take 1 Tablet by mouth every day.  Dose: 1 Tablet     Flax Seed Oil 1000 MG Caps   Take 1,000 mg by mouth every day.  Dose: 1,000 mg     lisinopril 20 MG Tabs  Commonly known as: Prinivil   Take 20 mg by mouth every day.  Dose: 20 mg     Magnesium 250 MG Tabs   Take 250 mg by mouth every day.  Dose: 250 mg     QC Tumeric Complex 500 MG Caps  Generic drug: Turmeric   Take 500 mg by mouth every day.  Dose: 500 mg     Vitamin C 1000 MG Tabs   Take 1,000 mg by mouth every day.  Dose: 1,000 mg            STOP taking these medications      Garlic 1000 MG Caps     Omega-3 1000 MG Caps     vitamin E 1000 units (450 mg) Caps              NARCOTIC PAIN MEDICATIONS:   In prescribing controlled substances to this patient, I certify that I have obtained and reviewed their medical history. I have also made a good ysabel effort to obtain  applicable records from other providers who have treated the patient .    I have conducted a physical exam and documented it. I have reviewed the patient's prescription history as maintained by the [place] Prescription Monitoring Program.     I have assessed the patient’s risk for abuse, dependency, and addiction using the validated Opioid Risk Tool.    Given the above, I believe the benefits of controlled substance therapy outweigh the risks. The reasons for prescribing controlled substances include non-narcotic, oral analgesic alternatives have been inadequate for pain control. Accordingly, I have discussed the risk and benefits, treatment plan, and alternative therapies with the patient.     Pt understands this prescription is a controlled substance which is potentially habit-forming and its use is regulated by the [employer]. It must be submitted to the pharmacy within 5 days of the date written and can not be called in or faxed to the pharmacy. Refills are subject to terms of a medicine agreement. Any refill requires a new prescription that must be obtained from this office during regular office hours Monday through Thursday 7 am to 4 pm. We ask for 72 hours notice to get an appointment for a narcotic pain medication refill. This medicine can cause nausea, significant constipation, sedation, confusion.     DIET:   Cardiac diet    DISCHARGE INSTRUCTIONS DISCUSSED WITH THE PATIENT:      1. NO driving for 4 weeks after surgery. You may ride as a passenger.  2. NO lifting of any item over 10 lbs (e.g. gallon of milk) for 6 weeks after surgery.  3. DO walk as much as possible! Walk a minimum of once a day. Depending on your fatigue and comfort level, you may walk as much as you wish. There is no maximum.  4. Other physical activities (sex, housework, gardening, etc.) are OK after 4 weeks   5. Continue using incentive spirometer for 2 weeks, especially if going home on oxygen.    Incision Care:  1. SHOWER ONLY - no baths.  Clean incision daily with plain Ivory ® soap or any other dye or perfume free soap. Then pat incision dry with clean towel. Avoid creams or lotions on the incision(s).  a. If there is any increase in redness or swelling, or separation of the incision line, or thick drainage* from any of the incisions, call right away  * Clear, thin drainage is not abnormal especially from the leg incision and/or                         chest tube sites.  2. Continue to wear your RONNI Stockings for 4 weeks. You may take off the stockings when in bed or when the legs are elevated.    Patient instructed to call [facility] cardiac surgery at [phone]  if any increased shortness of breath, uncontrolled pain, weight gain greater than 3 pounds in 1 day or 5 pounds in 1 week, SBP >140, HR <60 or redness swelling or drainage of incisions.      FOLLOW-UP:   Future Appointments   Date Time Provider Department Center   8/30/2023  9:15 AM CASS Iyer [facility] None   9/11/2023  1:00 PM CT RESOURCE PROVIDER CT None

## 2023-08-24 ENCOUNTER — TELEPHONE (OUTPATIENT)
Dept: CARDIOTHORACIC SURGERY | Facility: MEDICAL CENTER | Age: 53
End: 2023-08-24
Payer: COMMERCIAL

## 2023-08-24 NOTE — TELEPHONE ENCOUNTER
Hospital follow up call    he states that all incisions are healing well and approximated with no s/s of infection (redness, puss, fever, purulent drainage, or tenderness).    he is using the IS; volume is improved. Current volume is 2500 ml.    he is walking everyday, distance is increased from the hospital.    he is obtaining daily weights. Current weight is around 202 lbs which is less than his pre surgery weight of 216 lbs.    he is taking all prescribed meds as directed.  he has no medication questions.    he is taking vitals (HR and BP).    BP's: 110 florinda / 80's  HR's: HR < 100     he has had a bowel movement since discharge.    he is showering everyday or every other day and is no longer wearing the compression/RONNI hose. He denies LE edema.    he states that the post op pain is well controlled.  Narcotics are not being utilized. Tylenol is occasionally being utilized.    he has no additional questions at this time. Follow up education was not needed on anything. Follow up appointments were reviewed and I ensured they have my number if issues or concerns arise.      Follow up call time was 8 minutes.

## 2023-08-26 NOTE — PROGRESS NOTES
"Chief Complaint   Patient presents with    Hypertension     Hospital follow up       Subjective     Jassi Rooney is a 53 y.o. male who presents today for follow-up after recent surgery.    Patient has a history of severe aortic stenosis with 3 years of fatigue and exercise intolerance, also hypertension and hyperlipidemia.  Last seen in clinic with Dr. Hancock.  Underwent aortic valve replacement with an Garrett Inspira's on 8/9/2023.    Today in clinic patient is feeling great.  States that his legs \"feel right\" has been walking 4 to 5 miles with no issues.  Previously he was working at [employer] and could notice severe fatigue when walking upstairs, states he feels much better.  He did have a few episodes of dizziness when sitting, these are self-limiting and the last time was about a week ago.  Chest incision is healing well no reports of drainage no fever, has minimal pain.     Past Medical History:   Diagnosis Date    Arrhythmia 07/24/2023    flutter    Breath shortness 07/24/2023    with exertion    Dental disorder 07/24/2023    upper & lower partials    Heart burn 07/24/2023    drinks water    Heart murmur 07/24/2023    as child    Heart valve disease 07/24/2023    aortic-tricuspid    High cholesterol 07/24/2023    Hypertension     Indigestion      Past Surgical History:   Procedure Laterality Date    AORTIC VALVE REPLACEMENT  8/9/2023    Procedure: AORTIC VALVE REPLACEMENT,;  Surgeon: Chris George D.O.;  Location: SURGERY [facility];  Service: Cardiothoracic    ECHOCARDIOGRAM, TRANSESOPHAGEAL, INTRAOPERATIVE  8/9/2023    Procedure: ECHOCARDIOGRAM, TRANSESOPHAGEAL, INTRAOPERATIVE;  Surgeon: Chris George D.O.;  Location: SURGERY [facility];  Service: Cardiothoracic    ANGIOGRAM      COLONOSCOPY       No family history on file.  Social History     Socioeconomic History    Marital status:      Spouse name: Not on file    Number of children: Not on file    Years of education: Not " on file    Highest education level: Not on file   Occupational History    Not on file   Tobacco Use    Smoking status: Former     Types: Cigars     Quit date: 2022     Years since quittin.6     Passive exposure: Never    Smokeless tobacco: Never    Tobacco comments:     cigar every few months   Vaping Use    Vaping Use: Never used   Substance and Sexual Activity    Alcohol use: Yes     Alcohol/week: 1.2 oz     Types: 2 Shots of liquor per week     Comment: 1 shot per month    Drug use: Never    Sexual activity: Yes     Partners: Female     Birth control/protection: None   Other Topics Concern    Not on file   Social History Narrative    Not on file     Social Determinants of Health     Financial Resource Strain: Not on file   Food Insecurity: Not on file   Transportation Needs: Not on file   Physical Activity: Not on file   Stress: Not on file   Social Connections: Not on file   Intimate Partner Violence: Not on file   Housing Stability: Not on file     No Known Allergies  Outpatient Encounter Medications as of 2023   Medication Sig Dispense Refill    ibuprofen (MOTRIN) 800 MG Tab Take 800 mg by mouth every 8 hours as needed. for pain      acetaminophen (TYLENOL) 500 MG Tab Take 1 Tablet by mouth every 6 hours as needed for Mild Pain.      amLODIPine (NORVASC) 5 MG Tab Take 1 Tablet by mouth every day. 30 Tablet 2    aspirin 81 MG EC tablet Take 1 Tablet by mouth every day. 90 Tablet 2    metoprolol tartrate (LOPRESSOR) 25 MG Tab Take 1 Tablet by mouth 2 times a day. 60 Tablet 2    Ascorbic Acid (VITAMIN C) 1000 MG Tab Take 1,000 mg by mouth every day.      Turmeric (QC TUMERIC COMPLEX) 500 MG Cap Take 500 mg by mouth every day.      Magnesium 250 MG Tab Take 250 mg by mouth every day.      Calcium Carb-Cholecalciferol (CALCIUM 600+D) 600-20 MG-MCG Tab Take 1 Capsule by mouth every day.      Flaxseed, Linseed, (FLAX SEED OIL) 1000 MG Cap Take 1,000 mg by mouth every day.      Multiple Vitamins-Minerals  "(CENTRUM SILVER 50+MEN PO) Take 1 Tablet by mouth every day.      lisinopril (PRINIVIL) 20 MG Tab Take 20 mg by mouth every day.      atorvastatin (LIPITOR) 20 MG Tab Take 20 mg by mouth every day.      Amoxicillin 500 MG Tab TAKE 1 TABLET BY MOUTH THREE TIMES DAILY UNTIL GONE (Patient not taking: Reported on 2023)      [] HYDROcodone-acetaminophen (NORCO) 5-325 MG Tab per tablet Take 1 Tablet by mouth every 6 hours as needed (severe pain) for up to 14 days. 56 Tablet 0    [DISCONTINUED] furosemide (LASIX) 20 MG Tab Take 1 Tablet by mouth every day. 30 Tablet 0    [DISCONTINUED] potassium chloride SA (KDUR) 20 MEQ Tab CR Take 1 Tablet by mouth every day. 30 Tablet 0     No facility-administered encounter medications on file as of 2023.     Review of Systems   Respiratory:  Negative for shortness of breath.    Cardiovascular:  Negative for chest pain, palpitations, orthopnea and leg swelling.   Neurological:  Negative for dizziness and loss of consciousness.   All other systems reviewed and are negative.             Objective     /76 (BP Location: Left arm, Patient Position: Sitting)   Pulse 74   Resp 16   Ht 1.803 m (5' 11\")   Wt 91.6 kg (202 lb)   SpO2 97%   BMI 28.17 kg/m²     Physical Exam  Vitals reviewed.   Constitutional:       General: He is not in acute distress.     Appearance: He is normal weight.   Cardiovascular:      Rate and Rhythm: Normal rate and regular rhythm.      Heart sounds: No murmur heard.  Pulmonary:      Effort: Pulmonary effort is normal. No respiratory distress.      Breath sounds: Normal breath sounds. No rhonchi.   Abdominal:      General: There is no distension.      Tenderness: There is no abdominal tenderness.   Musculoskeletal:      Cervical back: Normal range of motion.      Right lower leg: No edema.      Left lower leg: No edema.   Neurological:      General: No focal deficit present.      Mental Status: He is alert.            No results found for: " "\"CHOLSTRLTOT\", \"LDL\", \"HDL\", \"TRIGLYCERIDE\"    Lab Results   Component Value Date/Time    SODIUM 137 08/13/2023 01:20 AM    POTASSIUM 3.8 08/13/2023 01:20 AM    CHLORIDE 101 08/13/2023 01:20 AM    CO2 26 08/13/2023 01:20 AM    GLUCOSE 126 (H) 08/13/2023 01:20 AM    BUN 16 08/13/2023 01:20 AM    CREATININE 0.80 08/13/2023 01:20 AM     Lab Results   Component Value Date/Time    ALKPHOSPHAT 127 (H) 08/08/2023 11:07 AM    ASTSGOT 13 08/08/2023 11:07 AM    ALTSGPT 27 08/08/2023 11:07 AM    TBILIRUBIN 0.5 08/08/2023 11:07 AM      Intraoperative transesophageal echocardiogram 8/9/2023  CONCLUSIONS  RISHABH prior to AVR.  Patient has normal biventricular systolic function,   LVEF 60%.  Bicuspid aortic valve with severe stenosis s/p AVR with 25   mm Inspiris valve.  New valve is functioning normally and well seated   with no evidence of perivalvular leak, mean gradient 6 mmHg.  Mild MR.    No aortic dissection or pericardial effusion.  Stable post-bypass exam.    Assessment & Plan     1. S/P AVR (aortic valve replacement)        2. Severe aortic stenosis        3. Primary hypertension        4. Hyperlipidemia, unspecified hyperlipidemia type            Medical Decision Making: Today's Assessment/Status/Plan:        History of severe aortic stenosis, bicuspid aortic valve  -S/p bovine AV valve replacement  -Follows with CT surgery on 9/11/2023  -Post op echo in next few months  -No evidence of volume overload,will trial patient off of Lasix/potassium  -Contact our office if any evidence of volume overload or shortness of breath  -Continue metoprolol, aspirin and statin    Follow-up in 6 months or sooner if needed    Lupe Buenrostro, MSN, APRN  [facility]  [phone]    Please note this dictation was created using voice recognition software.  I have made every reasonable attempt to correct obvious errors, but there may be errors of grammar and possibly content that I did not discover before " finalizing the note.

## 2023-08-30 ENCOUNTER — OFFICE VISIT (OUTPATIENT)
Dept: CARDIOLOGY | Facility: MEDICAL CENTER | Age: 53
End: 2023-08-30
Attending: NURSE PRACTITIONER
Payer: COMMERCIAL

## 2023-08-30 VITALS
WEIGHT: 202 LBS | OXYGEN SATURATION: 97 % | SYSTOLIC BLOOD PRESSURE: 108 MMHG | HEIGHT: 71 IN | BODY MASS INDEX: 28.28 KG/M2 | HEART RATE: 74 BPM | RESPIRATION RATE: 16 BRPM | DIASTOLIC BLOOD PRESSURE: 76 MMHG

## 2023-08-30 DIAGNOSIS — Z95.2 S/P AVR (AORTIC VALVE REPLACEMENT): ICD-10-CM

## 2023-08-30 DIAGNOSIS — E78.5 HYPERLIPIDEMIA, UNSPECIFIED HYPERLIPIDEMIA TYPE: ICD-10-CM

## 2023-08-30 DIAGNOSIS — I35.0 SEVERE AORTIC STENOSIS: ICD-10-CM

## 2023-08-30 DIAGNOSIS — I10 PRIMARY HYPERTENSION: ICD-10-CM

## 2023-08-30 PROCEDURE — 99213 OFFICE O/P EST LOW 20 MIN: CPT | Performed by: NURSE PRACTITIONER

## 2023-08-30 PROCEDURE — 3074F SYST BP LT 130 MM HG: CPT | Performed by: NURSE PRACTITIONER

## 2023-08-30 PROCEDURE — 99024 POSTOP FOLLOW-UP VISIT: CPT | Performed by: NURSE PRACTITIONER

## 2023-08-30 PROCEDURE — 3078F DIAST BP <80 MM HG: CPT | Performed by: NURSE PRACTITIONER

## 2023-08-30 RX ORDER — AMOXICILLIN 500 MG/1
TABLET, FILM COATED ORAL
COMMUNITY
Start: 2023-07-28 | End: 2023-08-30

## 2023-08-30 RX ORDER — IBUPROFEN 800 MG/1
800 TABLET ORAL EVERY 8 HOURS PRN
COMMUNITY
Start: 2023-07-28

## 2023-08-30 ASSESSMENT — ENCOUNTER SYMPTOMS
DIZZINESS: 0
LOSS OF CONSCIOUSNESS: 0
SHORTNESS OF BREATH: 0
ORTHOPNEA: 0
PALPITATIONS: 0

## 2023-08-30 ASSESSMENT — FIBROSIS 4 INDEX: FIB4 SCORE: 0.66

## 2023-08-30 NOTE — PROGRESS NOTES
"CHIEF COMPLAINT: Post-op visit     PROCEDURE: 8/9/23 Dr. George  AORTIC VALVE REPLACEMENT with a 25 mm Garrett Inspiris ECHOCARDIOGRAM, TRANSESOPHAGEAL, INTRAOPERATIVE     HPI: Patient presents to clinic for 1 month follow-up.  He reports feeling well.  He walks 4 to 5 miles per day.  He denies chest pain, shortness of breath.  He states he has occasional popping/clicking in his sternum and pain in his pec muscles.  Midline incision is approximated with some scabbing.  Patient denies drainage and erythema.    /72 (BP Location: Left arm, Patient Position: Sitting, BP Cuff Size: Adult)   Pulse 68   Temp 36.4 °C (97.5 °F) (Temporal)   Ht 1.803 m (5' 11\")   Wt 93.7 kg (206 lb 8 oz)   SpO2 99%     PHYSICAL EXAM:  Cardiac: S1S2  Neuro:  AAO x 3  Resp:  CTA  Wounds:  CDI  Sternum:  Stable    PLAN: Discharge patient from cardiac surgery clinic.  Patient to follow-up with cardiology.  We reviewed post operative sternal precautions, weight limits and driving precautions moving forward.  We reviewed SBE prophylaxis.      Overall, we are very pleased with the patient’s progress and we have instructed the patient to follow-up with us in the future should they have any concerns related to their surgery. Otherwise, we will see the patient on a PRN basis. The patient will continue to follow-up with their Cardiologist and PCP.  The patient has been informed that any further prescription refills should be done through their primary care physician and/or cardiologist.  They acknowledged understanding.  Thank you for allowing us to participate in the care of this very pleasant patient and please let us know if there is any way we may be of further assistance.   "

## 2023-08-31 ENCOUNTER — TELEPHONE (OUTPATIENT)
Dept: CARDIOLOGY | Facility: MEDICAL CENTER | Age: 53
End: 2023-08-31
Payer: COMMERCIAL

## 2023-08-31 NOTE — TELEPHONE ENCOUNTER
Noted below:  Received: Yesterday  CASS Iyer R.N.  Just saw this patient this morning. Had an AV-valve replacement, talked to him about post op echo but thought CT surgery would order. Guess I have to, no rush to schedule, can do it anytime in the next few months. Can you please call him and let him know since he's not on My chart.     Called and discussed above with patient. Patient will call to scheduled Echo.

## 2023-09-11 ENCOUNTER — OFFICE VISIT (OUTPATIENT)
Dept: CARDIOTHORACIC SURGERY | Facility: MEDICAL CENTER | Age: 53
End: 2023-09-11
Payer: COMMERCIAL

## 2023-09-11 VITALS
DIASTOLIC BLOOD PRESSURE: 72 MMHG | HEART RATE: 68 BPM | OXYGEN SATURATION: 99 % | SYSTOLIC BLOOD PRESSURE: 116 MMHG | HEIGHT: 71 IN | BODY MASS INDEX: 28.91 KG/M2 | TEMPERATURE: 97.5 F | WEIGHT: 206.5 LBS

## 2023-09-11 DIAGNOSIS — Z09 POSTOP CHECK: ICD-10-CM

## 2023-09-11 PROCEDURE — 99024 POSTOP FOLLOW-UP VISIT: CPT | Performed by: NURSE PRACTITIONER

## 2023-09-11 PROCEDURE — 3074F SYST BP LT 130 MM HG: CPT | Performed by: NURSE PRACTITIONER

## 2023-09-11 PROCEDURE — 3078F DIAST BP <80 MM HG: CPT | Performed by: NURSE PRACTITIONER

## 2023-09-11 ASSESSMENT — FIBROSIS 4 INDEX: FIB4 SCORE: 0.66

## 2023-11-09 ENCOUNTER — TELEPHONE (OUTPATIENT)
Dept: CARDIOLOGY | Facility: MEDICAL CENTER | Age: 53
End: 2023-11-09
Payer: COMMERCIAL

## 2023-11-09 DIAGNOSIS — G89.18 POST-OP PAIN: ICD-10-CM

## 2023-11-09 NOTE — TELEPHONE ENCOUNTER
VIOLETA    Caller: Ebony (Wife)    Topic/issue: She is wondering if patient should still be taking amLODIPine (NORVASC) 5 MG Tab and metoprolol tartrate (LOPRESSOR) 25 MG Tab, please call and advise. She states the prescriptions were denied     Callback Number: [phone]    Thank You   Mary BONDS

## 2023-11-09 NOTE — TELEPHONE ENCOUNTER
To DB: Patient requesting to know if he needs to continue his amlodipine and metoprolol. Both were previously ordered by CT surgery so unable to refill per protocol. Please advise if okay to refill medications. Thank you!

## 2023-11-28 RX ORDER — AMLODIPINE BESYLATE 5 MG/1
5 TABLET ORAL DAILY
Qty: 90 TABLET | Refills: 3 | Status: SHIPPED | OUTPATIENT
Start: 2023-11-28

## 2024-01-24 ENCOUNTER — OFFICE VISIT (OUTPATIENT)
Dept: CARDIOLOGY | Facility: MEDICAL CENTER | Age: 54
End: 2024-01-24
Attending: INTERNAL MEDICINE
Payer: COMMERCIAL

## 2024-01-24 ENCOUNTER — HOSPITAL ENCOUNTER (OUTPATIENT)
Dept: CARDIOLOGY | Facility: MEDICAL CENTER | Age: 54
End: 2024-01-24
Attending: NURSE PRACTITIONER
Payer: COMMERCIAL

## 2024-01-24 VITALS
RESPIRATION RATE: 16 BRPM | BODY MASS INDEX: 29.54 KG/M2 | HEIGHT: 71 IN | SYSTOLIC BLOOD PRESSURE: 110 MMHG | OXYGEN SATURATION: 98 % | DIASTOLIC BLOOD PRESSURE: 70 MMHG | HEART RATE: 70 BPM | WEIGHT: 211 LBS

## 2024-01-24 DIAGNOSIS — I10 PRIMARY HYPERTENSION: ICD-10-CM

## 2024-01-24 DIAGNOSIS — Z95.2 S/P AVR (AORTIC VALVE REPLACEMENT): ICD-10-CM

## 2024-01-24 DIAGNOSIS — Z95.2 H/O AORTIC VALVE REPLACEMENT: ICD-10-CM

## 2024-01-24 DIAGNOSIS — E78.5 HYPERLIPIDEMIA, UNSPECIFIED HYPERLIPIDEMIA TYPE: ICD-10-CM

## 2024-01-24 PROBLEM — I35.0 SEVERE AORTIC STENOSIS: Status: RESOLVED | Noted: 2023-07-19 | Resolved: 2024-01-24

## 2024-01-24 LAB — LV EJECT FRACT  99904: 55

## 2024-01-24 PROCEDURE — 3074F SYST BP LT 130 MM HG: CPT | Performed by: INTERNAL MEDICINE

## 2024-01-24 PROCEDURE — 3078F DIAST BP <80 MM HG: CPT | Performed by: INTERNAL MEDICINE

## 2024-01-24 PROCEDURE — 99214 OFFICE O/P EST MOD 30 MIN: CPT | Performed by: INTERNAL MEDICINE

## 2024-01-24 PROCEDURE — 93356 MYOCRD STRAIN IMG SPCKL TRCK: CPT | Performed by: INTERNAL MEDICINE

## 2024-01-24 PROCEDURE — 93306 TTE W/DOPPLER COMPLETE: CPT | Mod: 26 | Performed by: INTERNAL MEDICINE

## 2024-01-24 PROCEDURE — 93356 MYOCRD STRAIN IMG SPCKL TRCK: CPT

## 2024-01-24 PROCEDURE — 99213 OFFICE O/P EST LOW 20 MIN: CPT | Performed by: INTERNAL MEDICINE

## 2024-01-24 ASSESSMENT — FIBROSIS 4 INDEX: FIB4 SCORE: 0.66

## 2024-01-24 NOTE — PROGRESS NOTES
Interventional cardiology Follow-up Consultation Note        CC: S/p aortic valve replacement, hypertension, dyslipidemia   Patient ID/HPI:   53-year-old male patient initially seen by me for severe symptomatic aortic stenosis, underwent surgical aortic valve replacement with a 25 mm Inspiris valve, his postoperative course was uncomplicated, he was discharged on day 4.  He feels well denies chest pain, shortness of breath.  His energy levels, exercise capacity definitely improved significantly after valve replacement.        Past Medical History:   Diagnosis Date    Arrhythmia 07/24/2023    flutter    Breath shortness 07/24/2023    with exertion    Dental disorder 07/24/2023    upper & lower partials    Heart burn 07/24/2023    drinks water    Heart murmur 07/24/2023    as child    Heart valve disease 07/24/2023    aortic-tricuspid    High cholesterol 07/24/2023    Hypertension     Indigestion          Current Outpatient Medications   Medication Sig Dispense Refill    amLODIPine (NORVASC) 5 MG Tab Take 1 Tablet by mouth every day. 90 Tablet 3    metoprolol tartrate (LOPRESSOR) 25 MG Tab Take 1 Tablet by mouth 2 times a day. 180 Tablet 3    ibuprofen (MOTRIN) 800 MG Tab Take 800 mg by mouth every 8 hours as needed. for pain      acetaminophen (TYLENOL) 500 MG Tab Take 1 Tablet by mouth every 6 hours as needed for Mild Pain.      aspirin 81 MG EC tablet Take 1 Tablet by mouth every day. 90 Tablet 2    Ascorbic Acid (VITAMIN C) 1000 MG Tab Take 1,000 mg by mouth every day.      Turmeric (QC TUMERIC COMPLEX) 500 MG Cap Take 500 mg by mouth every day.      Magnesium 250 MG Tab Take 250 mg by mouth every day.      Calcium Carb-Cholecalciferol (CALCIUM 600+D) 600-20 MG-MCG Tab Take 1 Capsule by mouth every day.      Flaxseed, Linseed, (FLAX SEED OIL) 1000 MG Cap Take 1,000 mg by mouth every day.      Multiple Vitamins-Minerals (CENTRUM SILVER 50+MEN PO) Take 1 Tablet by mouth every day.      lisinopril (PRINIVIL) 20  "MG Tab Take 20 mg by mouth every day.      atorvastatin (LIPITOR) 20 MG Tab Take 20 mg by mouth every day.       No current facility-administered medications for this visit.         Physical Exam:  Ambulatory Vitals  /70 (BP Location: Left arm, Patient Position: Sitting, BP Cuff Size: Adult)   Pulse 70   Resp 16   Ht 1.803 m (5' 11\")   Wt 95.7 kg (211 lb)   SpO2 98%    Oxygen Therapy:  Pulse Oximetry: 98 %  BP Readings from Last 4 Encounters:   01/24/24 110/70   09/11/23 116/72   08/30/23 108/76   08/13/23 129/79       Weight/BMI: Body mass index is 29.43 kg/m².  Wt Readings from Last 4 Encounters:   01/24/24 95.7 kg (211 lb)   09/11/23 93.7 kg (206 lb 8 oz)   08/30/23 91.6 kg (202 lb)   08/12/23 94.7 kg (208 lb 12.4 oz)       General: Well appearing and in no apparent distress  Neck: JVP absent, carotid bruits absent  Lungs: respiratory sounds  normal, additional breath sounds absent  Heart: Regular rhythm,   No palpable thrills on palpation, murmurs absent, no rubs,   Lower extremity edema absent.     Coronary angiogram 7/25/2023  Final impression:  Non-obstructive minimal coronary artery disease        Findings:  1.  Left main coronary artery:  Normal.  2.  Left anterior descending artery: Apical LAD has luminal irregularities.  3.  Left circumflex coronary artery:  Normal.   4.  Right coronary artery:  Normal.  This is a right dominant system.  Distal abdominal aortogram with iliac runoff showed widely patent iliofemoral vessels      Medical Decision Making:  Problem List Items Addressed This Visit       Primary hypertension    HLD (hyperlipidemia)     Other Visit Diagnoses       H/O aortic valve replacement        Relevant Orders    CBC WITH DIFFERENTIAL    Basic Metabolic Panel    Lipid Profile          She is doing well post AVR.  Cardiac auscultation is normal.  Postoperative echocardiogram scheduled later today.  Blood pressure well-controlled.  Continue current therapy with amlodipine, aspirin, " Lipitor, lisinopril, metoprolol.  Will check labs, lipid panel when he comes back.      Return in about 1 year (around 1/24/2025).      Dale MEYER  Interventional cardiologist  [facility], [facility]  [address] [address]  Phone: [phone]  Fax: [phone]

## 2024-01-24 NOTE — OR NURSING
Patient discharged from PACU. Discharge instructions given and patient and wife understand.    Is This A New Presentation, Or A Follow-Up?: Skin Lesion How Severe Is Your Skin Lesion?: mild Has Your Skin Lesion Been Treated?: not been treated Is This A New Presentation, Or A Follow-Up?: Skin Lesions

## 2024-01-24 NOTE — PATIENT INSTRUCTIONS
The following are key points to remember from the 2021 American Heart Association (AHA) scientific statement on dietary guidance to improve cardiovascular health:  Adjust energy intake and expenditure to achieve and maintain a healthy body weight. Energy balance may be achieved by combining a healthy dietary pattern with ?150 minutes of moderate physical activity per week. Metabolic needs decrease by about  calories per day with each decade of adult life.  Eat plenty of fruits and vegetables; choose a wide variety. A diet rich in fruits and vegetables--except for white potatoes--is associated with a reduced risk of cardiovascular disease (CVD). Eating a wide variety of these provides adequate essential nutrients and phytochemicals. Fresh, frozen, canned, and/or dried fruits and vegetables are all acceptable.  Choose foods made mostly with whole grains rather than refined grains. Randomized controlled trials have shown that eating whole grains instead of refined grains improves cardiovascular risk factors.  Choose healthy sources of protein.  Mostly protein from plants (legumes and nuts): Higher intake of legumes (beans and peas) was associated with lower CVD risk in a recent systematic review. Higher nut intake was associated with lower risk of CVD, coronary heart disease (CHD), and stroke.  Fish and seafood: Current evidence supports dietary patterns that include ?2 fish meals per week. However, preparation of fish by frying is not associated with benefits.  Low-fat or fat-free dairy products instead of full-fat dairy products: Dietary patterns that include low-fat dairy are associated with a lower risk of obesity, CVD, and mortality.  If meat or poultry are desired, choose lean cuts and avoid processed forms: There is a direct association between red meat intake and CVD incidence and mortality, and an even stronger association for processed meat such as sebastian or hot dogs.  Use liquid plant oils rather than  tropical oils (coconut, palm, and palm kernel), animal fats (e.g., butter and lard), and partially hydrogenated fats. Liquid plant oils are rich in unsaturated fats, which reduce low-density lipoprotein (LDL) cholesterol and CVD risk, as are peanuts, most tree nuts, and flax seeds.  Choose minimally processed foods instead of ultra-processed foods.* High intake of ultra-processed foods is associated with obesity, type 2 diabetes, CVD, and all-cause mortality.  Minimize intake of beverages and foods with added sugars. Added sugars have consistently been associated with elevated risk of type 2 diabetes, CHD, and excess body weight. Alternative sweeteners have shown mixed effects on metabolism.  Choose and prepare foods with little or no salt. There is a direct, positive relationship between salt intake and blood pressure. In the [place], leading sources of salt are packaged/processed foods and foods prepared outside the home.  If you do not drink alcohol, do not start; if you choose to drink alcohol, limit intake. Risk of atrial fibrillation and of hemorrhagic stroke increases with increased alcohol intake. Ischemic stroke and CHD are lowest in those who drink 1-2 alcoholic beverages daily. The AHA does not recommend commencing alcohol use to improve CVD health.  Adhere to this guidance regardless of where food is prepared or consumed.

## 2024-01-26 ENCOUNTER — TELEPHONE (OUTPATIENT)
Dept: CARDIOLOGY | Facility: MEDICAL CENTER | Age: 54
End: 2024-01-26
Payer: COMMERCIAL

## 2024-01-26 NOTE — TELEPHONE ENCOUNTER
----- Message from CASS Iyer sent at 1/25/2024  8:46 AM PST -----  Post operative echocardiogram shows normal functioning bioprosthetic aortic valve.  No changes or issues noted.

## 2024-01-26 NOTE — LETTER
2024        Priscilla Rooney  [address] [place] [id number]          Dear Priscilla,    We have received the results of your recent: Echocardiogram    Your test came back advanced practicing provider MICAH Iyer advised post operative echocardiogram shows normal functioning bioprosthetic aortic valve. No changes or issues noted .  Please follow up as previously discussed with your physician.      Feel free to call us with any questions.        Sincerely,          Ivy HENDRIX  Electronically Signed    Resulted Orders   EC-ECHOCARDIOGRAM COMPLETE W/O CONT   Result Value Ref Range    Left Ventrical Ejection Fraction 55     Narrative    Transthoracic  Echo Report      Echocardiography Laboratory    CONCLUSIONS  Mild concentric left ventricular hypertrophy.  Normal left ventricular systolic function.  The left ventricular ejection fraction is visually estimated to be 55%.  Normal right ventricular size and systolic function.  Known bioprosthetic aortic valve that is functioning normally with   normal transvalvular gradients.    Compared to the prior RISHABH study on 2023, left ventricular function   remains preserved.    PRISCILLA ROONEY  Exam Date:         2024                      09:21  Exam Location:     Out Patient  Priority:          Routine    Ordering Physician:        MACKENZIE SUMMERS  Referring Physician:       [id number]  Sonographer:               Marlon Castaneda                              [facility], [facility]    Age:    53     Gender:    M  MRN:    [id number]  :    1970  BSA:    2.14   Ht (in):    71     Wt (lb):    206  Exam Type:     Complete    Indications:     Aortic valve replacement  ICD Codes:       V433    CPT Codes:       21583    BP:   116    /   72     HR:   60  Technical Quality:       Fair    MEASUREMENTS  (Male / Female) Normal Values  2D ECHO  LV Diastolic Diameter PLAX        4.4 cm                4.2 - 5.9 / 3.9 - 5.3   cm  LV Systolic Diameter PLAX          3.5 cm                2.1 - 4.0 cm  IVS Diastolic Thickness           1.2 cm                  LVPW Diastolic Thickness          1.4 cm                  LVOT Diameter                     2.2 cm                  Estimated LV Ejection Fraction    55 %                    LA Volume Index                   30.7 cm3/m2           16 - 28 cm3/m2    DOPPLER  AV Peak Velocity                  1.9 m/s                 AV Peak Gradient                  14 mmHg                 AV Mean Gradient                  8 mmHg                  LVOT Peak Velocity                0.76 m/s                AV Area Cont Eq vti               1.6 cm2                 MV Velocity Time Integral         24 cm                   Mitral E Point Velocity           0.78 m/s                Mitral E to A Ratio               1.1                     MV Pressure Half Time             54 ms                   MV Area PHT                       4.1 cm2                 MV Deceleration Time              184 ms                  Pulmonary Vein Systolic Velocity  0.4 m/s                 Pulmonary Vein Diastolic Velocit  0.45 m/s                Pulmonary Vein S/D Ratio          0.9                     Pulmonary Vein A Velocity         0.32 m/s                Pulmonary Vein A Duration         182 ms                  TR Peak Velocity                  209 cm/s                PV Peak Velocity                  1 m/s                   PV Peak Gradient                  4 mmHg                  PV Mean Gradient                  2 mmHg                  RVOT Peak Velocity                0.47 m/s                LV E' Lateral Velocity            8.1 cm/s                Mitral E to LV E' Lateral Ratio   9.7                     LV E' Septal Velocity             6.1 cm/s                Mitral E to LV E' Septal Ratio    12.8                      * Indicates values subject to auto-interpretation  LV EF:  55    %    FINDINGS  Left Ventricle  Normal left ventricular chamber size. Mild  concentric left ventricular   hypertrophy. Normal left ventricular systolic function. The left   ventricular ejection fraction is visually estimated to be 55%. No   regional wall motion abnormalities. Abnormal septal motion consistent   with postoperative status. Normal diastolic function.    Right Ventricle  Normal right ventricular size and systolic function.    Right Atrium  Normal right atrial size. Normal inferior vena cava size and   inspiratory collapse.    Left Atrium  Normal left atrial size. Left atrial volume index is 30 mL/sq m.    Mitral Valve  Structurally normal mitral valve. Trace mitral regurgitation. No mitral   stenosis.    Aortic Valve  Known bioprosthetic aortic valve that is functioning normally with   normal transvalvular gradients. Vmax is1.9 m/s. Transvalvular gradients   are - Peak: 14 mmHg, Mean: 8 mmHg. Acceleration time is71 ms. The LV   stroke volume index is 33 ml/m2 with a dimensionless index of 0.4    Tricuspid Valve  Structurally normal tricuspid valve. Trace tricuspid regurgitation.   Right atrial pressure is estimated to be 3 mmHg. Estimated right   ventricular systolic pressure is 20 mmHg.    Pulmonic Valve  Structurally normal pulmonic valve. No stenosis or regurgitation seen.    Pericardium  No pericardial effusion.    Aorta  Normal aortic root for body surface area. The ascending aorta diameter   is  3.2 cm.          Valente Coto MD  (Electronically Signed)  Final Date:     24 January 2024                   18:50

## 2024-12-03 ENCOUNTER — TELEPHONE (OUTPATIENT)
Dept: CARDIOLOGY | Facility: MEDICAL CENTER | Age: 54
End: 2024-12-03
Payer: COMMERCIAL

## 2024-12-03 NOTE — TELEPHONE ENCOUNTER
AK    Caller: Jassi Rooney    Topic/issue: MEDICAL ADVICE    Jassi is requesting an order be placed for an ECHO. Please advise.    Thank you,  Pool DUONG    Callback Number: [phone] (home)

## 2024-12-03 NOTE — TELEPHONE ENCOUNTER
"Phone Number Called: [phone]    Call outcome: Spoke to patient regarding message below.    Message: Called patient back about echo order. RN assessed symptoms. Patient reports small bouts of CP. Pt states some \"sharpness\" but does not happen very often. Pt has OV with AK 3/7/2025 and would like see if an echo is needed prior to OV.     To SC for AK, please advise if OK to order echo prior to OV 3/7/2025. ~thank you   "

## 2024-12-03 NOTE — TELEPHONE ENCOUNTER
Phone Number Called: [phone]    Call outcome: Spoke to patient regarding message below.    Message: Called to inform patient of SC's recommendation and that an echo is not warranted. Pt verbalized understanding.

## 2024-12-03 NOTE — TELEPHONE ENCOUNTER
ABHISHEK Lynn.  You3 minutes ago (3:27 PM)       No echo warranted. Sharp usually is not cardiac related. Continue to follow. If persists, let us know or if changes to dull ache, squeeze, or pressure. SC

## 2025-02-25 ENCOUNTER — OFFICE VISIT (OUTPATIENT)
Dept: CARDIOLOGY | Facility: MEDICAL CENTER | Age: 55
End: 2025-02-25
Attending: INTERNAL MEDICINE
Payer: COMMERCIAL

## 2025-02-25 ENCOUNTER — TELEPHONE (OUTPATIENT)
Dept: CARDIOLOGY | Facility: MEDICAL CENTER | Age: 55
End: 2025-02-25

## 2025-02-25 VITALS
HEART RATE: 77 BPM | WEIGHT: 234 LBS | BODY MASS INDEX: 32.76 KG/M2 | HEIGHT: 71 IN | OXYGEN SATURATION: 96 % | DIASTOLIC BLOOD PRESSURE: 94 MMHG | SYSTOLIC BLOOD PRESSURE: 142 MMHG | RESPIRATION RATE: 16 BRPM

## 2025-02-25 DIAGNOSIS — R73.01 IMPAIRED FASTING GLUCOSE: ICD-10-CM

## 2025-02-25 DIAGNOSIS — E78.5 HYPERLIPIDEMIA, UNSPECIFIED HYPERLIPIDEMIA TYPE: ICD-10-CM

## 2025-02-25 DIAGNOSIS — I10 ESSENTIAL HYPERTENSION, BENIGN: ICD-10-CM

## 2025-02-25 DIAGNOSIS — Z95.2 S/P AVR (AORTIC VALVE REPLACEMENT): ICD-10-CM

## 2025-02-25 PROCEDURE — 99213 OFFICE O/P EST LOW 20 MIN: CPT | Performed by: INTERNAL MEDICINE

## 2025-02-25 RX ORDER — SPIRONOLACTONE AND HYDROCHLOROTHIAZIDE 25; 25 MG/1; MG/1
1 TABLET ORAL DAILY
Qty: 100 TABLET | Refills: 3 | Status: SHIPPED | OUTPATIENT
Start: 2025-02-25

## 2025-02-25 ASSESSMENT — FIBROSIS 4 INDEX: FIB4 SCORE: 0.67

## 2025-02-25 NOTE — PROGRESS NOTES
"CARDIOLOGY OUTPATIENT FOLLOWUP    PCP: Steven Jones M.D.    1. Essential hypertension, benign    2. S/P AVR (aortic valve replacement)    3. Impaired fasting glucose    4. Hyperlipidemia, unspecified hyperlipidemia type        Jassi Rooney has uncontrolled hypertension with a strong diastolic component.  I recommend Aldactazide in addition to continuing lisinopril.  Will measure a basic metabolic panel as well as other labs in 1 week and check on his blood pressure at that time.  We will target a level of less than 130/80.  Should we need, resuming beta-blocker or calcium channel blocker could be done though he did have lower extremity edema several months back.    Follow up: 1 year    History: Jassi Rooney is a 54 y.o. male with history of 25 mm Garrett Inspiris valve placed August 2023, normal LVEF presenting for assessment of high blood pressure as well as follow-up of cardiovascular conditions.    He was recently found to have high blood pressure-most readings 160/110.  He is hoping to get his blood pressure under control so he may qualify for a new job.  He feels well.      He did run out of amlodipine and metoprolol-perhaps a month ago.  Does not know if the blood pressure was well-controlled on this regimen.  He was having problems with lower extremity edema.    Physical Exam:  BP (!) 142/94 (BP Location: Left arm, Patient Position: Sitting, BP Cuff Size: Adult)   Pulse 77   Resp 16   Ht 1.803 m (5' 11\")   Wt 106 kg (234 lb)   SpO2 96%   BMI 32.64 kg/m²   GEN: NAD  RESP: CTAB  CVS: RRR, No M/R/G  ABD: Soft, NT/ND  EXT: WWP, no edema    () Today's E/M visit is associated with medical care services that serve as the continuing focal point for all needed health care services and/or with medical care services that  are part of ongoing care related to a patient's single, serious condition, or a complex condition: This includes  furnishing services to patients on an ongoing basis that " "result in care that is personalized  to the patient. The services result in a comprehensive, longitudinal, and continuous  relationship with the patient and involve delivery of team-based care that is accessible, coordinated with other practitioners and providers, and integrated with the broader health  care landscape.     I personally interpreted the CT scan from the TAVR which showed no coronary artery calcification, no renal artery calcification, no adrenal nodules    Today's encounter addressed progression of chronic illness-hypertension which is severely uncontrolled.    The ASCVD Risk score (Whit DK, et al., 2019) failed to calculate.    Studies  No results found for: \"CHOLSTRLTOT\", \"LDL\", \"HDL\", \"TRIGLYCERIDE\"    Lab Results   Component Value Date/Time    SODIUM 137 08/13/2023 01:20 AM    POTASSIUM 3.8 08/13/2023 01:20 AM    CHLORIDE 101 08/13/2023 01:20 AM    CO2 26 08/13/2023 01:20 AM    GLUCOSE 126 (H) 08/13/2023 01:20 AM    BUN 16 08/13/2023 01:20 AM    CREATININE 0.80 08/13/2023 01:20 AM      Lab Results   Component Value Date/Time    PROTHROMBTM 16.4 (H) 08/09/2023 12:32 PM    INR 1.34 (H) 08/09/2023 12:32 PM      Lab Results   Component Value Date/Time    WBC 11.3 (H) 08/13/2023 01:20 AM    RBC 4.43 (L) 08/13/2023 01:20 AM    HEMOGLOBIN 12.9 (L) 08/13/2023 01:20 AM    HEMATOCRIT 38.4 (L) 08/13/2023 01:20 AM    MCV 86.7 08/13/2023 01:20 AM    MCH 29.1 08/13/2023 01:20 AM    MCHC 33.6 08/13/2023 01:20 AM    MPV 9.7 08/13/2023 01:20 AM    NEUTSPOLYS 56.30 08/08/2023 11:07 AM    LYMPHOCYTES 29.70 08/08/2023 11:07 AM    MONOCYTES 9.60 08/08/2023 11:07 AM    EOSINOPHILS 3.40 08/08/2023 11:07 AM    BASOPHILS 0.50 08/08/2023 11:07 AM        Past Medical History:   Diagnosis Date    Arrhythmia 07/24/2023    flutter    Breath shortness 07/24/2023    with exertion    Dental disorder 07/24/2023    upper & lower partials    Heart burn 07/24/2023    drinks water    Heart murmur 07/24/2023    as child    Heart " valve disease 07/24/2023    aortic-tricuspid    High cholesterol 07/24/2023    Hypertension     Indigestion      No Known Allergies  Outpatient Encounter Medications as of 2/25/2025   Medication Sig Dispense Refill    spironolactone/hctz (ALDACTAZIDE) 25-25 MG Tab Take 1 Tablet by mouth every day. 100 Tablet 3    ibuprofen (MOTRIN) 800 MG Tab Take 800 mg by mouth every 8 hours as needed. for pain      acetaminophen (TYLENOL) 500 MG Tab Take 1 Tablet by mouth every 6 hours as needed for Mild Pain.      aspirin 81 MG EC tablet Take 1 Tablet by mouth every day. 90 Tablet 2    Ascorbic Acid (VITAMIN C) 1000 MG Tab Take 1,000 mg by mouth every day.      Turmeric (QC TUMERIC COMPLEX) 500 MG Cap Take 500 mg by mouth every day.      Magnesium 250 MG Tab Take 250 mg by mouth every day.      Calcium Carb-Cholecalciferol (CALCIUM 600+D) 600-20 MG-MCG Tab Take 1 Capsule by mouth every day.      Flaxseed, Linseed, (FLAX SEED OIL) 1000 MG Cap Take 1,000 mg by mouth every day.      Multiple Vitamins-Minerals (CENTRUM SILVER 50+MEN PO) Take 1 Tablet by mouth every day.      lisinopril (PRINIVIL) 20 MG Tab Take 20 mg by mouth every day.      atorvastatin (LIPITOR) 20 MG Tab Take 20 mg by mouth every day.      [DISCONTINUED] amLODIPine (NORVASC) 5 MG Tab Take 1 Tablet by mouth every day. (Patient not taking: Reported on 2/25/2025) 90 Tablet 3    [DISCONTINUED] metoprolol tartrate (LOPRESSOR) 25 MG Tab Take 1 Tablet by mouth 2 times a day. (Patient not taking: Reported on 2/25/2025) 180 Tablet 3     No facility-administered encounter medications on file as of 2/25/2025.     Social History     Socioeconomic History    Marital status:      Spouse name: Not on file    Number of children: Not on file    Years of education: Not on file    Highest education level: Not on file   Occupational History    Not on file   Tobacco Use    Smoking status: Former     Types: Cigars     Quit date: 1/1/2022     Years since quitting: 3.1      Passive exposure: Never    Smokeless tobacco: Never    Tobacco comments:     cigar every few months   Vaping Use    Vaping status: Never Used   Substance and Sexual Activity    Alcohol use: Not Currently     Alcohol/week: 1.2 oz     Types: 2 Shots of liquor per week     Comment: 1 shot per month    Drug use: Never    Sexual activity: Yes     Partners: Female     Birth control/protection: None   Other Topics Concern    Not on file   Social History Narrative    Not on file     Social Drivers of Health     Financial Resource Strain: Not on file   Food Insecurity: Not on file   Transportation Needs: Not on file   Physical Activity: Not on file   Stress: Not on file   Social Connections: Not on file   Intimate Partner Violence: Not on file   Housing Stability: Not on file         ROS:   10 point review systems is otherwise negative except as per the HPI    Chief Complaint   Patient presents with    Follow-Up     H/O aortic valve replacement    Hypertension    Hyperlipidemia

## 2025-02-25 NOTE — TELEPHONE ENCOUNTER
----- Message from Physician Nabil Cuellar M.D. sent at 2/25/2025  1:42 PM PST -----  Can you check on BP in one week? Thx  BE   3

## (undated) DEVICE — GLOVE BIOGEL PI INDICATOR SZ 7.5 SURGICAL PF LF -(50/BX 4BX/CA)

## (undated) DEVICE — SUCTION INSTRUMENT YANKAUER BULBOUS TIP W/O VENT (50EA/CA)

## (undated) DEVICE — GLOVE BIOGEL SZ 6.5 SURGICAL PF LTX (50PR/BX 4BX/CA)

## (undated) DEVICE — SUTURE 2-0 ETHIBOND V-5 30 (12EA/BX)"

## (undated) DEVICE — SOD. CHL. INJ. 0.9% 1000 ML - (14EA/CA 60CA/PF)

## (undated) DEVICE — BLADE SURGICAL #15 - (50/BX 3BX/CA)

## (undated) DEVICE — FIBRILLAR SURGICEL 4X4 - 10/CA

## (undated) DEVICE — GLOVE SZ 7 BIOGEL PI MICRO - PF LF (50PR/BX 4BX/CA)

## (undated) DEVICE — SUTURE 2-0 VICRYL PLUS CT-1 36 (36PK/BX)"

## (undated) DEVICE — GLOVE BIOGEL INDICATOR SZ 9 SURGICAL PF LTX - (160/CA)

## (undated) DEVICE — TUBE CHEST 32FR. STRAIGHT - (10EA/CA)

## (undated) DEVICE — TRAY MULTI-LUMEN 7FR PRESSURE W/MAX BARRIER AND BIOPATCH - (5/CA)

## (undated) DEVICE — SLEEVE, VASO, THIGH, MED

## (undated) DEVICE — LACTATED RINGERS INJ 1000 ML - (14EA/CA 60CA/PF)

## (undated) DEVICE — ELECTRODES PAIRED NEEDLE - (1/BX)

## (undated) DEVICE — PACK CV DRAPING/BASIN 2PART - (1/CA)

## (undated) DEVICE — TOWEL STOP TIMEOUT SAFETY FLAG (40EA/CA)

## (undated) DEVICE — TRAY SURESTEP FOLEY TEMP SENSING 16FR (10EA/CA) ORDER  #18764 FOR TEMP FOLEY ONLY

## (undated) DEVICE — CANISTER SUCTION 3000ML MECHANICAL FILTER AUTO SHUTOFF MEDI-VAC NONSTERILE LF DISP  (40EA/CA)

## (undated) DEVICE — SET FLUID WARMING STANDARD FLOW - (10/CA)

## (undated) DEVICE — STOPCOCK MALE 4-WAY - (50/CA)

## (undated) DEVICE — SUTURE 5 SURGICAL STEEL V-40 - (12/BX) CCS CURRENT

## (undated) DEVICE — SET LEADWIRE 5 LEAD BEDSIDE DISPOSABLE ECG (1SET OF 5/EA)

## (undated) DEVICE — QUICKLOADS COR-KNOT TITANIUM - (12/BX)

## (undated) DEVICE — COVER LIGHT HANDLE ALC PLUS DISP (18EA/BX)

## (undated) DEVICE — ELECTRODE DUAL RETURN W/ CORD - (50/PK)

## (undated) DEVICE — SET BIFURCATED BLOOD - (48EA/CS)

## (undated) DEVICE — SYSTEM CHEST DRAIN ADULT/PEDS W/AUTO TRANSFUSION CAPABILITY SAHARA (6EA/CA)

## (undated) DEVICE — D-5-W INJ. 500 ML - (24EA/CA)

## (undated) DEVICE — SODIUM CHL. INJ. 0.9% 500ML (24EA/CA 50CA/PF)

## (undated) DEVICE — MICRODRIP PRIMARY VENTED 60 (48EA/CA) THIS WAS PART #2C8428 WHICH WAS DISCONTINUED

## (undated) DEVICE — BAG RESUSCITATION DISPOSABLE - WITH MASK (10 EA/CA)

## (undated) DEVICE — PTFE PLED STER - (250/CA)

## (undated) DEVICE — TRANSDUCER BIFURCATED MONITORING KIT (10EA/CA)

## (undated) DEVICE — SET EXTENSION WITH 2 PORTS (48EA/CA) ***PART #2C8610 IS A SUBSTITUTE*****

## (undated) DEVICE — SENSOR OXIMETER ADULT SPO2 RD SET (20EA/BX)

## (undated) DEVICE — SUTURE 3-0 PROLENE SH 36 (36PK/BX)"

## (undated) DEVICE — LEAD PACING TEMP MYO - (12/BX)

## (undated) DEVICE — TUBING CLEARLINK DUO-VENT - C-FLO (48EA/CA)

## (undated) DEVICE — PAD LAP STERILE 18 X 18 - (5/PK 40PK/CA)

## (undated) DEVICE — INSERT STEALTH #5 - (10/BX)

## (undated) DEVICE — BLADE STERNUM SAW SURGICAL 32.0 X 6.4 MM STERILE (1/EA)

## (undated) DEVICE — SYS DLV COST CLS RM TEMP - INJECTATE (CO-SET II) (10EA/CA)

## (undated) DEVICE — DEVICE KIT COR-KNOT COMBO2 DEVICES & 12 KNOTS PER KIT (6KT/CA)

## (undated) DEVICE — SUTURE OHS

## (undated) DEVICE — KIT RADIAL ARTERY 20GA W/MAX BARRIER AND BIOPATCH  (5EA/CA) #10740 IS FOR THE SET RADIAL ARTERIAL

## (undated) DEVICE — TUBE CHEST 32FR. RIGHT ANGLED (10EA/CA)

## (undated) DEVICE — SUTURE 4-0 PROLENE SH 36 (36PK/BX)"

## (undated) DEVICE — GLOVE SIZE 7.0 SURGEON ACCELERATOR FREE GREEN (50PR/BX 4BX/CA)

## (undated) DEVICE — SUTURE 0 ETHIBOND MO6 C/R - (12/BX) 8-18 INCH ETHICON

## (undated) DEVICE — CHLORAPREP 26 ML APPLICATOR - ORANGE TINT(25/CA)

## (undated) DEVICE — KIT INTROPERCUTANEOUS SHEATH - 8.5 FR W/MAX BARRIER AND BIOPATCH  (5/CA)

## (undated) DEVICE — GLOVE SZ 7.5 BIOGEL PI MICRO - PF LF (50PR/BX)

## (undated) DEVICE — CATHETER THERMALDILUTION SWAN - (5EA/CA)

## (undated) DEVICE — INSERT STEALTH #3 - (10/BX)

## (undated) DEVICE — GLOVE BIOGEL SZ 8.5 SURGICAL PF LTX - (50PR/BX 4BX/CA)

## (undated) DEVICE — SODIUM CHL IRRIGATION 0.9% 1000ML (12EA/CA)

## (undated) DEVICE — SENSOR CEREBRAL AND SOMATIC MONITORING (20/CA)

## (undated) DEVICE — ORGANIZER SUTURE GABBAY-FRAT - ER STERILE (3/SET 4ST/BX)

## (undated) DEVICE — SUTURE 4-0 PROLENE V-7 D/A (36PK/BX)

## (undated) DEVICE — SUTURE 4-0 PROLENE RB-1 D/A 36 (36PK/BX)"